# Patient Record
Sex: FEMALE | Race: WHITE | NOT HISPANIC OR LATINO | Employment: PART TIME | URBAN - METROPOLITAN AREA
[De-identification: names, ages, dates, MRNs, and addresses within clinical notes are randomized per-mention and may not be internally consistent; named-entity substitution may affect disease eponyms.]

---

## 2017-02-09 ENCOUNTER — OFFICE VISIT (OUTPATIENT)
Dept: FAMILY MEDICINE | Facility: CLINIC | Age: 52
End: 2017-02-09
Payer: COMMERCIAL

## 2017-02-09 ENCOUNTER — HOSPITAL ENCOUNTER (OUTPATIENT)
Dept: RADIOLOGY | Facility: HOSPITAL | Age: 52
Discharge: HOME | End: 2017-02-09
Attending: NURSE PRACTITIONER
Payer: COMMERCIAL

## 2017-02-09 VITALS
BODY MASS INDEX: 22.46 KG/M2 | RESPIRATION RATE: 18 BRPM | SYSTOLIC BLOOD PRESSURE: 104 MMHG | TEMPERATURE: 98 F | OXYGEN SATURATION: 98 % | HEIGHT: 66 IN | WEIGHT: 139.75 LBS | HEART RATE: 55 BPM | DIASTOLIC BLOOD PRESSURE: 72 MMHG

## 2017-02-09 DIAGNOSIS — S99.912A INJURY, ANKLE, LEFT, INITIAL ENCOUNTER: Primary | ICD-10-CM

## 2017-02-09 DIAGNOSIS — R20.2 TINGLING OF SKIN: ICD-10-CM

## 2017-02-09 DIAGNOSIS — S99.912A INJURY, ANKLE, LEFT, INITIAL ENCOUNTER: ICD-10-CM
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
## 2017-02-09 DIAGNOSIS — Z23 NEED FOR TDAP VACCINATION: ICD-10-CM

## 2017-02-09 PROCEDURE — 90715 TDAP VACCINE 7 YRS/> IM: CPT | Mod: S$GLB,,, | Performed by: NURSE PRACTITIONER

## 2017-02-09 PROCEDURE — 90471 IMMUNIZATION ADMIN: CPT | Mod: S$GLB,,, | Performed by: NURSE PRACTITIONER

## 2017-02-09 PROCEDURE — 73610 X-RAY EXAM OF ANKLE: CPT | Mod: TC,PO,LT

## 2017-02-09 PROCEDURE — 99213 OFFICE O/P EST LOW 20 MIN: CPT | Mod: 25,S$GLB,, | Performed by: NURSE PRACTITIONER

## 2017-02-09 PROCEDURE — 3078F DIAST BP <80 MM HG: CPT | Mod: S$GLB,,, | Performed by: NURSE PRACTITIONER

## 2017-02-09 PROCEDURE — 99999 PR PBB SHADOW E&M-EST. PATIENT-LVL IV: CPT | Mod: PBBFAC,,, | Performed by: NURSE PRACTITIONER

## 2017-02-09 PROCEDURE — 73610 X-RAY EXAM OF ANKLE: CPT | Mod: 26,LT,, | Performed by: RADIOLOGY

## 2017-02-09 PROCEDURE — 3074F SYST BP LT 130 MM HG: CPT | Mod: S$GLB,,, | Performed by: NURSE PRACTITIONER

## 2017-02-09 NOTE — MR AVS SNAPSHOT
[facility]  [address]  [place] [address]  Phone: [phone]  Fax: [phone]                  Viridiana Ross   2017 11:20 AM   Office Visit    Description:  Female : 1965   Provider:  Marion Batista NP   Department:  [facility]           Reason for Visit     Laceration           Diagnoses this Visit        Comments    Injury, ankle, left, initial encounter    -  Primary     Contact with sharp glass, initial encounter         Tingling of skin         Need for Tdap vaccination                To Do List           Future Appointments        Provider Department Dept Phone    2017 4:00 PM NAT Patino MD [facility] [phone]      Goals (5 Years of Data)     None      [facility]     [facility] On Call Nurse Care Line -  Assistance  Registered nurses in the [facility] provide clinical advisement, health education, appointment booking, and other advisory services.  Call for this free service at [phone].             Medications           Message regarding Medications     Verify the changes and/or additions to your medication regime listed below are the same as discussed with your clinician today.  If any of these changes or additions are incorrect, please notify your healthcare provider.             Verify that the below list of medications is an accurate representation of the medications you are currently taking.  If none reported, the list may be blank. If incorrect, please contact your healthcare provider. Carry this list with you in case of emergency.           Current Medications     ACANYA 1.2-2.5 % GlwP 1 g by Other route once daily. Apply 1 g to face daily    calcium carbonate (OS-BENITA) 600 mg (1,500 mg) Tab Take 600 mg by mouth once daily.    estradiol 0.05 mg/24 hr td ptsw (VIVELLE-DOT) 0.05 mg/24 hr Place 1 patch onto the skin twice a week.    hydrochlorothiazide (HYDRODIURIL) 25 MG tablet  "Take 25 mg by mouth once daily.    Saccharomyces boulardii (FLORASTOR) 250 mg capsule Take 250 mg by mouth once daily.    testosterone (ANDROGEL) 1 % (50 mg/5 gram) GlPk Apply 1 drop topically once daily.           Clinical Reference Information           Your Vitals Were     BP Pulse Temp Resp Height Weight    104/72 (BP Location: Right arm, Patient Position: Sitting, BP Method: Manual) 55 97.9 °F (36.6 °C) (Oral) 18 5' 6" (1.676 m) 63.4 kg (139 lb 12.4 oz)    SpO2 BMI             98% 22.56 kg/m2         Blood Pressure          Most Recent Value    BP  104/72      Allergies as of 2/9/2017     No Known Allergies      Immunizations Administered on Date of Encounter - 2/9/2017     Name Date Dose VIS Date Route    TDAP  Incomplete 0.5 mL 2/24/2015 Intramuscular      Orders Placed During Today's Visit      Normal Orders This Visit    Tdap Vaccine     Future Labs/Procedures Expected by Expires    X-Ray Ankle Complete Left  2/9/2017 2/10/2018      Language Assistance Services     ATTENTION: Language assistance services are available, free of charge. Please call [phone].      ATENCIÓN: Si habla español, tiene a marin disposición servicios gratuitos de asistencia lingüística. Llame al [phone].     NORAH Ý: N?u b?n nói Ti?ng Vi?t, có các d?ch v? h? tr? ngôn ng? mi?n phí dành cho b?n. G?i s? [phone].         [facility] complies with applicable Federal civil rights laws and does not discriminate on the basis of race, color, national origin, age, disability, or sex.        "

## 2017-02-09 NOTE — PROGRESS NOTES
Subjective:       Patient ID: Viridiana Ross is a 51 y.o. female.    Chief Complaint: Laceration (pt c/o cut on left ankle painful , states broke wine glass on friday and around the cut in numb and has shooting pain )  She was last seen in primary care by Dr. Patino on 12/05/2016. This is her first time seeing me in the clinic.  HPI   Cut on left lower leg from a bottle 6 days ago. Site is very painful to touch, even a sheet on it.  States glass broke onto leg when falling  Vitals:    02/09/17 1147   BP: 104/72   Pulse: (!) 55   Resp: 18   Temp: 97.9 °F (36.6 °C)     Review of Systems    Objective:      Physical Exam   Constitutional: She is oriented to person, place, and time. Vital signs are normal. She appears well-developed and well-nourished.   HENT:   Head: Normocephalic and atraumatic.   Cardiovascular: Normal rate, regular rhythm and normal heart sounds.    Pulmonary/Chest: Effort normal and breath sounds normal.   Musculoskeletal:        Feet:    Neurological: She is alert and oriented to person, place, and time.   Skin: Skin is warm, dry and intact.   Psychiatric: She has a normal mood and affect. Her speech is normal and behavior is normal. Judgment and thought content normal. Cognition and memory are normal.   Nursing note and vitals reviewed.          Assessment & Plan:       Injury, ankle, left, initial encounter  -     X-Ray Ankle Complete Left; Future; Expected date: 2/9/17  -     Tdap Vaccine    Contact with sharp glass, initial encounter  -     X-Ray Ankle Complete Left; Future; Expected date: 2/9/17  -     Tdap Vaccine    Tingling of skin  -     X-Ray Ankle Complete Left; Future; Expected date: 2/9/17    Need for Tdap vaccination  -     Tdap Vaccine          No Follow-up on file.

## 2017-05-22 ENCOUNTER — PATIENT OUTREACH (OUTPATIENT)
Dept: ADMINISTRATIVE | Facility: HOSPITAL | Age: 52
End: 2017-05-22

## 2017-06-06 ENCOUNTER — OFFICE VISIT (OUTPATIENT)
Dept: FAMILY MEDICINE | Facility: CLINIC | Age: 52
End: 2017-06-06
Payer: COMMERCIAL

## 2017-06-06 VITALS
HEART RATE: 56 BPM | BODY MASS INDEX: 22.29 KG/M2 | WEIGHT: 138.69 LBS | SYSTOLIC BLOOD PRESSURE: 108 MMHG | HEIGHT: 66 IN | DIASTOLIC BLOOD PRESSURE: 74 MMHG

## 2017-06-06 DIAGNOSIS — E78.5 HYPERLIPIDEMIA, UNSPECIFIED HYPERLIPIDEMIA TYPE: ICD-10-CM

## 2017-06-06 DIAGNOSIS — I10 ESSENTIAL HYPERTENSION: Primary | ICD-10-CM

## 2017-06-06 DIAGNOSIS — Z11.59 NEED FOR HEPATITIS C SCREENING TEST: ICD-10-CM

## 2017-06-06 PROCEDURE — 99214 OFFICE O/P EST MOD 30 MIN: CPT | Mod: S$GLB,,, | Performed by: FAMILY MEDICINE

## 2017-06-06 PROCEDURE — 99999 PR PBB SHADOW E&M-EST. PATIENT-LVL III: CPT | Mod: PBBFAC,,, | Performed by: FAMILY MEDICINE

## 2017-06-06 NOTE — PROGRESS NOTES
Subjective:       Patient ID: Viridiana Ross is a 52 y.o. female.    Chief Complaint: Follow-up Borderline High Cholesterol and Mammogram Done    Here for f/u htn and lipids.       Hypertension   This is a chronic problem. The current episode started more than 1 year ago. The problem is controlled. Pertinent negatives include no chest pain, palpitations or shortness of breath. Past treatments include diuretics.   Hyperlipidemia   This is a chronic problem. The current episode started more than 1 year ago. The problem is uncontrolled. Pertinent negatives include no chest pain or shortness of breath.     Review of Systems   Constitutional: Negative for chills, fatigue and fever.   Respiratory: Negative for cough, chest tightness and shortness of breath.    Cardiovascular: Negative for chest pain, palpitations and leg swelling.   Gastrointestinal: Negative for abdominal distention and abdominal pain.   Endocrine: Negative for cold intolerance and heat intolerance.   Skin: Negative for rash.   Psychiatric/Behavioral: The patient is not nervous/anxious.        Objective:      Physical Exam   Constitutional: She appears well-developed and well-nourished.   HENT:   Head: Normocephalic and atraumatic.   Cardiovascular: Normal rate, regular rhythm and normal heart sounds.    Pulmonary/Chest: Effort normal and breath sounds normal.   Psychiatric: She has a normal mood and affect.   Nursing note and vitals reviewed.      Assessment:       1. Essential hypertension    2. Hyperlipidemia, unspecified hyperlipidemia type    3. Need for hepatitis C screening test        Plan:       Essential hypertension  -     Comprehensive metabolic panel; Future; Expected date: 06/06/2017  -     Lipid panel; Future; Expected date: 06/06/2017    Hyperlipidemia, unspecified hyperlipidemia type  -     Comprehensive metabolic panel; Future; Expected date: 06/06/2017  -     Lipid panel; Future; Expected date: 06/06/2017    Need for hepatitis C  screening test  -     Hepatitis C antibody; Future; Expected date: 06/06/2017      Will monitor chronic medical issues and continue current plan of care.  Start statin if cholesterol up again after discussing with patient.  Bring cscope report or send via portal.  Return in about 3 months (around 9/6/2017), or if symptoms worsen or fail to improve.

## 2017-06-09 ENCOUNTER — LAB VISIT (OUTPATIENT)
Dept: LAB | Facility: HOSPITAL | Age: 52
End: 2017-06-09
Attending: FAMILY MEDICINE
Payer: COMMERCIAL

## 2017-06-09 DIAGNOSIS — E78.5 HYPERLIPIDEMIA, UNSPECIFIED HYPERLIPIDEMIA TYPE: ICD-10-CM

## 2017-06-09 DIAGNOSIS — I10 ESSENTIAL HYPERTENSION: ICD-10-CM

## 2017-06-09 DIAGNOSIS — Z11.59 NEED FOR HEPATITIS C SCREENING TEST: ICD-10-CM

## 2017-06-09 LAB
ALBUMIN SERPL BCP-MCNC: 3.8 G/DL
ALP SERPL-CCNC: 67 U/L
ALT SERPL W/O P-5'-P-CCNC: 19 U/L
ANION GAP SERPL CALC-SCNC: 7 MMOL/L
AST SERPL-CCNC: 20 U/L
BILIRUB SERPL-MCNC: 1.3 MG/DL
BUN SERPL-MCNC: 18 MG/DL
CALCIUM SERPL-MCNC: 9.6 MG/DL
CHLORIDE SERPL-SCNC: 106 MMOL/L
CHOLEST/HDLC SERPL: 3.1 {RATIO}
CO2 SERPL-SCNC: 27 MMOL/L
CREAT SERPL-MCNC: 1 MG/DL
EST. GFR  (AFRICAN AMERICAN): >60 ML/MIN/1.73 M^2
EST. GFR  (NON AFRICAN AMERICAN): >60 ML/MIN/1.73 M^2
GLUCOSE SERPL-MCNC: 80 MG/DL
HCV AB SERPL QL IA: NEGATIVE
HDL/CHOLESTEROL RATIO: 32.1 %
HDLC SERPL-MCNC: 234 MG/DL
HDLC SERPL-MCNC: 75 MG/DL
LDLC SERPL CALC-MCNC: 144.4 MG/DL
NONHDLC SERPL-MCNC: 159 MG/DL
POTASSIUM SERPL-SCNC: 4.1 MMOL/L
PROT SERPL-MCNC: 6.7 G/DL
SODIUM SERPL-SCNC: 140 MMOL/L
TRIGL SERPL-MCNC: 73 MG/DL

## 2017-06-09 PROCEDURE — 80061 LIPID PANEL: CPT

## 2017-06-09 PROCEDURE — 80053 COMPREHEN METABOLIC PANEL: CPT

## 2017-06-09 PROCEDURE — 36415 COLL VENOUS BLD VENIPUNCTURE: CPT | Mod: PO

## 2017-06-09 PROCEDURE — 86803 HEPATITIS C AB TEST: CPT

## 2017-06-28 DIAGNOSIS — E78.5 HYPERLIPIDEMIA, UNSPECIFIED HYPERLIPIDEMIA TYPE: Primary | ICD-10-CM

## 2017-06-28 RX ORDER — ATORVASTATIN CALCIUM 20 MG/1
20 TABLET, FILM COATED ORAL DAILY
Qty: 90 TABLET | Refills: 3 | Status: SHIPPED | OUTPATIENT
Start: 2017-06-28 | End: 2017-09-14

## 2017-06-30 ENCOUNTER — TELEPHONE (OUTPATIENT)
Dept: FAMILY MEDICINE | Facility: CLINIC | Age: 52
End: 2017-06-30

## 2017-06-30 NOTE — TELEPHONE ENCOUNTER
----- Message from Sue Jaime sent at 6/30/2017 10:19 AM CDT -----  Patient is returning nurse's call concerning lab results/please call patient back at [phone].

## 2017-07-06 NOTE — TELEPHONE ENCOUNTER
NAT Patino MD sent to [facility]             Sent lab result which is correct but also sending med in for high cholesterol      Left message asking patient call back regarding results and medication prescribed by PCP.

## 2017-07-06 NOTE — TELEPHONE ENCOUNTER
----- Message from NAT Patino MD sent at 6/28/2017  9:49 PM CDT -----  Sent lab result which is correct but also sending med in for high cholesterol  ----- Message -----  From: NAT Patino MD  Sent: 6/6/2017   4:56 PM  To: NAT Patino MD    Statin if cholesterol up

## 2017-07-12 ENCOUNTER — TELEPHONE (OUTPATIENT)
Dept: FAMILY MEDICINE | Facility: CLINIC | Age: 52
End: 2017-07-12

## 2017-07-12 NOTE — TELEPHONE ENCOUNTER
----- Message from Marybeth Falcon sent at 7/12/2017  3:52 PM CDT -----  Contact: Patient  Viridiana, patient [phone], Patient is returning the nurse's call. Call to POD. Please advise. thanks.

## 2017-09-08 ENCOUNTER — TELEPHONE (OUTPATIENT)
Dept: FAMILY MEDICINE | Facility: CLINIC | Age: 52
End: 2017-09-08

## 2017-09-08 NOTE — TELEPHONE ENCOUNTER
Spoke with patient and she stated that it was your intention to have labs rechecked to compare values. If so, which labs would you like to order. Also, patient would like labs scheduled at clinic in [place], [place].

## 2017-09-13 ENCOUNTER — TELEPHONE (OUTPATIENT)
Dept: FAMILY MEDICINE | Facility: CLINIC | Age: 52
End: 2017-09-13

## 2017-09-13 NOTE — TELEPHONE ENCOUNTER
----- Message from Becca Arellano sent at 9/12/2017  4:37 PM CDT -----  Contact: [phone]  Patient is requesting a call back from the nurse want to know if she should get labs done prior to her appt Thursday.  If labs are to be done she prefer to have them done in [place], and need to know today so she can start fasting.    Please call the patient upon request at phone number [phone].

## 2017-09-14 ENCOUNTER — OFFICE VISIT (OUTPATIENT)
Dept: FAMILY MEDICINE | Facility: CLINIC | Age: 52
End: 2017-09-14
Payer: COMMERCIAL

## 2017-09-14 VITALS
DIASTOLIC BLOOD PRESSURE: 84 MMHG | HEART RATE: 64 BPM | HEIGHT: 66 IN | WEIGHT: 139.31 LBS | SYSTOLIC BLOOD PRESSURE: 122 MMHG | RESPIRATION RATE: 16 BRPM | BODY MASS INDEX: 22.39 KG/M2

## 2017-09-14 DIAGNOSIS — I10 ESSENTIAL HYPERTENSION: Primary | ICD-10-CM

## 2017-09-14 DIAGNOSIS — M72.2 PLANTAR FASCIITIS: ICD-10-CM

## 2017-09-14 DIAGNOSIS — E78.5 HYPERLIPIDEMIA, UNSPECIFIED HYPERLIPIDEMIA TYPE: ICD-10-CM

## 2017-09-14 PROCEDURE — 99213 OFFICE O/P EST LOW 20 MIN: CPT | Mod: S$GLB,,, | Performed by: FAMILY MEDICINE

## 2017-09-14 PROCEDURE — 3074F SYST BP LT 130 MM HG: CPT | Mod: S$GLB,,, | Performed by: FAMILY MEDICINE

## 2017-09-14 PROCEDURE — 3008F BODY MASS INDEX DOCD: CPT | Mod: S$GLB,,, | Performed by: FAMILY MEDICINE

## 2017-09-14 PROCEDURE — 99999 PR PBB SHADOW E&M-EST. PATIENT-LVL III: CPT | Mod: PBBFAC,,, | Performed by: FAMILY MEDICINE

## 2017-09-14 PROCEDURE — 3079F DIAST BP 80-89 MM HG: CPT | Mod: S$GLB,,, | Performed by: FAMILY MEDICINE

## 2017-09-14 NOTE — PROGRESS NOTES
Subjective:       Patient ID: Viridiana Ross is a 52 y.o. female.    Chief Complaint: Hypertension (Patient here for 3 month follow up); Ankle Pain (Patient reports pain to right lower extrimity near ankle. ); and Wrist Pain (Patient reports pain to right wrist.)    Hypertension   This is a chronic problem. The current episode started more than 1 year ago. The problem is controlled. Pertinent negatives include no chest pain, headaches, neck pain or palpitations. Past treatments include diuretics.   Ankle Pain      Wrist Pain      Hyperlipidemia   This is a chronic problem. The current episode started more than 1 year ago. The problem is uncontrolled. Recent lipid tests were reviewed and are high. Pertinent negatives include no chest pain. Current antihyperlipidemic treatment includes diet change. The current treatment provides mild improvement of lipids.     Review of Systems   Constitutional: Negative for activity change and unexpected weight change.   HENT: Negative for hearing loss, rhinorrhea and trouble swallowing.    Eyes: Negative for discharge.   Respiratory: Negative for chest tightness and wheezing.    Cardiovascular: Negative for chest pain and palpitations.   Gastrointestinal: Negative for blood in stool, constipation, diarrhea and vomiting.   Endocrine: Negative for polydipsia and polyuria.   Genitourinary: Negative for difficulty urinating, dysuria, hematuria and menstrual problem.   Musculoskeletal: Positive for arthralgias. Negative for joint swelling and neck pain.   Neurological: Negative for weakness and headaches.   Psychiatric/Behavioral: Negative for confusion and dysphoric mood.       Objective:      Physical Exam   Constitutional: She appears well-developed and well-nourished.   HENT:   Head: Normocephalic and atraumatic.   Cardiovascular: Normal rate, regular rhythm and normal heart sounds.    Pulmonary/Chest: Effort normal and breath sounds normal.   Skin: She is not diaphoretic.    Psychiatric: She has a normal mood and affect.   Nursing note and vitals reviewed.      Assessment:       1. Essential hypertension    2. Hyperlipidemia, unspecified hyperlipidemia type    3. Plantar fasciitis        Plan:       Essential hypertension  -     Comprehensive metabolic panel; Future; Expected date: 09/14/2017  -     Lipid panel; Future; Expected date: 09/14/2017    Hyperlipidemia, unspecified hyperlipidemia type  -     Comprehensive metabolic panel; Future; Expected date: 09/14/2017  -     Lipid panel; Future; Expected date: 09/14/2017    Plantar fasciitis      Supportive shoes for feet and call if not improved.  Recheck lipids and if low 200s we discussed holding off statin.    Return in about 6 months (around 3/14/2018), or if symptoms worsen or fail to improve.

## 2017-09-15 ENCOUNTER — LAB VISIT (OUTPATIENT)
Dept: LAB | Facility: HOSPITAL | Age: 52
End: 2017-09-15
Attending: FAMILY MEDICINE
Payer: COMMERCIAL

## 2017-09-15 DIAGNOSIS — E78.5 HYPERLIPIDEMIA, UNSPECIFIED HYPERLIPIDEMIA TYPE: ICD-10-CM

## 2017-09-15 DIAGNOSIS — I10 ESSENTIAL HYPERTENSION: ICD-10-CM

## 2017-09-15 LAB
ALBUMIN SERPL BCP-MCNC: 3.3 G/DL
ALP SERPL-CCNC: 70 U/L
ALT SERPL W/O P-5'-P-CCNC: 17 U/L
ANION GAP SERPL CALC-SCNC: 7 MMOL/L
AST SERPL-CCNC: 20 U/L
BILIRUB SERPL-MCNC: 0.8 MG/DL
BUN SERPL-MCNC: 20 MG/DL
CALCIUM SERPL-MCNC: 9 MG/DL
CHLORIDE SERPL-SCNC: 107 MMOL/L
CHOLEST SERPL-MCNC: 212 MG/DL
CHOLEST/HDLC SERPL: 3.1 {RATIO}
CO2 SERPL-SCNC: 27 MMOL/L
CREAT SERPL-MCNC: 0.8 MG/DL
EST. GFR  (AFRICAN AMERICAN): >60 ML/MIN/1.73 M^2
EST. GFR  (NON AFRICAN AMERICAN): >60 ML/MIN/1.73 M^2
GLUCOSE SERPL-MCNC: 83 MG/DL
HDLC SERPL-MCNC: 68 MG/DL
HDLC SERPL: 32.1 %
LDLC SERPL CALC-MCNC: 135 MG/DL
NONHDLC SERPL-MCNC: 144 MG/DL
POTASSIUM SERPL-SCNC: 3.9 MMOL/L
PROT SERPL-MCNC: 6.4 G/DL
SODIUM SERPL-SCNC: 141 MMOL/L
TRIGL SERPL-MCNC: 45 MG/DL

## 2017-09-15 PROCEDURE — 80061 LIPID PANEL: CPT

## 2017-09-15 PROCEDURE — 80053 COMPREHEN METABOLIC PANEL: CPT

## 2017-09-15 PROCEDURE — 36415 COLL VENOUS BLD VENIPUNCTURE: CPT | Mod: PO

## 2018-04-06 NOTE — TELEPHONE ENCOUNTER
----- Message from Horace Munguia sent at 9/8/2017  8:38 AM CDT -----  Contact: patient  Patient called requesting order for labs before visit.please place order today. Thanks,   4

## 2018-05-15 ENCOUNTER — NURSE TRIAGE (OUTPATIENT)
Dept: ADMINISTRATIVE | Facility: CLINIC | Age: 53
End: 2018-05-15

## 2018-05-16 NOTE — TELEPHONE ENCOUNTER
"  Reason for Disposition   [1] MILD-MODERATE pain AND [2] constant AND [3] present > 2 hours    Answer Assessment - Initial Assessment Questions  1. LOCATION: "Where does it hurt?"       Abdominal pain  2. RADIATION: "Does the pain shoot anywhere else?" (e.g., chest, back)      Not reported  3. ONSET: "When did the pain begin?" (e.g., minutes, hours or days ago)       Last pm  4. SUDDEN: "Gradual or sudden onset?"      sudden  5. PATTERN "Does the pain come and go, or is it constant?"     - If constant: "Is it getting better, staying the same, or worsening?"       (Note: Constant means the pain never goes away completely; most serious pain is constant and it progresses)      - If intermittent: "How long does it last?" "Do you have pain now?"      (Note: Intermittent means the pain goes away completely between bouts)      Intermittent cramping  6. SEVERITY: "How bad is the pain?"  (e.g., Scale 1-10; mild, moderate, or severe)    - MILD (1-3): doesn't interfere with normal activities, abdomen soft and not tender to touch     - MODERATE (4-7): interferes with normal activities or awakens from sleep, tender to touch     - SEVERE (8-10): excruciating pain, doubled over, unable to do any normal activities       7/10 at it's worse- not feeling it now as it comes/goes  7. RECURRENT SYMPTOM: "Have you ever had this type of abdominal pain before?" If so, ask: "When was the last time?" and "What happened that time?"       Yes-hx of diverticulitis   8. CAUSE: "What do you think is causing the abdominal pain?"      As above  9. RELIEVING/AGGRAVATING FACTORS: "What makes it better or worse?" (e.g., movement, antacids, bowel movement)      n/a  10. OTHER SYMPTOMS: "Has there been any vomiting, diarrhea, constipation, or urine problems?"        Chills, temp 101 ,  Thin stools  11. PREGNANCY: "Is there any chance you are pregnant?" "When was your last menstrual period?"        n/a    Protocols used: ST ABDOMINAL PAIN - " FEMALE-A-AH

## 2018-06-23 ENCOUNTER — PATIENT MESSAGE (OUTPATIENT)
Dept: FAMILY MEDICINE | Facility: CLINIC | Age: 53
End: 2018-06-23

## 2018-11-01 ENCOUNTER — OFFICE VISIT (OUTPATIENT)
Dept: FAMILY MEDICINE | Facility: CLINIC | Age: 53
End: 2018-11-01
Payer: COMMERCIAL

## 2018-11-01 VITALS
HEART RATE: 60 BPM | OXYGEN SATURATION: 97 % | TEMPERATURE: 99 F | DIASTOLIC BLOOD PRESSURE: 60 MMHG | SYSTOLIC BLOOD PRESSURE: 110 MMHG | BODY MASS INDEX: 21.34 KG/M2 | WEIGHT: 136.25 LBS

## 2018-11-01 DIAGNOSIS — K57.92 DIVERTICULITIS: Primary | ICD-10-CM

## 2018-11-01 PROCEDURE — 99214 OFFICE O/P EST MOD 30 MIN: CPT | Mod: S$GLB,,, | Performed by: NURSE PRACTITIONER

## 2018-11-01 PROCEDURE — 99999 PR PBB SHADOW E&M-EST. PATIENT-LVL III: CPT | Mod: PBBFAC,,, | Performed by: NURSE PRACTITIONER

## 2018-11-01 PROCEDURE — 3074F SYST BP LT 130 MM HG: CPT | Mod: CPTII,S$GLB,, | Performed by: NURSE PRACTITIONER

## 2018-11-01 PROCEDURE — 3008F BODY MASS INDEX DOCD: CPT | Mod: CPTII,S$GLB,, | Performed by: NURSE PRACTITIONER

## 2018-11-01 PROCEDURE — 3078F DIAST BP <80 MM HG: CPT | Mod: CPTII,S$GLB,, | Performed by: NURSE PRACTITIONER

## 2018-11-01 RX ORDER — MOXIFLOXACIN HYDROCHLORIDE 400 MG/1
400 TABLET ORAL DAILY
Qty: 7 TABLET | Refills: 0 | Status: SHIPPED | OUTPATIENT
Start: 2018-11-01 | End: 2018-11-08

## 2018-11-01 NOTE — PROGRESS NOTES
Subjective:       Patient ID: Viridiana Ross is a 53 y.o. female.    Chief Complaint: Follow-up and Diverticulitis    Ms. Ross is a new patient to me. She presents today for abdominal pain    Abdominal Pain   This is a new problem. The current episode started in the past 7 days. The problem occurs daily. The problem has been unchanged. The pain is located in the LLQ. The quality of the pain is aching and cramping. The abdominal pain does not radiate. Associated symptoms include anorexia. Pertinent negatives include no fever, nausea or vomiting. Associated symptoms comments: Alternates constipation/diarrhea due to IBS. The pain is aggravated by eating and palpation. The pain is relieved by nothing. She has tried nothing for the symptoms.     moxifloxacin has worked for her in the past; does not tolerate flagyl well   Vitals:    11/01/18 0739   BP: 110/60   Pulse: 60   Temp: 98.7 °F (37.1 °C)     Review of Systems   Constitutional: Positive for chills. Negative for fever.   HENT: Negative for facial swelling and trouble swallowing.    Eyes: Negative for discharge and redness.   Respiratory: Negative for cough and shortness of breath.    Cardiovascular: Negative for chest pain and palpitations.   Gastrointestinal: Positive for abdominal pain and anorexia. Negative for nausea and vomiting.   Genitourinary: Negative for difficulty urinating.   Musculoskeletal: Negative for gait problem.   Skin: Negative for rash and wound.   Neurological: Negative for facial asymmetry and speech difficulty.   Psychiatric/Behavioral: Negative for confusion. The patient is not nervous/anxious.        No past medical history on file.  Objective:      Physical Exam   Constitutional: She is oriented to person, place, and time. She does not have a sickly appearance. No distress.   HENT:   Head: Normocephalic.   Right Ear: Hearing normal.   Left Ear: Hearing normal.   Nose: Nose normal.   Eyes: Conjunctivae and lids are normal.   Neck: No  JVD present. No tracheal deviation present.   Cardiovascular: Normal rate.   Pulmonary/Chest: Effort normal and breath sounds normal.   Abdominal: Normal appearance. She exhibits no distension. There is tenderness in the left lower quadrant.   Musculoskeletal: She exhibits no deformity.   Neurological: She is alert and oriented to person, place, and time.   Skin: She is not diaphoretic. No pallor.   Psychiatric: She has a normal mood and affect. Her speech is normal and behavior is normal. Judgment and thought content normal. Cognition and memory are normal.   Nursing note and vitals reviewed.      Assessment:       1. Diverticulitis        Plan:       Diverticulitis  -     moxifloxacin (AVELOX) 400 mg tablet; Take 1 tablet (400 mg total) by mouth once daily. for 7 days  Dispense: 7 tablet; Refill: 0    educated on diet during flare up, return precautions, safety         Follow-up for further evaluation if s/s worsen, fail to improve, or new symptoms arise.       Medication List           Accurate as of 11/1/18  7:55 AM. If you have any questions, ask your nurse or doctor.               CONTINUE taking these medications    ACANYA 1.2-2.5 % Glwp  Generic drug:  clindamycin-benzoyl peroxide     calcium carbonate 600 mg calcium (1,500 mg) Tab  Commonly known as:  OS-BENITA     estradiol 0.05 mg/24 hr td ptsw 0.05 mg/24 hr  Commonly known as:  VIVELLE-DOT     hydroCHLOROthiazide 25 MG tablet  Commonly known as:  HYDRODIURIL     moxifloxacin 400 mg tablet  Commonly known as:  AVELOX  Take 1 tablet (400 mg total) by mouth once daily. for 7 days     ondansetron 4 MG Tbdl  Commonly known as:  ZOFRAN-ODT  Take 1-2 tablets (4-8 mg total) by mouth every 8 (eight) hours as needed.     Saccharomyces boulardii 250 mg capsule  Commonly known as:  FLORASTOR     testosterone 1 % (50 mg/5 gram) Glpk  Commonly known as:  ANDROGEL           Where to Get Your Medications      These medications were sent to [id number] - [place]  [place] - [address] [address] [place] [address]    Phone:  [phone]   · moxifloxacin 400 mg tablet

## 2019-05-31 DIAGNOSIS — Z12.39 BREAST CANCER SCREENING: ICD-10-CM

## 2019-05-31 LAB
CHOLEST SERPL-MSCNC: 286 MG/DL (ref 0–200)
HDLC SERPL-MCNC: 89 MG/DL
LDLC SERPL CALC-MCNC: 181 MG/DL (ref 0–160)
TRIGL SERPL-MCNC: 82 MG/DL
VLDLC SERPL-MCNC: 16 MG/DL

## 2019-06-19 ENCOUNTER — TELEPHONE (OUTPATIENT)
Dept: ADMINISTRATIVE | Facility: HOSPITAL | Age: 54
End: 2019-06-19

## 2019-06-27 ENCOUNTER — OFFICE VISIT (OUTPATIENT)
Dept: FAMILY MEDICINE | Facility: CLINIC | Age: 54
End: 2019-06-27
Payer: COMMERCIAL

## 2019-06-27 VITALS
HEIGHT: 66 IN | SYSTOLIC BLOOD PRESSURE: 117 MMHG | TEMPERATURE: 98 F | BODY MASS INDEX: 21.89 KG/M2 | DIASTOLIC BLOOD PRESSURE: 82 MMHG | WEIGHT: 136.19 LBS | HEART RATE: 69 BPM

## 2019-06-27 DIAGNOSIS — Z00.01 ENCOUNTER FOR GENERAL ADULT MEDICAL EXAMINATION WITH ABNORMAL FINDINGS: Primary | ICD-10-CM

## 2019-06-27 DIAGNOSIS — E78.5 HYPERLIPIDEMIA, UNSPECIFIED HYPERLIPIDEMIA TYPE: ICD-10-CM

## 2019-06-27 DIAGNOSIS — Z79.899 ENCOUNTER FOR LONG-TERM (CURRENT) USE OF MEDICATIONS: ICD-10-CM

## 2019-06-27 DIAGNOSIS — I10 ESSENTIAL HYPERTENSION: ICD-10-CM

## 2019-06-27 DIAGNOSIS — K58.0 IRRITABLE BOWEL SYNDROME WITH DIARRHEA: ICD-10-CM

## 2019-06-27 DIAGNOSIS — E55.9 VITAMIN D DEFICIENCY: ICD-10-CM

## 2019-06-27 DIAGNOSIS — Z82.3 FAMILY HISTORY OF STROKE: ICD-10-CM

## 2019-06-27 PROCEDURE — 99999 PR PBB SHADOW E&M-EST. PATIENT-LVL III: CPT | Mod: PBBFAC,,, | Performed by: FAMILY MEDICINE

## 2019-06-27 PROCEDURE — 99396 PR PREVENTIVE VISIT,EST,40-64: ICD-10-PCS | Mod: S$GLB,,, | Performed by: FAMILY MEDICINE

## 2019-06-27 PROCEDURE — 99999 PR PBB SHADOW E&M-EST. PATIENT-LVL III: ICD-10-PCS | Mod: PBBFAC,,, | Performed by: FAMILY MEDICINE

## 2019-06-27 PROCEDURE — 3079F DIAST BP 80-89 MM HG: CPT | Mod: CPTII,S$GLB,, | Performed by: FAMILY MEDICINE

## 2019-06-27 PROCEDURE — 3074F PR MOST RECENT SYSTOLIC BLOOD PRESSURE < 130 MM HG: ICD-10-PCS | Mod: CPTII,S$GLB,, | Performed by: FAMILY MEDICINE

## 2019-06-27 PROCEDURE — 99396 PREV VISIT EST AGE 40-64: CPT | Mod: S$GLB,,, | Performed by: FAMILY MEDICINE

## 2019-06-27 PROCEDURE — 3079F PR MOST RECENT DIASTOLIC BLOOD PRESSURE 80-89 MM HG: ICD-10-PCS | Mod: CPTII,S$GLB,, | Performed by: FAMILY MEDICINE

## 2019-06-27 PROCEDURE — 3074F SYST BP LT 130 MM HG: CPT | Mod: CPTII,S$GLB,, | Performed by: FAMILY MEDICINE

## 2019-06-27 RX ORDER — DICYCLOMINE HYDROCHLORIDE 10 MG/1
CAPSULE ORAL DAILY PRN
COMMUNITY
Start: 2019-04-12

## 2019-06-27 NOTE — ASSESSMENT & PLAN NOTE
Patient is postmenopausal.  She takes estradiol.  Has been on testosterone replacement in the past.  Labs checked and were normal by gyn.  Patient reports she is feeling much better now as she is on keto diet.

## 2019-06-27 NOTE — PROGRESS NOTES
Subjective:      Patient ID: Viridiana Ross is a 54 y.o. female.    Chief Complaint: Establish Care (review labs from Gyn)    Problem List Items Addressed This Visit     Essential hypertension    Overview     Chronic.  Stable.  Well controlled.  Has been present for many years.  Taking hydrochlorothiazide.  Reports compliance.  No side effects reported.  Denies any chest pain shortness of breath blurred vision.  Patient does have a strong family history of heart attacks and strokes in the family.         Relevant Orders    Hypertension [employer] ([facility]) Enrollment Order (Completed)    Hypertension Digital Medicine ([facility]): Assign Onboarding Questionnaires (Completed)    Hyperlipidemia    Overview     Lab Results   Component Value Date    CHOL 212 (H) 09/15/2017    CHOL 234 (H) 06/09/2017    CHOL 275 (H) 12/09/2016     Lab Results   Component Value Date    HDL 68 09/15/2017    HDL 75 06/09/2017    HDL 76 (H) 12/09/2016     Lab Results   Component Value Date    LDLCALC 135.0 09/15/2017    LDLCALC 144.4 06/09/2017    LDLCALC 184.0 (H) 12/09/2016     Lab Results   Component Value Date    TRIG 45 09/15/2017    TRIG 73 06/09/2017    TRIG 75 12/09/2016     Lab Results   Component Value Date    CHOLHDL 32.1 09/15/2017    CHOLHDL 32.1 06/09/2017    CHOLHDL 27.6 12/09/2016     Chronic.  Untreated.  Only using diet and exercise.  The most recent lipid panel was done by her OBGYN on May 31, 2019.  Showing total cholesterol is still elevated 286.  Triglycerides are normal at 82 HDL is therapeutic at 89 LDL is still high at 181.  Patient has strong family history of heart attacks in her family.  Thyroid studies were normal.  Testosterone is within normal limits.  Blood count and metabolic panel within normal limits         Current Assessment & Plan     Patient is requesting NMR special lipid panel.  She has been doing a keto diet for the last 3 weeks which may have elevated her cholesterol.  Patient would like to know  further details of her cholesterol if it is causing inflammation specifically.  Will order special lipid panel with high sensitive CRP and inflammatory markers.  Will hold off on statin for now.  Recommend starting fish oil with high EPA.          Relevant Orders    Lipoprotein Analysis, by NMR    Vitamin D    Homocysteine, serum    High sensitivity CRP (Cardiac CRP)    Sedimentation rate, automated    Encounter for general adult medical examination with abnormal findings - Primary    Overview     Well Adult Physical: Patient here for a comprehensive physical exam.The patient reports no problems  Do you take any herbs or supplements that were not prescribed by a doctor?   Yes  Are you taking calcium supplements? no Are you taking aspirin daily? no   History:  Any STD's in the past? none           Current Assessment & Plan     Patient is postmenopausal.  She takes estradiol.  Has been on testosterone replacement in the past.  Labs checked and were normal by gyn.  Patient reports she is feeling much better now as she is on keto diet.         Relevant Orders    Hypertension [employer] [facility] ([facility]) Enrollment Order (Completed)    Hypertension Digital Medicine ([facility] Assign Onboarding Questionnaires (Completed)    Lipoprotein Analysis, by NMR    Hemoglobin A1c    Vitamin D    Homocysteine, serum    High sensitivity CRP (Cardiac CRP)    Sedimentation rate, automated    Encounter for long-term (current) use of medications    Overview     06/27/2019   Patient is on CHRONIC long-term drug therapy for managed conditions. See medication list. Reports compliance.  No side effects reported.  Routine lab work is being monitored.  Patient does not need refills today.     Lab Results   Component Value Date    WBC 14.74 (H) 05/15/2018    HGB 14.3 05/15/2018    HCT 42.0 05/15/2018    MCV 91 05/15/2018     05/15/2018      CMP  Sodium   Date Value Ref Range Status   05/15/2018 136 136 - 145 mmol/L Final     Potassium   Date  Value Ref Range Status   05/15/2018 3.5 3.5 - 5.1 mmol/L Final     Chloride   Date Value Ref Range Status   05/15/2018 99 95 - 110 mmol/L Final     CO2   Date Value Ref Range Status   05/15/2018 28 22 - 31 mmol/L Final     Glucose   Date Value Ref Range Status   05/15/2018 105 70 - 110 mg/dL Final     Comment:     The ADA recommends the following guidelines for fasting glucose:  Normal:       less than 100 mg/dL  Prediabetes:  100 mg/dL to 125 mg/dL  Diabetes:     126 mg/dL or higher       BUN, Bld   Date Value Ref Range Status   05/15/2018 17 7 - 18 mg/dL Final     Creatinine   Date Value Ref Range Status   05/15/2018 0.90 0.50 - 1.40 mg/dL Final     Calcium   Date Value Ref Range Status   05/15/2018 9.8 8.4 - 10.2 mg/dL Final     Total Protein   Date Value Ref Range Status   05/15/2018 7.4 6.0 - 8.4 g/dL Final     Albumin   Date Value Ref Range Status   05/15/2018 4.3 3.5 - 5.2 g/dL Final     Total Bilirubin   Date Value Ref Range Status   05/15/2018 1.3 0.2 - 1.3 mg/dL Final     Alkaline Phosphatase   Date Value Ref Range Status   05/15/2018 84 38 - 145 U/L Final     AST   Date Value Ref Range Status   05/15/2018 34 14 - 36 U/L Final     ALT   Date Value Ref Range Status   05/15/2018 37 10 - 44 U/L Final     Anion Gap   Date Value Ref Range Status   05/15/2018 9 8 - 16 mmol/L Final     eGFR if    Date Value Ref Range Status   05/15/2018 >60 >60 mL/min/1.73 m^2 Final     eGFR if non    Date Value Ref Range Status   05/15/2018 >60 >60 mL/min/1.73 m^2 Final     Comment:     Calculation used to obtain the estimated glomerular filtration  rate (eGFR) is the CKD-EPI equation.        Lab Results   Component Value Date    TSH 2.108 12/09/2016               Current Assessment & Plan     Complete history and physical was completed today.  Complete and thorough medication reconciliation was performed.  Discussed risks and benefits of medications.  Advised patient on orders and health  maintenance.  We discussed old records and old labs if available.  Will request any records not available through epic.  Continue current medications listed on your summary sheet.            Relevant Orders    Lipoprotein Analysis, by NMR    Hemoglobin A1c    Vitamin D    Homocysteine, serum    High sensitivity CRP (Cardiac CRP)    Sedimentation rate, automated    Irritable bowel syndrome with diarrhea    Overview     Chronic.  Fairly well controlled.  Patient recently started on KETO diet and her symptoms have improved.  Patient would like -inflammatories numbers measured.         Current Assessment & Plan     Checkinginflammatory numbers.           Relevant Orders    High sensitivity CRP (Cardiac CRP)    Sedimentation rate, automated    Vitamin D deficiency    Overview       2019  Vit d deficiency. Takes vitamin d supplement. No SE reported. Fatigue is slightly improved.          Current Assessment & Plan     Checking level continue supplementation          Relevant Orders    Vitamin D    Family history of stroke    Overview     A strong family history.  Patient would like to know more about her cholesterol prior to starting statin.         Current Assessment & Plan     Consider aspirin therapy.  Checking levels inflammatory markers.  Recheck special lipid panel         Relevant Orders    Lipoprotein Analysis, by NMR    Hemoglobin A1c    Homocysteine, serum    High sensitivity CRP (Cardiac CRP)    Sedimentation rate, automated          Past Medical History:  Past Medical History:   Diagnosis Date    Acne     Diverticulosis     Hypertension     IBS (irritable bowel syndrome)      Past Surgical History:   Procedure Laterality Date     SECTION      HYSTERECTOMY       Review of patient's allergies indicates:  No Known Allergies  Current Outpatient Medications on File Prior to Visit   Medication Sig Dispense Refill    ACANYA 1.2-2.5 % GlwP 1 g by Other route once daily. Apply 1 g to face daily       calcium carbonate (OS-BENITA) 600 mg (1,500 mg) Tab Take 600 mg by mouth once daily.      dicyclomine (BENTYL) 10 MG capsule       estradiol 0.05 mg/24 hr td ptsw (VIVELLE-DOT) 0.05 mg/24 hr Place 1 patch onto the skin twice a week.      hydrochlorothiazide (HYDRODIURIL) 25 MG tablet Take 25 mg by mouth once daily.      Saccharomyces boulardii (FLORASTOR) 250 mg capsule Take 250 mg by mouth once daily.      [DISCONTINUED] ondansetron (ZOFRAN-ODT) 4 MG TbDL Take 1-2 tablets (4-8 mg total) by mouth every 8 (eight) hours as needed. 15 tablet 0    [DISCONTINUED] testosterone (ANDROGEL) 1 % (50 mg/5 gram) GlPk Apply 1 drop topically once daily.       No current facility-administered medications on file prior to visit.      Social History     Socioeconomic History    Marital status:      Spouse name: Not on file    Number of children: 3    Years of education: Not on file    Highest education level: Not on file   Occupational History    Not on file   Social Needs    Financial resource strain: Not hard at all    Food insecurity:     Worry: Never true     Inability: Never true    Transportation needs:     Medical: No     Non-medical: No   Tobacco Use    Smoking status: Never Smoker    Smokeless tobacco: Never Used   Substance and Sexual Activity    Alcohol use: Yes     Frequency: Never     Comment: socially    Drug use: Never    Sexual activity: Yes     Partners: Male     Birth control/protection: See Surgical Hx   Lifestyle    Physical activity:     Days per week: 5 days     Minutes per session: 60 min    Stress: Only a little   Relationships    Social connections:     Talks on phone: More than three times a week     Gets together: Three times a week     Attends Sikhism service: 1 to 4 times per year     Active member of club or organization: No     Attends meetings of clubs or organizations: Never     Relationship status:    Other Topics Concern    Not on file   Social History  "Narrative    Not on file     Family History   Problem Relation Age of Onset    Hyperlipidemia Mother     Hypertension Mother     Diabetes Mother     Cancer Mother     Diverticulitis Mother     Alzheimer's disease Mother     Hypertension Father     Hyperlipidemia Father     Stroke Father     Diverticulitis Sister     Cancer Brother     Cancer Brother      I have reviewed the complete PMH, social history, surgical history, allergies and medications.  As well as family history.    Review of Systems   Constitutional: Negative for activity change and unexpected weight change.   HENT: Negative for hearing loss, rhinorrhea and trouble swallowing.    Eyes: Negative for discharge and visual disturbance.   Respiratory: Negative for chest tightness and wheezing.    Cardiovascular: Negative for chest pain and palpitations.   Gastrointestinal: Negative for blood in stool, constipation, diarrhea and vomiting.   Endocrine: Negative for polydipsia and polyuria.   Genitourinary: Negative for difficulty urinating, dysuria, hematuria and menstrual problem.   Musculoskeletal: Negative for arthralgias, joint swelling and neck pain.   Neurological: Negative for weakness and headaches.   Psychiatric/Behavioral: Negative for confusion and dysphoric mood.       Objective:     /82   Pulse 69   Temp 98.4 °F (36.9 °C) (Oral)   Ht 5' 6" (1.676 m)   Wt 61.8 kg (136 lb 3.2 oz)   BMI 21.98 kg/m²     Physical Exam   Constitutional: She is oriented to person, place, and time. She appears well-developed and well-nourished. No distress.   HENT:   Head: Normocephalic and atraumatic.   Eyes: Pupils are equal, round, and reactive to light. EOM are normal.   Neck: Normal range of motion. Neck supple.   Cardiovascular: Normal rate, regular rhythm, normal heart sounds and intact distal pulses.   No murmur heard.  Pulmonary/Chest: Effort normal and breath sounds normal. No respiratory distress. She has no wheezes.   Musculoskeletal: " Normal range of motion. She exhibits no edema.   Neurological: She is alert and oriented to person, place, and time. No cranial nerve deficit.   Skin: Skin is warm and dry. Capillary refill takes less than 2 seconds.   Psychiatric: She has a normal mood and affect. Her behavior is normal.   Nursing note and vitals reviewed.    Assessment:     1. Encounter for general adult medical examination with abnormal findings    2. Encounter for long-term (current) use of medications    3. Hyperlipidemia, unspecified hyperlipidemia type    4. Essential hypertension    5. Irritable bowel syndrome with diarrhea    6. Vitamin D deficiency    7. Family history of stroke        Plan:     I have Reviewed and summarized old records.  I have performed thorough medication reconciliation today and discussed risk and benefits of each medication.  I have reviewed labs and discussed with patient.  All questions were answered.  I am requesting old records and will review them once they are available.    Problem List Items Addressed This Visit     Essential hypertension    Relevant Orders    Hypertension [employer] [facility] ([facility]) Enrollment Order (Completed)    Hypertension Digital Medicine ([facility]): Assign Onboarding Questionnaires (Completed)    Hyperlipidemia     Patient is requesting [facility] special lipid panel.  She has been doing a keto diet for the last 3 weeks which may have elevated her cholesterol.  Patient would like to know further details of her cholesterol if it is causing inflammation specifically.  Will order special lipid panel with high sensitive CRP and inflammatory markers.  Will hold off on statin for now.  Recommend starting fish oil with high EPA.          Relevant Orders    Lipoprotein Analysis, by NMR    Vitamin D    Homocysteine, serum    High sensitivity CRP (Cardiac CRP)    Sedimentation rate, automated    Encounter for general adult medical examination with abnormal findings - Primary     Patient is postmenopausal.  She  takes estradiol.  Has been on testosterone replacement in the past.  Labs checked and were normal by gyn.  Patient reports she is feeling much better now as she is on keto diet.         Relevant Orders    Hypertension Digital Medicine ([facility]) Enrollment Order (Completed)    Hypertension Digital Medicine ([facility]): Assign Onboarding Questionnaires (Completed)    Lipoprotein Analysis, by NMR    Hemoglobin A1c    Vitamin D    Homocysteine, serum    High sensitivity CRP (Cardiac CRP)    Sedimentation rate, automated    Encounter for long-term (current) use of medications     Complete history and physical was completed today.  Complete and thorough medication reconciliation was performed.  Discussed risks and benefits of medications.  Advised patient on orders and health maintenance.  We discussed old records and old labs if available.  Will request any records not available through epic.  Continue current medications listed on your summary sheet.            Relevant Orders    Lipoprotein Analysis, by NMR    Hemoglobin A1c    Vitamin D    Homocysteine, serum    High sensitivity CRP (Cardiac CRP)    Sedimentation rate, automated    Irritable bowel syndrome with diarrhea     Checkinginflammatory numbers.           Relevant Orders    High sensitivity CRP (Cardiac CRP)    Sedimentation rate, automated    Vitamin D deficiency     Checking level continue supplementation          Relevant Orders    Vitamin D    Family history of stroke     Consider aspirin therapy.  Checking levels inflammatory markers.  Recheck special lipid panel         Relevant Orders    Lipoprotein Analysis, by NMR    Hemoglobin A1c    Homocysteine, serum    High sensitivity CRP (Cardiac CRP)    Sedimentation rate, automated        Follow up in about 1 month (around 7/25/2019) for lab results.  This note was compiled by using a dictation system and therefore please be aware that typographical errors can and do occur.  Please contact me if you see any errors  specifically.    Ranjeet Coleman MD  We Offer Telehealth & Same Day Appointments!   Book your Telehealth appointment with me through my nurse or   Clinic appointments on [employer] by [employer]!  [phone]          Check out my Facebook Page and Follow Me at: CLICK HERE    Check out my website at [employer] by clicking on: CLICK HERE    To Schedule appointments online, go to [employer]: CLICK HERE

## 2019-06-27 NOTE — PATIENT INSTRUCTIONS
Understanding Food and Cholesterol  Having a high cholesterol level puts you at risk for heart disease and other health problems. What you eat has a big effect on your bodys cholesterol level. Eating certain foods can raise your cholesterol. Other foods can help you lower it. Watching what you eat can help you get your cholesterol level under control.  Know which foods are high in saturated fat and trans fat  Foods high in saturated fat and trans fat can raise your LDL (bad) cholesterol. Its important to knowing which foods are high in these fats, and eat less of them. This can help you manage your cholesterol levels.  Foods high in these fats are:  · Animal products, including beef, lamb, pork, and poultry with skin on  · Cold cuts, castillo, sausage  · Creamy sauces and fatty gravies  · Cookies, donuts, muffins, and pastries  · Fried foods  · Shortening, butter, coconut oil, palm oil, cocoa butter, partially hydrogenated oils (read labels)  · High-fat dairy products such as whole milk, cheese, cream cheese, and ice cream  Better choices:  · Lean beef, skinless white-meat poultry, fish  · Tomato sauce, vegetable puree  · Dried fruit, bagels, bread with jam  · Baked, broiled, steamed, or roasted foods  · Soft (tub) margarine, canola oil, and olive oil in moderate amounts  · Low-fat or nonfat dairy products, such as 1% or fat-free milk, and reduced-fat cheese  Use fiber to help control cholesterol  Foods high in fiber can help you keep your cholesterol down. Good sources of fiber are:  · Oats  · Barley  · Whole grains  · Beans  · Vegetables  · Cornmeal  · Popcorn  · Berries, apples, other fruits  Date Last Reviewed: 8/10/2015  © 7447-4095 [employer]. [address] [place] [address], [place] [address]. All rights reserved. This information is not intended as a substitute for professional medical care. Always follow your healthcare professional's instructions.

## 2019-06-27 NOTE — ASSESSMENT & PLAN NOTE
Complete history and physical was completed today.  Complete and thorough medication reconciliation was performed.  Discussed risks and benefits of medications.  Advised patient on orders and health maintenance.  We discussed old records and old labs if available.  Will request any records not available through epic.  Continue current medications listed on your summary sheet.

## 2019-06-27 NOTE — ASSESSMENT & PLAN NOTE
Patient is requesting NMR special lipid panel.  She has been doing a keto diet for the last 3 weeks which may have elevated her cholesterol.  Patient would like to know further details of her cholesterol if it is causing inflammation specifically.  Will order special lipid panel with high sensitive CRP and inflammatory markers.  Will hold off on statin for now.  Recommend starting fish oil with high EPA.

## 2019-06-28 ENCOUNTER — TELEPHONE (OUTPATIENT)
Dept: ADMINISTRATIVE | Facility: HOSPITAL | Age: 54
End: 2019-06-28

## 2019-07-03 ENCOUNTER — PATIENT OUTREACH (OUTPATIENT)
Dept: ADMINISTRATIVE | Facility: HOSPITAL | Age: 54
End: 2019-07-03

## 2019-07-04 ENCOUNTER — PATIENT MESSAGE (OUTPATIENT)
Dept: ADMINISTRATIVE | Facility: OTHER | Age: 54
End: 2019-07-04

## 2019-07-12 ENCOUNTER — PATIENT MESSAGE (OUTPATIENT)
Dept: ADMINISTRATIVE | Facility: OTHER | Age: 54
End: 2019-07-12

## 2019-07-23 ENCOUNTER — PATIENT MESSAGE (OUTPATIENT)
Dept: FAMILY MEDICINE | Facility: CLINIC | Age: 54
End: 2019-07-23

## 2019-07-23 DIAGNOSIS — Z00.01 ENCOUNTER FOR GENERAL ADULT MEDICAL EXAMINATION WITH ABNORMAL FINDINGS: Primary | ICD-10-CM

## 2019-07-26 NOTE — TELEPHONE ENCOUNTER
Let The patient know that I have scheduled a urinalysis with her lab work at [facility].

## 2019-07-29 ENCOUNTER — PATIENT OUTREACH (OUTPATIENT)
Dept: OTHER | Facility: OTHER | Age: 54
End: 2019-07-29

## 2019-07-29 NOTE — PROGRESS NOTES
Patient's results were released through my chart and was notified.  Please follow up to make sure that they received the results.  Released results through my chart.    I have reviewed your recent blood work.  So for you of perfect lab work showing low inflammation in your body and adequate levels of vitamin-D.    Your high sensitivity CRP was normal.  Your homocystine serum level was normal.  Your sedimentation rate ESR was normal.  Your cholesterol is pending.    Your vitamin-D is normal .  Your hemoglobin A1c is normal.   We will discuss these results in detail at your next office visit.  Please let me know if you have any questions concerning these laboratory results.

## 2019-07-29 NOTE — LETTER
August 8, 2019     Viridiana Ross  [address] [address]       Dear Viridiana,    Welcome to [facility]! Our goal is to make care effective, proactive and convenient by using data you send us from home to better treat your chronic conditions.          My name is Tonia Barney, and I am your dedicated Digital Medicine clinician. As an expert in medication management, I will help ensure that the medications you are taking continue to provide the intended benefits and help you reach your goals. You can reach me directly at [phone] or by sending me a message directly through your [facility] account.      I am Lisa Minaya and I will be your health . My job is to help you identify lifestyle changes to improve your disease control. We will talk about nutrition, exercise, and other ways you may be able to adjust your current habits to better your health. Additionally, we will help ensure you are completing the tests and screenings that are necessary to help manage your conditions. You can reach me directly at [phone] or by sending me a message directly through your [facility] account.    Most importantly, YOU are at the center of this team. Together, we will work to improve your overall health and encourage you to meet your goals for a healthier lifestyle.     What we expect from YOU:  · Please take frequent home blood pressure measurements. We ask that you take at least 1 blood pressure reading per week, but more information will better help us get you know you. Be sure you rest for a few minutes before taking the reading in a quiet, comfortable place.     Be available to receive phone calls or [facility] messages, when appropriate, from your care team. Please let us know if there are any specific days or times that work best for us to reach you via phone.     Complete routine tests and screenings. Dont worry, we will help keep you on track!           What  you should expect from your Digital Medicine Care Team:   We will work with you to create a personalized plan of care and provide you with encouragement and education, including regarding lifestyle changes, that could help you manage your disease states.     We will adjust your current medications, if needed, and continue to monitor your long-term progress.     We will provide you and your physician with monthly progress reports after you have been in the program for more than 30 days.     We will send you reminders through [facility] and text messages to help ensure you do not miss any testing deadlines to help manage your disease states.    You will be able to reach us by phone or through your [facility] account by clicking our names under Care Team on the right side of the home screen.    I look forward to working with you to achieve your blood pressure goals!    We look forward to working with you to help manage your health,    Sincerely,    Your Digital Medicine Team    Please visit our websites to learn more:   · Hypertension: [email or web address]      Remember, we are not available for emergencies. If you have an emergency, please contact your doctors office directly or call [facility] on-call ([phone] or [phone]) or [phone].

## 2019-07-29 NOTE — PROGRESS NOTES
In attempt to complete enrollment call pt request to return the call in 30 minutes to complete enrollment.      Last 5 Patient Entered Readings                                      Current 30 Day Average: 102/78     Recent Readings 7/28/2019 7/26/2019 7/26/2019 7/26/2019    SBP (mmHg) 100 103 106 113    DBP (mmHg) 74 78 83 76    Pulse 60 79 70 74

## 2019-08-01 NOTE — PROGRESS NOTES
2nd attempt to complete enrollment call. No answer, left voicemail. Sent portal message.      Last 5 Patient Entered Readings                                      Current 30 Day Average: 103/76     Recent Readings 7/31/2019 7/29/2019 7/28/2019 7/26/2019 7/26/2019    SBP (mmHg) 104 103 100 103 106    DBP (mmHg) 72 74 74 78 83    Pulse 82 72 60 79 70

## 2019-08-08 ENCOUNTER — PATIENT MESSAGE (OUTPATIENT)
Dept: FAMILY MEDICINE | Facility: CLINIC | Age: 54
End: 2019-08-08

## 2019-08-08 ENCOUNTER — TELEPHONE (OUTPATIENT)
Dept: FAMILY MEDICINE | Facility: CLINIC | Age: 54
End: 2019-08-08

## 2019-08-08 DIAGNOSIS — Z00.01 ENCOUNTER FOR GENERAL ADULT MEDICAL EXAMINATION WITH ABNORMAL FINDINGS: Primary | ICD-10-CM

## 2019-08-08 DIAGNOSIS — Z79.899 ENCOUNTER FOR LONG-TERM (CURRENT) USE OF MEDICATIONS: ICD-10-CM

## 2019-08-08 NOTE — PROGRESS NOTES
Digital Medicine Enrollment Call    Introduced Mrs. Viridiana Ross to Digital Medicine.     Discussed program expectations and requirements.    Introduced digital medicine care team.     Reviewed the importance of self-monitoring for digital medicine participation.     Reviewed that the Digital Medicine team is not available for emergencies and instructed the patient to call 911 or [facility] ([phone] or [phone]) if one arises.          Last 5 Patient Entered Readings                                      Current 30 Day Average: 106/77     Recent Readings 8/3/2019 8/3/2019 8/1/2019 7/31/2019 7/29/2019    SBP (mmHg) 106 122 117 104 103    DBP (mmHg) 73 83 84 72 74    Pulse 88 94 83 82 72

## 2019-08-08 NOTE — TELEPHONE ENCOUNTER
We sent patient's orders to [facility], but the urine order was never received, patient will come into office tomorrow to give specimen, can you please sign the attached order so I can schedule her for this test?    Thanks

## 2019-08-09 ENCOUNTER — LAB VISIT (OUTPATIENT)
Dept: LAB | Facility: HOSPITAL | Age: 54
End: 2019-08-09
Attending: FAMILY MEDICINE
Payer: COMMERCIAL

## 2019-08-09 DIAGNOSIS — Z79.899 ENCOUNTER FOR LONG-TERM (CURRENT) USE OF MEDICATIONS: ICD-10-CM

## 2019-08-09 DIAGNOSIS — Z00.01 ENCOUNTER FOR GENERAL ADULT MEDICAL EXAMINATION WITH ABNORMAL FINDINGS: ICD-10-CM

## 2019-08-09 LAB
BACTERIA #/AREA URNS HPF: ABNORMAL /HPF
BILIRUB UR QL STRIP: NEGATIVE
CLARITY UR: CLEAR
COLOR UR: YELLOW
GLUCOSE UR QL STRIP: NEGATIVE
HGB UR QL STRIP: ABNORMAL
HYALINE CASTS #/AREA URNS LPF: 2 /LPF
KETONES UR QL STRIP: ABNORMAL
LEUKOCYTE ESTERASE UR QL STRIP: NEGATIVE
MICROSCOPIC COMMENT: ABNORMAL
NITRITE UR QL STRIP: NEGATIVE
PH UR STRIP: 6 [PH] (ref 5–8)
PROT UR QL STRIP: NEGATIVE
RBC #/AREA URNS HPF: 2 /HPF (ref 0–4)
SP GR UR STRIP: 1.02 (ref 1–1.03)
SQUAMOUS #/AREA URNS HPF: 50 /HPF
URN SPEC COLLECT METH UR: ABNORMAL
WBC #/AREA URNS HPF: 1 /HPF (ref 0–5)

## 2019-08-09 PROCEDURE — 81000 URINALYSIS NONAUTO W/SCOPE: CPT | Mod: PO

## 2019-08-09 NOTE — PROGRESS NOTES
Please call to make sure patient get the results.    Your urinalysis is abnormal.  Showing +2 ketones which is explained by your keto diet.  However you do have +2 blood with red blood cells and hyaline cast.  I would recommend repeating urinalysis however you did have similar findings 1 year ago.  This is likely a benign finding due to dehydration exercise etc.  However if not clearing will need to follow up with a kidney specialist.

## 2019-08-12 ENCOUNTER — PATIENT OUTREACH (OUTPATIENT)
Dept: OTHER | Facility: OTHER | Age: 54
End: 2019-08-12

## 2019-08-12 NOTE — PROGRESS NOTES
HPI:  Ms. Kemp is a 54 year old female with a PMH that includes:  Past Medical History:   Diagnosis Date    Acne     Diverticulosis     Hypertension     IBS (irritable bowel syndrome)        Called patient to welcome into HTN DMP. Patient endorses adherence to medication regimen. Patient denies hypotensive s/sx (lightheadedness, dizziness, nausea, fatigue); patient denies hypertensive s/sx (SOB, CP, severe headaches, changes in vision). Instructed patient to seek medical care if BP > 180/110 and is accompanied by hypertensive s/sx associated, patient confirms understanding.     Last 5 Patient Entered Readings                                      Current 30 Day Average: 105/77     Recent Readings 8/10/2019 8/10/2019 8/9/2019 8/3/2019 8/3/2019    SBP (mmHg) 98 104 111 106 122    DBP (mmHg) 69 76 81 73 83    Pulse 81 70 79 88 94        Assessment:  Reviewed recent readings. Per 2017 ACC/ AHA HTN guidelines (goal of BP < 130/80), current 30-day average is well controlled.     Plan:  Continue current medication regimen.   I will continue to monitor regularly and will follow-up in ~5 weeks, sooner if blood pressure begins to trend upward or downward.     Current medication regimen:  Hypertension Medications             hydrochlorothiazide (HYDRODIURIL) 25 MG tablet Take 25 mg by mouth once daily.          Patient denies having questions or concerns. Patient has my contact information and knows to call with any concerns or clinical changes.

## 2019-08-14 ENCOUNTER — PATIENT OUTREACH (OUTPATIENT)
Dept: OTHER | Facility: OTHER | Age: 54
End: 2019-08-14

## 2019-08-14 NOTE — PROGRESS NOTES
"Last 5 Patient Entered Readings                                      Current 30 Day Average: 105/76     Recent Readings 8/13/2019 8/12/2019 8/10/2019 8/10/2019 8/9/2019    SBP (mmHg) 109 99 98 104 111    DBP (mmHg) 71 67 69 76 81    Pulse 60 84 81 70 79        Digital Medicine: Health  Introduction    Introduced Mrs. Viridiana Ross to Digital Medicine. Discussed health  role and recommended lifestyle modifications.    Lifestyle Assessment:  Current Dietary Habits(i.e. low sodium, food labels, dining out): Ms. Kemp has been on keto diet but recently got high cholesterol labs back. Patient is now trying to stop keto but having a hard time because she has IBS. Patient states she has never felt better since cutting out the carbs but "needs help with healthy carbs." Encouraged patient to substitute fats for protein and increase fruits/vegetble intake. Patient also requested a meal plan and guidelines. Will e-mail patient resources and follow up in a few weeks for more specific changes.     Exercise: Patient is very active and rides her bike 25 miles per day to train for a bike marathon. Patient also "walks a lot." Encouraged exercise routine along with hydration.    Alcohol/Tobacco: Deferred    Medication Adherence: has been compliant with the medicaiton regimen. Ms. Kemp does not have any questions concerning her BP medications or readings and is feeling okay at this time.     Reviewed AHA/AACE recommendations:  Limit sodium intake to <2000mg/day  Recommended CHO intake, 45-65% of daily caloric intake  Perform 150 minutes of physical activity per week    Reviewed the importance of self-monitoring, medication adherence, and that the health  can be used as a resource for lifestyle modifications to help reduce or maintain a healthy lifestyle.  Reviewed that the Digital Medicine team is not available for emergencies and instructed the patient to call 911 or [facility] ([phone] or " [phone]) if one arises.

## 2019-09-02 LAB
CHOLEST SERPL-MCNC: 367 MG/DL (ref 100–199)
HDL SERPL-SCNC: 44 UMOL/L
HDLC SERPL-MCNC: 94 MG/DL
LDL SERPL QN: 22.1 NM
LDL SERPL-SCNC: 2543 NMOL/L
LDL SMALL SERPL-SCNC: 220 NMOL/L
LDLC SERPL CALC-MCNC: 260 MG/DL (ref 0–99)
LP-IR SCORE SERPL: <25
TRIGL SERPL-MCNC: 64 MG/DL (ref 0–149)

## 2019-09-03 LAB
25(OH)D3+25(OH)D2 SERPL-MCNC: 47.9 NG/ML (ref 30–100)
CRP SERPL HS-MCNC: 2.42 MG/L (ref 0–3)
ERYTHROCYTE [SEDIMENTATION RATE] IN BLOOD BY WESTERGREN METHOD: 7 MM/HR (ref 0–40)
HBA1C MFR BLD: 5.1 % (ref 4.8–5.6)
HCYS SERPL-SCNC: 10.8 UMOL/L (ref 0–15)

## 2019-09-10 ENCOUNTER — OFFICE VISIT (OUTPATIENT)
Dept: FAMILY MEDICINE | Facility: CLINIC | Age: 54
End: 2019-09-10
Payer: COMMERCIAL

## 2019-09-10 VITALS
WEIGHT: 131 LBS | SYSTOLIC BLOOD PRESSURE: 108 MMHG | HEIGHT: 66 IN | HEART RATE: 56 BPM | BODY MASS INDEX: 21.05 KG/M2 | DIASTOLIC BLOOD PRESSURE: 77 MMHG | TEMPERATURE: 98 F

## 2019-09-10 DIAGNOSIS — Z82.49 FH: HEART DISEASE: ICD-10-CM

## 2019-09-10 DIAGNOSIS — E78.01 FAMILIAL HYPERCHOLESTEROLEMIA: Primary | ICD-10-CM

## 2019-09-10 DIAGNOSIS — Z79.899 ENCOUNTER FOR LONG-TERM (CURRENT) USE OF MEDICATIONS: ICD-10-CM

## 2019-09-10 DIAGNOSIS — R31.21 ASYMPTOMATIC MICROSCOPIC HEMATURIA: ICD-10-CM

## 2019-09-10 DIAGNOSIS — E55.9 VITAMIN D DEFICIENCY: ICD-10-CM

## 2019-09-10 DIAGNOSIS — I10 ESSENTIAL HYPERTENSION: ICD-10-CM

## 2019-09-10 PROBLEM — R53.81 MALAISE AND FATIGUE: Status: ACTIVE | Noted: 2019-09-10

## 2019-09-10 PROBLEM — R53.83 MALAISE AND FATIGUE: Status: ACTIVE | Noted: 2019-09-10

## 2019-09-10 PROBLEM — N95.1 SYMPTOMATIC MENOPAUSAL OR FEMALE CLIMACTERIC STATES: Status: ACTIVE | Noted: 2019-09-10

## 2019-09-10 PROBLEM — G47.00 INSOMNIA: Status: ACTIVE | Noted: 2019-09-10

## 2019-09-10 LAB
BACTERIA #/AREA URNS HPF: ABNORMAL /HPF
BILIRUB UR QL STRIP: NEGATIVE
CLARITY UR: CLEAR
COLOR UR: ABNORMAL
GLUCOSE UR QL STRIP: NEGATIVE
HGB UR QL STRIP: ABNORMAL
KETONES UR QL STRIP: NEGATIVE
LEUKOCYTE ESTERASE UR QL STRIP: NEGATIVE
MICROSCOPIC COMMENT: ABNORMAL
NITRITE UR QL STRIP: NEGATIVE
PH UR STRIP: 6 [PH] (ref 5–8)
PROT UR QL STRIP: NEGATIVE
RBC #/AREA URNS HPF: 8 /HPF (ref 0–4)
SP GR UR STRIP: 1.01 (ref 1–1.03)
SQUAMOUS #/AREA URNS HPF: 7 /HPF
URN SPEC COLLECT METH UR: ABNORMAL
WBC #/AREA URNS HPF: 1 /HPF (ref 0–5)

## 2019-09-10 PROCEDURE — 3074F SYST BP LT 130 MM HG: CPT | Mod: CPTII,S$GLB,, | Performed by: FAMILY MEDICINE

## 2019-09-10 PROCEDURE — 81002 URINALYSIS NONAUTO W/O SCOPE: CPT | Mod: PO

## 2019-09-10 PROCEDURE — 99999 PR PBB SHADOW E&M-EST. PATIENT-LVL IV: CPT | Mod: PBBFAC,,, | Performed by: FAMILY MEDICINE

## 2019-09-10 PROCEDURE — 99214 OFFICE O/P EST MOD 30 MIN: CPT | Mod: S$GLB,,, | Performed by: FAMILY MEDICINE

## 2019-09-10 PROCEDURE — 3008F PR BODY MASS INDEX (BMI) DOCUMENTED: ICD-10-PCS | Mod: CPTII,S$GLB,, | Performed by: FAMILY MEDICINE

## 2019-09-10 PROCEDURE — 3078F PR MOST RECENT DIASTOLIC BLOOD PRESSURE < 80 MM HG: ICD-10-PCS | Mod: CPTII,S$GLB,, | Performed by: FAMILY MEDICINE

## 2019-09-10 PROCEDURE — 3074F PR MOST RECENT SYSTOLIC BLOOD PRESSURE < 130 MM HG: ICD-10-PCS | Mod: CPTII,S$GLB,, | Performed by: FAMILY MEDICINE

## 2019-09-10 PROCEDURE — 99214 PR OFFICE/OUTPT VISIT, EST, LEVL IV, 30-39 MIN: ICD-10-PCS | Mod: S$GLB,,, | Performed by: FAMILY MEDICINE

## 2019-09-10 PROCEDURE — 3078F DIAST BP <80 MM HG: CPT | Mod: CPTII,S$GLB,, | Performed by: FAMILY MEDICINE

## 2019-09-10 PROCEDURE — 81000 URINALYSIS NONAUTO W/SCOPE: CPT | Mod: PO

## 2019-09-10 PROCEDURE — 3008F BODY MASS INDEX DOCD: CPT | Mod: CPTII,S$GLB,, | Performed by: FAMILY MEDICINE

## 2019-09-10 PROCEDURE — 99999 PR PBB SHADOW E&M-EST. PATIENT-LVL IV: ICD-10-PCS | Mod: PBBFAC,,, | Performed by: FAMILY MEDICINE

## 2019-09-10 RX ORDER — EZETIMIBE 10 MG/1
10 TABLET ORAL DAILY
Qty: 90 TABLET | Refills: 3 | Status: SHIPPED | OUTPATIENT
Start: 2019-09-10 | End: 2020-05-29

## 2019-09-10 RX ORDER — GLUCOSAMINE/CHONDR SU A SOD 167-133 MG
500 CAPSULE ORAL NIGHTLY
Refills: 0 | COMMUNITY
Start: 2019-09-10 | End: 2019-09-18

## 2019-09-10 NOTE — ASSESSMENT & PLAN NOTE
Reviewed recent blood work.  Overall inflammatory markers are not elevated however her cholesterol numbers have worsened.    Urinalysis shows trace blood.  Working up with repeat urinalysis.    Complete history and physical was completed today.  Complete and thorough medication reconciliation was performed.  Discussed risks and benefits of medications.  Advised patient on orders and health maintenance.  We discussed old records and old labs if available.  Will request any records not available through epic.  Continue current medications listed on your summary sheet.

## 2019-09-10 NOTE — PATIENT INSTRUCTIONS
Ezetimibe Tablets  What is this medicine?  EZETIMIBE (ez ET i mibe) blocks the absorption of cholesterol from the stomach. It can help lower blood cholesterol for patients who are at risk of getting heart disease or a stroke. It is only for patients whose cholesterol level is not controlled by diet.  How should I use this medicine?  Take this medicine by mouth with a glass of water. Follow the directions on the prescription label. This medicine can be taken with or without food. Take your doses at regular intervals. Do not take your medicine more often than directed.  Talk to your pediatrician regarding the use of this medicine in children. Special care may be needed.  What side effects may I notice from receiving this medicine?  Side effects that you should report to your doctor or health care professional as soon as possible:  · allergic reactions like skin rash, itching or hives, swelling of the face, lips, or tongue  · dark yellow or brown urine  · unusually weak or tired  · yellowing of the skin or eyes  Side effects that usually do not require medical attention (report to your doctor or health care professional if they continue or are bothersome):  · diarrhea  · dizziness  · headache  · stomach upset or pain  What may interact with this medicine?  Do not take this medicine with any of the following medications:  · fenofibrate  · gemfibrozil  This medicine may also interact with the following medications:  · antacids  · cyclosporine  · herbal medicines like red yeast rice  · other medicines to lower cholesterol or triglycerides  What if I miss a dose?  If you miss a dose, take it as soon as you can. If it is almost time for your next dose, take only that dose. Do not take double or extra doses.  Where should I keep my medicine?  Keep out of the reach of children.  Store at room temperature between 15 and 30 degrees C (59 and 86 degrees F). Protect from moisture. Keep container tightly closed. Throw away any  unused medicine after the expiration date.  What should I tell my health care provider before I take this medicine?  They need to know if you have any of these conditions:  · liver disease  · an unusual or allergic reaction to ezetimibe, medicines, foods, dyes, or preservatives  · pregnant or trying to get pregnant  · breast-feeding  What should I watch for while using this medicine?  Visit your doctor or health care professional for regular checks on your progress. You will need to have your cholesterol levels checked. If you are also taking some other cholesterol medicines, you will also need to have tests to make sure your liver is working properly.  Tell your doctor or health care professional if you get any unexplained muscle pain, tenderness, or weakness, especially if you also have a fever and tiredness.  You need to follow a low-cholesterol, low-fat diet while you are taking this medicine. This will decrease your risk of getting heart and blood vessel disease. Exercising and avoiding alcohol and smoking can also help. Ask your doctor or dietician for advice.  NOTE:This sheet is a summary. It may not cover all possible information. If you have questions about this medicine, talk to your doctor, pharmacist, or health care provider. Copyright© 2017 [employer]

## 2019-09-10 NOTE — PROGRESS NOTES
Please call patient with lab results.  Let patient know she still has blood but it is a decreased amount for previous.  Tell the lab to check on microscopic for red blood cells

## 2019-09-10 NOTE — PROGRESS NOTES
Subjective:      Patient ID: Viridiana Ross is a 54 y.o. female.    Chief Complaint: Results; Hyperlipidemia; Hypertension; Vitamin D Deficiency; and Hematuria      Problem List Items Addressed This Visit     Essential hypertension    Overview     Initial HPI:  Chronic.  Stable.  Well controlled.  Has been present for many years.  Taking hydrochlorothiazide.  Reports compliance.  No side effects reported.  Denies any chest pain shortness of breath blurred vision.  Patient does have a strong family history of heart attacks and strokes in the family.    09/10/2019  Patient is compliant with hydrochlorothiazide 25 mg.  Patient has been participating in the digital medicine hypertension program.  Blood pressure is low normal on most occasions.  Patient does have lower extremity edema occasionally which is resolved with the hydrochlorothiazide.  Denies any side effects.  Denies any chest pain shortness of breath blurred vision.  Dizziness or lightheadedness.         Current Assessment & Plan     Decreased hydrochlorothiazide 12.5 mg.  See if she still getting control with blood pressure and edema.  Patient may not need that strong of a dose.    Discussed hypertension disease course.  Discussed-DASH-diet and exercise.  Discussed medication regimen importance of treating high blood pressure.  Patient understood and advised of risk of untreated blood pressure.  ER precautions were given for symptoms of hypertensive urgency and emergency.           Hyperlipidemia - Primary    Overview     Lab Results   Component Value Date    CHOL 212 (H) 09/15/2017    CHOL 234 (H) 06/09/2017    CHOL 275 (H) 12/09/2016     Lab Results   Component Value Date    HDL 68 09/15/2017    HDL 75 06/09/2017    HDL 76 (H) 12/09/2016     Lab Results   Component Value Date    LDLCALC 135.0 09/15/2017    LDLCALC 144.4 06/09/2017    LDLCALC 184.0 (H) 12/09/2016     Lab Results   Component Value Date    TRIG 45 09/15/2017    TRIG 73 06/09/2017    TRIG 75  12/09/2016     Lab Results   Component Value Date    CHOLHDL 32.1 09/15/2017    CHOLHDL 32.1 06/09/2017    CHOLHDL 27.6 12/09/2016     Chronic.  Untreated.  Only using diet and exercise.  The most recent lipid panel was done by her OBGYN on May 31, 2019.  Showing total cholesterol is still elevated 286.  Triglycerides are normal at 82 HDL is therapeutic at 89 LDL is still high at 181.  Patient has strong family history of heart attacks in her family.  Thyroid studies were normal.  Testosterone is within normal limits.  Blood count and metabolic panel within normal limits  ==========================  Orders Only on 08/30/2019   Component Date Value Ref Range Status    LDL-P 08/30/2019 2,543* <1,000 nmol/L Final    Comment:                           Low                   < 1000                            Moderate         1000 - 1299                            Borderline-High  1300 - 1599                            High             1600 - 2000                            Very High             > 2000      LDL-C 08/30/2019 260* 0 - 99 mg/dL Final    Comment:                           Optimal               <  100                            Above optimal     100 -  129                            Borderline        130 -  159                            High              160 -  189                            Very high             >  189  LDL-C is inaccurate if patient is non-fasting.      HDL-C 08/30/2019 94  >39 mg/dL Final    Triglycerides 08/30/2019 64  0 - 149 mg/dL Final    Cholesterol, Total 08/30/2019 367* 100 - 199 mg/dL Final    HDL-P (Total) 08/30/2019 44.0  >=30.5 umol/L Final    Small LDL-P 08/30/2019 220  <=527 nmol/L Final    LDL Size 08/30/2019 22.1  >20.5 nm Final    Comment:  ----------------------------------------------------------                   ** INTERPRETATIVE INFORMATION**                   PARTICLE CONCENTRATION AND SIZE                      <--Lower CVD Risk   Higher CVD Risk-->    LDL AND  HDL PARTICLES   Percentile in Reference Population    HDL-P (total)        High     75th    50th    25th   Low                         >34.9    34.9    30.5    26.7   <26.7    Small LDL-P          Low      25th    50th    75th   High                         <117     117     527     839    >839    LDL Size   <-Large (Pattern A)->    <-Small (Pattern B)->                      23.0    20.6           20.5      19.0   ----------------------------------------------------------  Small LDL-P and LDL Size are associated with CVD risk, but not after  LDL-P is taken into account.  These assays were developed and their performance characteristics  determined by [employer]. These assays have not been cleared by the  [place] Food and Drug Administration. The clinical utility o                           f these  laboratory values have not been fully established.      LP-IR Score 08/30/2019 <25  <=45 Final    Comment: INSULIN RESISTANCE MARKER      <--Insulin Sensitive    Insulin Resistant-->             Percentile in Reference Population  Insulin Resistance Score  LP-IR Score   Low   25th   50th   75th   High                <27   27     45     63     >63  LP-IR Score is inaccurate if patient is non-fasting.  The LP-IR score is a laboratory developed index that has been  associated with insulin resistance and diabetes risk and should be  used as one component of a physician's clinical assessment. The  LP-IR score listed above has not been cleared by the [place] Food and  Drug Administration.       Patient does not want to take a statin at this time.  But is willing to take Zetia and fish oil and niacin.  Patient is concerned about her strong family history of heart attacks and strokes.  Patient denies any symptoms of chest pain shortness of breath blurry vision dizziness lightheadedness abdominal pain etc.         Current Assessment & Plan     Patient's cholesterol numbers are elevated on keto diet.  However patient does have a favorable  pattern a LDL with increased particle size and low LP IR score.  Patient also has low homocystine ESR and CRP numbers.  However with patient's extremely elevated number recommend therapy to reduce this LDL burden.  Patient would also like CT calcium score.  Referring to Cardiology for further evaluation and treatment.  Starting Zetia today.  Starting fish oil and niacin.  Will repeat lipid panel in 3 months.    Patient was advised statin is first-line therapy for reducing LDL at this time.  Also educated about other options such as Repatha.  Patient defers statin therapy at this time.                                Discussed hyperlipidemia disease course.  Discussed the risk of cardiovascular disease, increase stroke and heart attack risk.  Patient voiced understanding and understood the treatment plan. All questions were answered.  Discussed healthy diet and increased need for exercise.           Encounter for long-term (current) use of medications    Overview     06/27/2019   Patient is on CHRONIC long-term drug therapy for managed conditions. See medication list. Reports compliance.  No side effects reported.  Routine lab work is being monitored.  Patient does not need refills today.     Lab Results   Component Value Date    WBC 14.74 (H) 05/15/2018    HGB 14.3 05/15/2018    HCT 42.0 05/15/2018    MCV 91 05/15/2018     05/15/2018      CMP  Sodium   Date Value Ref Range Status   05/15/2018 136 136 - 145 mmol/L Final     Potassium   Date Value Ref Range Status   05/15/2018 3.5 3.5 - 5.1 mmol/L Final     Chloride   Date Value Ref Range Status   05/15/2018 99 95 - 110 mmol/L Final     CO2   Date Value Ref Range Status   05/15/2018 28 22 - 31 mmol/L Final     Glucose   Date Value Ref Range Status   05/15/2018 105 70 - 110 mg/dL Final     Comment:     The ADA recommends the following guidelines for fasting glucose:  Normal:       less than 100 mg/dL  Prediabetes:  100 mg/dL to 125 mg/dL  Diabetes:     126 mg/dL  or higher       BUN, Bld   Date Value Ref Range Status   05/15/2018 17 7 - 18 mg/dL Final     Creatinine   Date Value Ref Range Status   05/15/2018 0.90 0.50 - 1.40 mg/dL Final     Calcium   Date Value Ref Range Status   05/15/2018 9.8 8.4 - 10.2 mg/dL Final     Total Protein   Date Value Ref Range Status   05/15/2018 7.4 6.0 - 8.4 g/dL Final     Albumin   Date Value Ref Range Status   05/15/2018 4.3 3.5 - 5.2 g/dL Final     Total Bilirubin   Date Value Ref Range Status   05/15/2018 1.3 0.2 - 1.3 mg/dL Final     Alkaline Phosphatase   Date Value Ref Range Status   05/15/2018 84 38 - 145 U/L Final     AST   Date Value Ref Range Status   05/15/2018 34 14 - 36 U/L Final     ALT   Date Value Ref Range Status   05/15/2018 37 10 - 44 U/L Final     Anion Gap   Date Value Ref Range Status   05/15/2018 9 8 - 16 mmol/L Final     eGFR if    Date Value Ref Range Status   05/15/2018 >60 >60 mL/min/1.73 m^2 Final     eGFR if non    Date Value Ref Range Status   05/15/2018 >60 >60 mL/min/1.73 m^2 Final     Comment:     Calculation used to obtain the estimated glomerular filtration  rate (eGFR) is the CKD-EPI equation.        Lab Results   Component Value Date    TSH 2.108 12/09/2016    =====================================    09/10/2019  Update.  Patient here with concerns about her recent lipid panel.  Patient reports compliance with hydrochlorothiazide for hypertension and lower extremity edema.  Blood pressure is low normal today.  Patient compliant with other medications as well.  Patient not currently taking anything for cholesterol.    Patient does do KETO diet which may have made her cholesterol worse.         Current Assessment & Plan     Reviewed recent blood work.  Overall inflammatory markers are not elevated however her cholesterol numbers have worsened.    Urinalysis shows trace blood.  Working up with repeat urinalysis.    Complete history and physical was completed today.  Complete  and thorough medication reconciliation was performed.  Discussed risks and benefits of medications.  Advised patient on orders and health maintenance.  We discussed old records and old labs if available.  Will request any records not available through epic.  Continue current medications listed on your summary sheet.           Vitamin D deficiency    Overview       2019  Vit d deficiency. Takes vitamin d supplement. No SE reported. Fatigue is slightly improved.   09/10/2019  Patient reports taking vitamin-D.  Fatigue is improved.  No side effects reported.  Vitamin-D level is adequate.         Current Assessment & Plan     Continue supplementation.  Check level in a few months.         FH: heart disease    Overview     Reports father had stents and heart attacks.         Asymptomatic microscopic hematuria    Overview     Previous urinalysis had microscopic hematuria.  Checking repeat urinalysis today.  Patient did have ketones in the urine as well however she is on the ketone diet which is expected.         Current Assessment & Plan     Recheck urinalysis.                Past Medical History:  Past Medical History:   Diagnosis Date    Acne     Diverticulosis     Hypertension     IBS (irritable bowel syndrome)      Past Surgical History:   Procedure Laterality Date     SECTION      EYE SURGERY      HYSTERECTOMY      TUBAL LIGATION  1994     Review of patient's allergies indicates:  No Known Allergies  Medication List with Changes/Refills   New Medications    EZETIMIBE (ZETIA) 10 MG TABLET    Take 1 tablet (10 mg total) by mouth once daily.    NIACIN 500 MG TAB    Take 1 tablet (500 mg total) by mouth every evening.   Current Medications    ACANYA 1.2-2.5 % GLWP    1 g by Other route once daily. Apply 1 g to face daily    CALCIUM CARBONATE (OS-BENITA) 600 MG (1,500 MG) TAB    Take 600 mg by mouth once daily.    DICYCLOMINE (BENTYL) 10 MG CAPSULE        ESTRADIOL 0.05 MG/24 HR TD PTSW  (VIVELLE-DOT) 0.05 MG/24 HR    Place 1 patch onto the skin twice a week.    HYDROCHLOROTHIAZIDE (HYDRODIURIL) 25 MG TABLET    Take 25 mg by mouth once daily.    SACCHAROMYCES BOULARDII (FLORASTOR) 250 MG CAPSULE    Take 250 mg by mouth once daily.      Social History     Tobacco Use    Smoking status: Never Smoker    Smokeless tobacco: Never Used   Substance Use Topics    Alcohol use: Yes     Alcohol/week: 2.4 oz     Types: 2 Glasses of wine, 2 Standard drinks or equivalent per week     Frequency: Never     Comment: socially      Family History   Problem Relation Age of Onset    Hyperlipidemia Mother     Hypertension Mother     Diabetes Mother     Cancer Mother     Diverticulitis Mother     Alzheimer's disease Mother     Depression Mother     Hearing loss Mother     Heart disease Mother     Hypertension Father     Hyperlipidemia Father     Stroke Father     Hearing loss Father     Heart disease Father     Diverticulitis Sister     Cancer Brother     Cancer Brother     Cancer Brother         Nasopharyngeal - bladder       I have reviewed the complete PMH, social history, surgical history, allergies and medications.  As well as family history.    Review of Systems   Constitutional: Negative for activity change and unexpected weight change.   HENT: Negative for hearing loss, rhinorrhea and trouble swallowing.    Eyes: Negative for discharge and visual disturbance.   Respiratory: Negative for chest tightness and wheezing.    Cardiovascular: Negative for chest pain and palpitations.   Gastrointestinal: Negative for blood in stool, constipation, diarrhea and vomiting.   Endocrine: Negative for polydipsia and polyuria.   Genitourinary: Negative for difficulty urinating, dysuria, hematuria and menstrual problem.   Musculoskeletal: Negative for arthralgias, joint swelling and neck pain.   Neurological: Negative for weakness and headaches.   Psychiatric/Behavioral: Negative for confusion and dysphoric  "mood.       Objective:     /77   Pulse (!) 56   Temp 97.9 °F (36.6 °C)   Ht 5' 6" (1.676 m)   Wt 59.4 kg (131 lb)   BMI 21.14 kg/m²     Physical Exam   Constitutional: She is oriented to person, place, and time. She appears well-developed and well-nourished. No distress.   HENT:   Head: Normocephalic and atraumatic.   Eyes: Pupils are equal, round, and reactive to light. EOM are normal.   Neck: Normal range of motion. Neck supple.   Cardiovascular: Normal rate, regular rhythm, normal heart sounds and intact distal pulses.   No murmur heard.  Pulses:       Carotid pulses are 2+ on the right side, and 2+ on the left side.  Pulmonary/Chest: Effort normal and breath sounds normal. No respiratory distress. She has no wheezes.   Abdominal: Soft. Bowel sounds are normal. She exhibits no distension.   Musculoskeletal: Normal range of motion. She exhibits no edema.   Neurological: She is alert and oriented to person, place, and time. No cranial nerve deficit.   Skin: Skin is warm and dry. Capillary refill takes less than 2 seconds.   Psychiatric: She has a normal mood and affect. Her behavior is normal.   Nursing note and vitals reviewed.      Assessment:     1. Familial hypercholesterolemia    2. Essential hypertension    3. Encounter for long-term (current) use of medications    4. Vitamin D deficiency    5. FH: heart disease    6. Asymptomatic microscopic hematuria        Plan:     I have Reviewed and summarized old records.  I have performed thorough medication reconciliation today and discussed risk and benefits of each medication.  I have reviewed labs and discussed with patient.  All questions were answered.  I am requesting old records and will review them once they are available.    Problem List Items Addressed This Visit     Essential hypertension     Decreased hydrochlorothiazide 12.5 mg.  See if she still getting control with blood pressure and edema.  Patient may not need that strong of a " dose.    Discussed hypertension disease course.  Discussed-DASH-diet and exercise.  Discussed medication regimen importance of treating high blood pressure.  Patient understood and advised of risk of untreated blood pressure.  ER precautions were given for symptoms of hypertensive urgency and emergency.           Relevant Orders    Ambulatory referral to Cardiology    Hyperlipidemia - Primary     Patient's cholesterol numbers are elevated on keto diet.  However patient does have a favorable pattern a LDL with increased particle size and low LP IR score.  Patient also has low homocystine ESR and CRP numbers.  However with patient's extremely elevated number recommend therapy to reduce this LDL burden.  Patient would also like CT calcium score.  Referring to Cardiology for further evaluation and treatment.  Starting Zetia today.  Starting fish oil and niacin.  Will repeat lipid panel in 3 months.    Patient was advised statin is first-line therapy for reducing LDL at this time.  Also educated about other options such as Repatha.  Patient defers statin therapy at this time.                                Discussed hyperlipidemia disease course.  Discussed the risk of cardiovascular disease, increase stroke and heart attack risk.  Patient voiced understanding and understood the treatment plan. All questions were answered.  Discussed healthy diet and increased need for exercise.           Relevant Medications    ezetimibe (ZETIA) 10 mg tablet    niacin 500 MG Tab    Other Relevant Orders    Ambulatory referral to Cardiology    Encounter for long-term (current) use of medications     Reviewed recent blood work.  Overall inflammatory markers are not elevated however her cholesterol numbers have worsened.    Urinalysis shows trace blood.  Working up with repeat urinalysis.    Complete history and physical was completed today.  Complete and thorough medication reconciliation was performed.  Discussed risks and benefits of  medications.  Advised patient on orders and health maintenance.  We discussed old records and old labs if available.  Will request any records not available through epic.  Continue current medications listed on your summary sheet.           Vitamin D deficiency     Continue supplementation.  Check level in a few months.         FH: heart disease    Relevant Medications    niacin 500 MG Tab    Other Relevant Orders    Ambulatory referral to Cardiology    Asymptomatic microscopic hematuria     Recheck urinalysis.         Relevant Orders    URINALYSIS          Follow up in about 3 months (around 12/10/2019), or if symptoms worsen or fail to improve, for HLD .    If no improvement in symptoms or symptoms worsen, advised to call/follow-up at clinic or go to ER. Patient voiced understanding and all questions/concerns were addressed.     DISCLAIMER: This note was compiled by using a speech recognition dictation system and therefore please be aware that typographical / speech recognition errors can and do occur.  Please contact me if you see any errors specifically.    Ranjeet Coleman MD  We Offer Telehealth & Same Day Appointments!   Book your Telehealth appointment with me through my nurse or   Clinic appointments on [employer]!    Office: [phone]          Check out my Facebook Page and Follow Me at: CLICK HERE    Check out my website at [employer] by clicking on: CLICK HERE    To Schedule appointments online, go to [employer]: CLICK HERE     Location: [email or web address]    [address] [place] [address]    FAX: [phone]

## 2019-09-10 NOTE — ASSESSMENT & PLAN NOTE
Patient's cholesterol numbers are elevated on keto diet.  However patient does have a favorable pattern a LDL with increased particle size and low LP IR score.  Patient also has low homocystine ESR and CRP numbers.  However with patient's extremely elevated number recommend therapy to reduce this LDL burden.  Patient would also like CT calcium score.  Referring to Cardiology for further evaluation and treatment.  Starting Zetia today.  Starting fish oil and niacin.  Will repeat lipid panel in 3 months.    Patient was advised statin is first-line therapy for reducing LDL at this time.  Also educated about other options such as Repatha.  Patient defers statin therapy at this time.                                Discussed hyperlipidemia disease course.  Discussed the risk of cardiovascular disease, increase stroke and heart attack risk.  Patient voiced understanding and understood the treatment plan. All questions were answered.  Discussed healthy diet and increased need for exercise.

## 2019-09-10 NOTE — ASSESSMENT & PLAN NOTE
Decreased hydrochlorothiazide 12.5 mg.  See if she still getting control with blood pressure and edema.  Patient may not need that strong of a dose.    Discussed hypertension disease course.  Discussed-DASH-diet and exercise.  Discussed medication regimen importance of treating high blood pressure.  Patient understood and advised of risk of untreated blood pressure.  ER precautions were given for symptoms of hypertensive urgency and emergency.

## 2019-09-11 ENCOUNTER — PATIENT MESSAGE (OUTPATIENT)
Dept: FAMILY MEDICINE | Facility: CLINIC | Age: 54
End: 2019-09-11

## 2019-09-11 ENCOUNTER — TELEPHONE (OUTPATIENT)
Dept: FAMILY MEDICINE | Facility: CLINIC | Age: 54
End: 2019-09-11

## 2019-09-11 DIAGNOSIS — R31.21 ASYMPTOMATIC MICROSCOPIC HEMATURIA: Primary | ICD-10-CM

## 2019-09-11 NOTE — TELEPHONE ENCOUNTER
----- Message from Rajneet Coleman MD sent at 9/11/2019 11:34 AM CDT -----  Please call to make sure patient get the results.  Persistent hematuria.  Refer to Urology.

## 2019-09-11 NOTE — PROGRESS NOTES
Digital Medicine: Health  Follow-Up      Hypertension           Diet:   Patient reports eating or drinking the following: fresh vegetables and 50% fat, 25% CHO, 25% protein + vegetables    Ms. Kemp has been coming off of the keto diet due to very high cholesterol readings and she is slowly starting to add carbohydrates back      Physical Activity:   When asked if exercising, patient responded: yes3 day(s) a week.      Patient participates in the following activities: biking    Has a biking event Oct. 11    Medication Adherence:       Patient's doctor reduced her medication      SDOH Deferred    INTERVENTION(S)  encouragement/support    PLAN  patient verbalizes understanding      There are no preventive care reminders to display for this patient.    Last 5 Patient Entered Readings                                      Current 30 Day Average: 107/75     Recent Readings 9/9/2019 9/8/2019 9/5/2019 9/3/2019 9/2/2019    SBP (mmHg) 109 104 115 101 101    DBP (mmHg) 81 71 85 77 74    Pulse 86 72 84 74 75

## 2019-09-16 ENCOUNTER — PATIENT OUTREACH (OUTPATIENT)
Dept: OTHER | Facility: OTHER | Age: 54
End: 2019-09-16

## 2019-09-16 NOTE — PROGRESS NOTES
09/16/19 9:05 AM - Called patient and left voicemail with call back number. Will follow up with patient at next encounter.

## 2019-09-18 ENCOUNTER — OFFICE VISIT (OUTPATIENT)
Dept: UROLOGY | Facility: CLINIC | Age: 54
End: 2019-09-18
Payer: COMMERCIAL

## 2019-09-18 VITALS
HEIGHT: 66 IN | WEIGHT: 131.81 LBS | HEART RATE: 71 BPM | DIASTOLIC BLOOD PRESSURE: 80 MMHG | BODY MASS INDEX: 21.18 KG/M2 | SYSTOLIC BLOOD PRESSURE: 120 MMHG

## 2019-09-18 DIAGNOSIS — R31.29 MICROSCOPIC HEMATURIA: Primary | ICD-10-CM

## 2019-09-18 LAB
BILIRUB SERPL-MCNC: NORMAL MG/DL
BLOOD URINE, POC: NORMAL
COLOR, POC UA: NORMAL
GLUCOSE UR QL STRIP: NORMAL
KETONES UR QL STRIP: NORMAL
LEUKOCYTE ESTERASE URINE, POC: NORMAL
NITRITE, POC UA: NORMAL
PH, POC UA: 6
PROTEIN, POC: NORMAL
SPECIFIC GRAVITY, POC UA: 1.02
UROBILINOGEN, POC UA: 0.2

## 2019-09-18 PROCEDURE — 99999 PR PBB SHADOW E&M-EST. PATIENT-LVL IV: ICD-10-PCS | Mod: PBBFAC,,, | Performed by: UROLOGY

## 2019-09-18 PROCEDURE — 3079F DIAST BP 80-89 MM HG: CPT | Mod: CPTII,S$GLB,, | Performed by: UROLOGY

## 2019-09-18 PROCEDURE — 88112 CYTOPATH CELL ENHANCE TECH: CPT | Performed by: PATHOLOGY

## 2019-09-18 PROCEDURE — 3079F PR MOST RECENT DIASTOLIC BLOOD PRESSURE 80-89 MM HG: ICD-10-PCS | Mod: CPTII,S$GLB,, | Performed by: UROLOGY

## 2019-09-18 PROCEDURE — 3008F BODY MASS INDEX DOCD: CPT | Mod: CPTII,S$GLB,, | Performed by: UROLOGY

## 2019-09-18 PROCEDURE — 3008F PR BODY MASS INDEX (BMI) DOCUMENTED: ICD-10-PCS | Mod: CPTII,S$GLB,, | Performed by: UROLOGY

## 2019-09-18 PROCEDURE — 99204 PR OFFICE/OUTPT VISIT, NEW, LEVL IV, 45-59 MIN: ICD-10-PCS | Mod: 25,S$GLB,, | Performed by: UROLOGY

## 2019-09-18 PROCEDURE — 99999 PR PBB SHADOW E&M-EST. PATIENT-LVL IV: CPT | Mod: PBBFAC,,, | Performed by: UROLOGY

## 2019-09-18 PROCEDURE — 81002 URINALYSIS NONAUTO W/O SCOPE: CPT | Mod: S$GLB,,, | Performed by: UROLOGY

## 2019-09-18 PROCEDURE — 88112 CYTOLOGY SPECIMEN-URINE: ICD-10-PCS | Mod: 26,,, | Performed by: PATHOLOGY

## 2019-09-18 PROCEDURE — 3074F SYST BP LT 130 MM HG: CPT | Mod: CPTII,S$GLB,, | Performed by: UROLOGY

## 2019-09-18 PROCEDURE — 81002 POCT URINE DIPSTICK WITHOUT MICROSCOPE: ICD-10-PCS | Mod: S$GLB,,, | Performed by: UROLOGY

## 2019-09-18 PROCEDURE — 99204 OFFICE O/P NEW MOD 45 MIN: CPT | Mod: 25,S$GLB,, | Performed by: UROLOGY

## 2019-09-18 PROCEDURE — 3074F PR MOST RECENT SYSTOLIC BLOOD PRESSURE < 130 MM HG: ICD-10-PCS | Mod: CPTII,S$GLB,, | Performed by: UROLOGY

## 2019-09-18 NOTE — LETTER
September 18, 2019      Ranjeet Coleman MD  [address] [place] [address]           [facility]  [address] [facility] [address] [place] [address]  Phone: [phone]  Fax: [phone]          Patient: Viridiana Ross   MR Number: [id number]   YOB: 1965   Date of Visit: 9/18/2019       Dear Dr. Ranjeet Coleman:    Thank you for referring Viridiana Ross to me for evaluation. Attached you will find relevant portions of my assessment and plan of care.    If you have questions, please do not hesitate to call me. I look forward to following Viridiana Ross along with you.    Sincerely,    Fred Aranda MD    Enclosure  CC:  No Recipients    If you would like to receive this communication electronically, please contact [email or web address] or [phone] to request more information on [employer] Link access.    For providers and/or their staff who would like to refer a patient to [facility], please contact us through our one-stop-shop provider referral line, [facility], at [phone].    If you feel you have received this communication in error or would no longer like to receive these types of communications, please e-mail [email or web address]

## 2019-09-18 NOTE — PROGRESS NOTES
Subjective:       Patient ID: Viridiana Ross is a 54 y.o. female.    Chief Complaint: Hematuria    Patient states she has a history of microhematuria. Denies gross hematuria  No flank or suprapubic pain  No voiding issues    Past Medical History:   Diagnosis Date    Acne     Diverticulosis     Hypertension     IBS (irritable bowel syndrome)       Past Surgical History:   Procedure Laterality Date     SECTION      EYE SURGERY      HYSTERECTOMY      TUBAL LIGATION  1994     Social History     Socioeconomic History    Marital status:      Spouse name: Not on file    Number of children: 3    Years of education: Not on file    Highest education level: Not on file   Occupational History    Not on file   Social Needs    Financial resource strain: Not hard at all    Food insecurity:     Worry: Never true     Inability: Never true    Transportation needs:     Medical: No     Non-medical: No   Tobacco Use    Smoking status: Never Smoker    Smokeless tobacco: Never Used   Substance and Sexual Activity    Alcohol use: Yes     Alcohol/week: 2.4 oz     Types: 2 Glasses of wine, 2 Standard drinks or equivalent per week     Frequency: Never     Comment: socially    Drug use: Never    Sexual activity: Yes     Partners: Male     Birth control/protection: Post-menopausal   Lifestyle    Physical activity:     Days per week: 5 days     Minutes per session: 60 min    Stress: Only a little   Relationships    Social connections:     Talks on phone: More than three times a week     Gets together: Three times a week     Attends [employer] service: 1 to 4 times per year     Active member of club or organization: No     Attends meetings of clubs or organizations: Never     Relationship status:    Other Topics Concern    Not on file   Social History Narrative    Not on file       Family History   Problem Relation Age of Onset    Hyperlipidemia Mother     Hypertension Mother     Diabetes  Mother     Cancer Mother     Diverticulitis Mother     Alzheimer's disease Mother     Depression Mother     Hearing loss Mother     Heart disease Mother     Hypertension Father     Hyperlipidemia Father     Stroke Father     Hearing loss Father     Heart disease Father     Diverticulitis Sister     Cancer Brother     Cancer Brother     Cancer Brother         Nasopharyngeal - bladder      Review of patient's allergies indicates:  No Known Allergies  Medication List with Changes/Refills   Current Medications    ACANYA 1.2-2.5 % GLWP    1 g by Other route once daily. Apply 1 g to face daily    CALCIUM CARBONATE (OS-BENITA) 600 MG (1,500 MG) TAB    Take 600 mg by mouth once daily.    DICYCLOMINE (BENTYL) 10 MG CAPSULE    daily as needed.     ESTRADIOL 0.05 MG/24 HR TD PTSW (VIVELLE-DOT) 0.05 MG/24 HR    Place 1 patch onto the skin twice a week.    EZETIMIBE (ZETIA) 10 MG TABLET    Take 1 tablet (10 mg total) by mouth once daily.    HYDROCHLOROTHIAZIDE (HYDRODIURIL) 25 MG TABLET    Take 12.5 mg by mouth once daily.     SACCHAROMYCES BOULARDII (FLORASTOR) 250 MG CAPSULE    Take 250 mg by mouth once daily.   Discontinued Medications    NIACIN 500 MG TAB    Take 1 tablet (500 mg total) by mouth every evening.      Review of Systems   Constitutional: Negative.    HENT: Negative.    Eyes: Negative.    Respiratory: Negative.    Cardiovascular: Negative.    Endocrine: Negative.    Genitourinary: Negative.    Musculoskeletal: Negative.    Skin: Negative.    Allergic/Immunologic: Negative.    Neurological: Negative.    Hematological: Negative.    Psychiatric/Behavioral: Negative.        Objective:      Physical Exam   Nursing note and vitals reviewed.  Constitutional: She is oriented to person, place, and time. She appears well-developed and well-nourished.   HENT:   Head: Normocephalic and atraumatic.   Eyes: EOM are normal. Pupils are equal, round, and reactive to light.   Neck: Normal range of motion.    Cardiovascular: Normal rate.    Pulmonary/Chest: Effort normal.   Abdominal: Soft.   Musculoskeletal: Normal range of motion.   Neurological: She is alert and oriented to person, place, and time.   Skin: Skin is warm and dry.     Psychiatric: She has a normal mood and affect. Her behavior is normal. Judgment and thought content normal.         Sodium   Date Value Ref Range Status   05/15/2018 136 136 - 145 mmol/L Final     Potassium   Date Value Ref Range Status   05/15/2018 3.5 3.5 - 5.1 mmol/L Final     Chloride   Date Value Ref Range Status   05/15/2018 99 95 - 110 mmol/L Final     CO2   Date Value Ref Range Status   05/15/2018 28 22 - 31 mmol/L Final     Creatinine   Date Value Ref Range Status   05/15/2018 0.90 0.50 - 1.40 mg/dL Final     Glucose   Date Value Ref Range Status   05/15/2018 105 70 - 110 mg/dL Final     Comment:     The ADA recommends the following guidelines for fasting glucose:  Normal:       less than 100 mg/dL  Prediabetes:  100 mg/dL to 125 mg/dL  Diabetes:     126 mg/dL or higher       AST   Date Value Ref Range Status   05/15/2018 34 14 - 36 U/L Final     ALT   Date Value Ref Range Status   05/15/2018 37 10 - 44 U/L Final     WBC   Date Value Ref Range Status   05/15/2018 14.74 (H) 3.90 - 12.70 K/uL Final     Hemoglobin   Date Value Ref Range Status   05/15/2018 14.3 12.0 - 16.0 g/dL Final     Hematocrit   Date Value Ref Range Status   05/15/2018 42.0 37.0 - 48.5 % Final     Platelets   Date Value Ref Range Status   05/15/2018 211 150 - 350 K/uL Final          Assessment/Plan:       Microhematuria - Will plan cystoscopy with bilateral RPG                            - send urine for cytology                            - retroperitoneal ultrasound    Problem List Items Addressed This Visit     None      Visit Diagnoses     Microscopic hematuria    -  Primary    Relevant Orders    POCT urine dipstick without microscope (Completed)

## 2019-09-25 ENCOUNTER — HOSPITAL ENCOUNTER (OUTPATIENT)
Dept: RADIOLOGY | Facility: HOSPITAL | Age: 54
Discharge: HOME | End: 2019-09-25
Attending: UROLOGY
Payer: COMMERCIAL

## 2019-09-25 DIAGNOSIS — R31.29 MICROSCOPIC HEMATURIA: ICD-10-CM

## 2019-09-25 PROCEDURE — 76770 US RETROPERITONEAL COMPLETE: ICD-10-PCS | Mod: 26,,, | Performed by: RADIOLOGY

## 2019-09-25 PROCEDURE — 76770 US EXAM ABDO BACK WALL COMP: CPT | Mod: 26,,, | Performed by: RADIOLOGY

## 2019-09-25 PROCEDURE — 76770 US EXAM ABDO BACK WALL COMP: CPT | Mod: TC,PO

## 2019-09-30 ENCOUNTER — OFFICE VISIT (OUTPATIENT)
Dept: CARDIOLOGY | Facility: CLINIC | Age: 54
End: 2019-09-30
Payer: COMMERCIAL

## 2019-09-30 VITALS
WEIGHT: 134.06 LBS | DIASTOLIC BLOOD PRESSURE: 82 MMHG | SYSTOLIC BLOOD PRESSURE: 129 MMHG | HEIGHT: 66 IN | HEART RATE: 63 BPM | BODY MASS INDEX: 21.55 KG/M2

## 2019-09-30 DIAGNOSIS — R01.1 UNDIAGNOSED CARDIAC MURMURS: ICD-10-CM

## 2019-09-30 DIAGNOSIS — Z91.89 AT RISK FOR CORONARY ARTERY DISEASE: ICD-10-CM

## 2019-09-30 DIAGNOSIS — R00.1 BRADYCARDIA: ICD-10-CM

## 2019-09-30 DIAGNOSIS — E78.00 HYPERCHOLESTEROLEMIA: ICD-10-CM

## 2019-09-30 DIAGNOSIS — I10 ESSENTIAL HYPERTENSION: Primary | ICD-10-CM

## 2019-09-30 PROBLEM — Z00.01 ENCOUNTER FOR GENERAL ADULT MEDICAL EXAMINATION WITH ABNORMAL FINDINGS: Status: RESOLVED | Noted: 2019-06-27 | Resolved: 2019-09-30

## 2019-09-30 PROCEDURE — 93000 EKG 12-LEAD: ICD-10-PCS | Mod: S$GLB,,, | Performed by: INTERNAL MEDICINE

## 2019-09-30 PROCEDURE — 99999 PR PBB SHADOW E&M-EST. PATIENT-LVL III: CPT | Mod: PBBFAC,,, | Performed by: INTERNAL MEDICINE

## 2019-09-30 PROCEDURE — 3008F BODY MASS INDEX DOCD: CPT | Mod: CPTII,S$GLB,, | Performed by: INTERNAL MEDICINE

## 2019-09-30 PROCEDURE — 99204 PR OFFICE/OUTPT VISIT, NEW, LEVL IV, 45-59 MIN: ICD-10-PCS | Mod: S$GLB,,, | Performed by: INTERNAL MEDICINE

## 2019-09-30 PROCEDURE — 3074F PR MOST RECENT SYSTOLIC BLOOD PRESSURE < 130 MM HG: ICD-10-PCS | Mod: CPTII,S$GLB,, | Performed by: INTERNAL MEDICINE

## 2019-09-30 PROCEDURE — 99204 OFFICE O/P NEW MOD 45 MIN: CPT | Mod: S$GLB,,, | Performed by: INTERNAL MEDICINE

## 2019-09-30 PROCEDURE — 3079F PR MOST RECENT DIASTOLIC BLOOD PRESSURE 80-89 MM HG: ICD-10-PCS | Mod: CPTII,S$GLB,, | Performed by: INTERNAL MEDICINE

## 2019-09-30 PROCEDURE — 3008F PR BODY MASS INDEX (BMI) DOCUMENTED: ICD-10-PCS | Mod: CPTII,S$GLB,, | Performed by: INTERNAL MEDICINE

## 2019-09-30 PROCEDURE — 99999 PR PBB SHADOW E&M-EST. PATIENT-LVL III: ICD-10-PCS | Mod: PBBFAC,,, | Performed by: INTERNAL MEDICINE

## 2019-09-30 PROCEDURE — 3074F SYST BP LT 130 MM HG: CPT | Mod: CPTII,S$GLB,, | Performed by: INTERNAL MEDICINE

## 2019-09-30 PROCEDURE — 3079F DIAST BP 80-89 MM HG: CPT | Mod: CPTII,S$GLB,, | Performed by: INTERNAL MEDICINE

## 2019-09-30 PROCEDURE — 93000 ELECTROCARDIOGRAM COMPLETE: CPT | Mod: S$GLB,,, | Performed by: INTERNAL MEDICINE

## 2019-09-30 RX ORDER — TESTOSTERONE 50 MG/5G
GEL TRANSDERMAL DAILY
COMMUNITY
End: 2020-12-18

## 2019-09-30 RX ORDER — HYDROCHLOROTHIAZIDE 25 MG/1
12.5 TABLET ORAL EVERY OTHER DAY
Qty: 15 TABLET | Refills: 1 | Status: SHIPPED | OUTPATIENT
Start: 2019-09-30 | End: 2019-11-25

## 2019-09-30 NOTE — LETTER
October 4, 2019      Ranjeet Coleman MD  [address] [facility] [address] [place] [address]           [facility]  [address] [place] [address]  Phone: [phone]          Patient: Viridiana Ross   MR Number: [id number]   YOB: 1965   Date of Visit: 9/30/2019       Dear Dr. Ranjeet Coleman:    Thank you for referring Viridiana Ross to me for evaluation. Attached you will find relevant portions of my assessment and plan of care.    If you have questions, please do not hesitate to call me. I look forward to following Viridiana Ross along with you.    Sincerely,    Phyllis Jose MD    Enclosure  CC:  No Recipients    If you would like to receive this communication electronically, please contact [email or web address] or [phone] to request more information on [facility] access.    For providers and/or their staff who would like to refer a patient to [facility], please contact us through our one-stop-shop provider referral line, [facility], at [phone].    If you feel you have received this communication in error or would no longer like to receive these types of communications, please e-mail [email or web address]

## 2019-09-30 NOTE — PROGRESS NOTES
Subjective:    Patient ID:  Viridiana Ross is a 54 y.o. female who presents for Hyperlipidemia and Hypertension        Hypertension   This is a chronic problem. The problem is controlled. Pertinent negatives include no blurred vision, chest pain, malaise/fatigue, orthopnea, palpitations, PND or shortness of breath. Risk factors for coronary artery disease include dyslipidemia and family history. Past treatments include diuretics.   Hyperlipidemia   This is a chronic problem. The problem is controlled. Pertinent negatives include no chest pain, focal weakness or shortness of breath. Current antihyperlipidemic treatment includes ezetimibe.       NEW PT EVALUATION, FH OF CAD,ALSO STROKES,  LABILE BP, WAS ON LISINOPRIL, , TAKES HCTZ PRN,LAST , NO CHEST PAIN PER SE, NO SIGNIFICANT SHORTNESS OF BREATH, NO TIA TYPE SYMPTOMS, NO NEAR-SYNCOPE,  SEE ROS  Past Medical History:   Diagnosis Date    Acne     Diverticulosis     Encounter for blood transfusion     Hyperlipidemia     Hypertension     IBS (irritable bowel syndrome)      Past Surgical History:   Procedure Laterality Date     SECTION      X3    CYSTOSCOPY W/ RETROGRADES N/A 10/2/2019    Procedure: CYSTOSCOPY, WITH RETROGRADE PYELOGRAM;  Surgeon: Fred Aranda MD;  Location: [facility];  Service: Urology;  Laterality: N/A;    EYE SURGERY      HYSTERECTOMY      TUBAL LIGATION  1994     Family History   Problem Relation Age of Onset    Hyperlipidemia Mother     Hypertension Mother     Diabetes Mother     Cancer Mother     Diverticulitis Mother     Alzheimer's disease Mother     Depression Mother     Hearing loss Mother     Heart disease Mother     Hypertension Father     Hyperlipidemia Father     Stroke Father     Hearing loss Father     Heart disease Father     Diverticulitis Sister     Cancer Brother     Cancer Brother     Cancer Brother         Nasopharyngeal - bladder     Social History     Socioeconomic History     Marital status:      Spouse name: Not on file    Number of children: 3    Years of education: Not on file    Highest education level: Not on file   Occupational History    Not on file   Social Needs    Financial resource strain: Not hard at all    Food insecurity:     Worry: Never true     Inability: Never true    Transportation needs:     Medical: No     Non-medical: No   Tobacco Use    Smoking status: Never Smoker    Smokeless tobacco: Never Used   Substance and Sexual Activity    Alcohol use: Yes     Alcohol/week: 3.0 standard drinks     Types: 1 Glasses of wine, 2 Standard drinks or equivalent per week     Frequency: Never     Comment: daily    Drug use: Never    Sexual activity: Yes     Partners: Male     Birth control/protection: Post-menopausal   Lifestyle    Physical activity:     Days per week: 5 days     Minutes per session: 60 min    Stress: Only a little   Relationships    Social connections:     Talks on phone: More than three times a week     Gets together: Three times a week     Attends Shinto service: 1 to 4 times per year     Active member of club or organization: No     Attends meetings of clubs or organizations: Never     Relationship status:    Other Topics Concern    Not on file   Social History Narrative    Not on file       Review of patient's allergies indicates:  No Known Allergies    Current Outpatient Medications:     ACANYA 1.2-2.5 % GlwP, 1 g by Other route once daily. Apply 1 g to face daily, Disp: , Rfl:     calcium carbonate (OS-BENITA) 600 mg (1,500 mg) Tab, Take 600 mg by mouth once daily., Disp: , Rfl:     estradiol 0.05 mg/24 hr td ptsw (VIVELLE-DOT) 0.05 mg/24 hr, Place 1 patch onto the skin twice a week., Disp: , Rfl:     ezetimibe (ZETIA) 10 mg tablet, Take 1 tablet (10 mg total) by mouth once daily., Disp: 90 tablet, Rfl: 3    Saccharomyces boulardii (FLORASTOR) 250 mg capsule, Take 250 mg by mouth once daily., Disp: , Rfl:     testosterone  "(TESTIM) 50 mg/5 gram (1 %) Gel, Apply topically once daily., Disp: , Rfl:     dicyclomine (BENTYL) 10 MG capsule, daily as needed. , Disp: , Rfl:     hydroCHLOROthiazide (HYDRODIURIL) 25 MG tablet, Take 0.5 tablets (12.5 mg total) by mouth every other day., Disp: 15 tablet, Rfl: 1    Review of Systems   Constitution: Negative for chills, diaphoresis, malaise/fatigue and night sweats.   HENT: Negative for congestion, hearing loss and nosebleeds.    Eyes: Negative for blurred vision and visual disturbance.   Cardiovascular: Negative for chest pain, claudication, cyanosis, dyspnea on exertion, irregular heartbeat, leg swelling (OCC), near-syncope, orthopnea, palpitations, paroxysmal nocturnal dyspnea and syncope.   Respiratory: Negative for cough, hemoptysis, shortness of breath and wheezing.    Endocrine: Negative for cold intolerance, heat intolerance and polyuria.   Hematologic/Lymphatic: Negative for adenopathy. Does not bruise/bleed easily.   Skin: Negative for color change, itching and nail changes.   Musculoskeletal: Negative for back pain, falls and joint pain.   Gastrointestinal: Negative for abdominal pain, change in bowel habit, dysphagia, heartburn, hematemesis, jaundice, melena and vomiting.        IBS   Genitourinary: Negative for dysuria, flank pain and frequency. Hematuria: MICROSCOPIC,TO HAVE CYSTOSCOPE.   Neurological: Negative for brief paralysis, dizziness, focal weakness, light-headedness, loss of balance, numbness, paresthesias, seizures, sensory change, tremors and weakness.   Psychiatric/Behavioral: Negative for altered mental status, depression, memory loss and substance abuse. The patient is not nervous/anxious.    Allergic/Immunologic: Negative for hives and persistent infections.        Objective:      Vitals:    09/30/19 0917   BP: 129/82   Pulse: 63   Weight: 60.8 kg (134 lb 0.6 oz)   Height: 5' 6" (1.676 m)   PainSc: 0-No pain     Body mass index is 21.63 kg/m².    Physical Exam "   Constitutional: She is oriented to person, place, and time. She appears well-developed and well-nourished. She is active.   HENT:   Head: Normocephalic and atraumatic.   Mouth/Throat: Oropharynx is clear and moist and mucous membranes are normal.   Eyes: Pupils are equal, round, and reactive to light. Conjunctivae and EOM are normal.   Neck: Trachea normal and normal range of motion. Neck supple. Normal carotid pulses, no hepatojugular reflux and no JVD present. Carotid bruit is not present. No tracheal deviation, no edema and no erythema present. No thyromegaly present.   Cardiovascular: Normal rate and regular rhythm.  No extrasystoles are present. PMI is not displaced. Exam reveals no gallop and no friction rub.   Murmur heard.   Systolic murmur is present with a grade of 1/6 at the lower left sternal border.  Pulses:       Carotid pulses are 2+ on the right side, and 2+ on the left side.       Radial pulses are 2+ on the right side, and 2+ on the left side.        Femoral pulses are 2+ on the right side, and 2+ on the left side.       Dorsalis pedis pulses are 2+ on the right side, and 2+ on the left side.        Posterior tibial pulses are 2+ on the right side, and 2+ on the left side.   Pulmonary/Chest: Effort normal and breath sounds normal. No tachypnea and no bradypnea. She has no wheezes. She has no rales. She exhibits no tenderness.   Abdominal: Soft. Bowel sounds are normal. She exhibits no distension and no mass. There is no hepatosplenomegaly. There is no tenderness. There is no guarding and no CVA tenderness.   Musculoskeletal: Normal range of motion. She exhibits no edema or tenderness.   Lymphadenopathy:     She has no cervical adenopathy.   Neurological: She is alert and oriented to person, place, and time. She has normal strength. She displays no tremor. No cranial nerve deficit. She exhibits normal muscle tone. Coordination normal.   Skin: Skin is warm and dry. No rash noted. No cyanosis or  erythema. No pallor.   Psychiatric: She has a normal mood and affect. Her speech is normal and behavior is normal. Judgment and thought content normal.               ..    Chemistry        Component Value Date/Time     05/15/2018 2045    K 3.5 05/15/2018 2045    CL 99 05/15/2018 2045    CO2 28 05/15/2018 2045    BUN 17 05/15/2018 2045    CREATININE 0.90 05/15/2018 2045     05/15/2018 2045        Component Value Date/Time    CALCIUM 9.8 05/15/2018 2045    ALKPHOS 84 05/15/2018 2045    AST 34 05/15/2018 2045    ALT 37 05/15/2018 2045    BILITOT 1.3 05/15/2018 2045    ESTGFRAFRICA >60 05/15/2018 2045    EGFRNONAA >60 05/15/2018 2045            ..  Lab Results   Component Value Date    CHOL 286 (A) 05/31/2019    CHOL 212 (H) 09/15/2017    CHOL 234 (H) 06/09/2017     Lab Results   Component Value Date    HDL 89 05/31/2019    HDL 68 09/15/2017    HDL 75 06/09/2017     Lab Results   Component Value Date    LDLCALC 181 (A) 05/31/2019    LDLCALC 135.0 09/15/2017    LDLCALC 144.4 06/09/2017     Lab Results   Component Value Date    TRIG 64 08/30/2019    TRIG 82 05/31/2019    TRIG 45 09/15/2017     Lab Results   Component Value Date    CHOLHDL 32.1 09/15/2017    CHOLHDL 32.1 06/09/2017    CHOLHDL 27.6 12/09/2016     ..  Lab Results   Component Value Date    WBC 14.74 (H) 05/15/2018    HGB 14.3 05/15/2018    HCT 42.0 05/15/2018    MCV 91 05/15/2018     05/15/2018       Test(s) Reviewed  I have reviewed the following in detail:  [] Stress test   [] Angiography   [] Echocardiogram   [] Labs   [] Other:       Assessment:         ICD-10-CM ICD-9-CM   1. Essential hypertension I10 401.9   2. Hypercholesterolemia E78.00 272.0   3. Undiagnosed cardiac murmurs R01.1 785.2   4. Bradycardia R00.1 427.89   5. At risk for coronary artery disease Z91.89 V49.89     Problem List Items Addressed This Visit        Cardiac/Vascular    Essential hypertension - Primary    Overview     Initial HPI:  Chronic.  Stable.  Well  controlled.  Has been present for many years.  Taking hydrochlorothiazide.  Reports compliance.  No side effects reported.  Denies any chest pain shortness of breath blurred vision.  Patient does have a strong family history of heart attacks and strokes in the family.    09/10/2019  Patient is compliant with hydrochlorothiazide 25 mg.  Patient has been participating in the digital medicine hypertension program.  Blood pressure is low normal on most occasions.  Patient does have lower extremity edema occasionally which is resolved with the hydrochlorothiazide.  Denies any side effects.  Denies any chest pain shortness of breath blurred vision.  Dizziness or lightheadedness.         Relevant Orders    CV Ultrasound Bilateral Doppler Carotid    Stress Echo Which stress agent will be used? Treadmill Exercise; Color Flow Doppler? No    Stress Echo Which stress agent will be used? Treadmill Exercise; Color Flow Doppler? Yes    Hypercholesterolemia    Overview     Lab Results   Component Value Date    CHOL 212 (H) 09/15/2017    CHOL 234 (H) 06/09/2017    CHOL 275 (H) 12/09/2016     Lab Results   Component Value Date    HDL 68 09/15/2017    HDL 75 06/09/2017    HDL 76 (H) 12/09/2016     Lab Results   Component Value Date    LDLCALC 135.0 09/15/2017    LDLCALC 144.4 06/09/2017    LDLCALC 184.0 (H) 12/09/2016     Lab Results   Component Value Date    TRIG 45 09/15/2017    TRIG 73 06/09/2017    TRIG 75 12/09/2016     Lab Results   Component Value Date    CHOLHDL 32.1 09/15/2017    CHOLHDL 32.1 06/09/2017    CHOLHDL 27.6 12/09/2016     Chronic.  Untreated.  Only using diet and exercise.  The most recent lipid panel was done by her OBGYN on May 31, 2019.  Showing total cholesterol is still elevated 286.  Triglycerides are normal at 82 HDL is therapeutic at 89 LDL is still high at 181.  Patient has strong family history of heart attacks in her family.  Thyroid studies were normal.  Testosterone is within normal limits.  Blood  count and metabolic panel within normal limits  ==========================  Orders Only on 08/30/2019   Component Date Value Ref Range Status    LDL-P 08/30/2019 2,543* <1,000 nmol/L Final    Comment:                           Low                   < 1000                            Moderate         1000 - 1299                            Borderline-High  1300 - 1599                            High             1600 - 2000                            Very High             > 2000      LDL-C 08/30/2019 260* 0 - 99 mg/dL Final    Comment:                           Optimal               <  100                            Above optimal     100 -  129                            Borderline        130 -  159                            High              160 -  189                            Very high             >  189  LDL-C is inaccurate if patient is non-fasting.      HDL-C 08/30/2019 94  >39 mg/dL Final    Triglycerides 08/30/2019 64  0 - 149 mg/dL Final    Cholesterol, Total 08/30/2019 367* 100 - 199 mg/dL Final    HDL-P (Total) 08/30/2019 44.0  >=30.5 umol/L Final    Small LDL-P 08/30/2019 220  <=527 nmol/L Final    LDL Size 08/30/2019 22.1  >20.5 nm Final    Comment:  ----------------------------------------------------------                   ** INTERPRETATIVE INFORMATION**                   PARTICLE CONCENTRATION AND SIZE                      <--Lower CVD Risk   Higher CVD Risk-->    LDL AND HDL PARTICLES   Percentile in Reference Population    HDL-P (total)        High     75th    50th    25th   Low                         >34.9    34.9    30.5    26.7   <26.7    Small LDL-P          Low      25th    50th    75th   High                         <117     117     527     839    >839    LDL Size   <-Large (Pattern A)->    <-Small (Pattern B)->                      23.0    20.6           20.5      19.0   ----------------------------------------------------------  Small LDL-P and LDL Size are associated with CVD risk,  but not after  LDL-P is taken into account.  These assays were developed and their performance characteristics  determined by [employer]. These assays have not been cleared by the  [place] Food and Drug Administration. The clinical utility o                           f these  laboratory values have not been fully established.      LP-IR Score 08/30/2019 <25  <=45 Final    Comment: INSULIN RESISTANCE MARKER      <--Insulin Sensitive    Insulin Resistant-->             Percentile in Reference Population  Insulin Resistance Score  LP-IR Score   Low   25th   50th   75th   High                <27   27     45     63     >63  LP-IR Score is inaccurate if patient is non-fasting.  The LP-IR score is a laboratory developed index that has been  associated with insulin resistance and diabetes risk and should be  used as one component of a physician's clinical assessment. The  LP-IR score listed above has not been cleared by the [place] Food and  Drug Administration.       Patient does not want to take a statin at this time.  But is willing to take Zetia and fish oil and niacin.  Patient is concerned about her strong family history of heart attacks and strokes.  Patient denies any symptoms of chest pain shortness of breath blurry vision dizziness lightheadedness abdominal pain etc.         Relevant Orders    CV Ultrasound Bilateral Doppler Carotid    Stress Echo Which stress agent will be used? Treadmill Exercise; Color Flow Doppler? No    Stress Echo Which stress agent will be used? Treadmill Exercise; Color Flow Doppler? Yes    Undiagnosed cardiac murmurs    Relevant Orders    CV Ultrasound Bilateral Doppler Carotid    Stress Echo Which stress agent will be used? Treadmill Exercise; Color Flow Doppler? No    Stress Echo Which stress agent will be used? Treadmill Exercise; Color Flow Doppler? Yes    Bradycardia    Relevant Orders    CV Ultrasound Bilateral Doppler Carotid    Stress Echo Which stress agent will be used? Treadmill  Exercise; Color Flow Doppler? No    Stress Echo Which stress agent will be used? Treadmill Exercise; Color Flow Doppler? Yes      Other Visit Diagnoses     At risk for coronary artery disease        Relevant Orders    CV Ultrasound Bilateral Doppler Carotid    CT Cardiac Scoring    Stress Echo Which stress agent will be used? Treadmill Exercise; Color Flow Doppler? No    Stress Echo Which stress agent will be used? Treadmill Exercise; Color Flow Doppler? Yes           Plan:         EKG SB AT 51, WILL NEED EVALUATION, CHECK TSH, ECHO, CAROTIDS, CA SCORE, CHANGE HCTZ TO QOD, WATCH BLOOD PRESSURE,ALL OTHER CV CLINICALLY STABLE, NO ANGINA, NO HF, NO TIA, NO CLINICAL ARRHYTHMIA,CONTINUE CURRENT MEDS, EDUCATION, DIET, EXERCISE, RETURN TO CLINIC IN 1-2 MONTHS  Essential hypertension  -     CV Ultrasound Bilateral Doppler Carotid; Future  -     Stress Echo Which stress agent will be used? Treadmill Exercise; Color Flow Doppler? No; Future  -     Stress Echo Which stress agent will be used? Treadmill Exercise; Color Flow Doppler? Yes; Future  -     SCHEDULED EKG 12-LEAD (to Muse)    Hypercholesterolemia  -     CV Ultrasound Bilateral Doppler Carotid; Future  -     Stress Echo Which stress agent will be used? Treadmill Exercise; Color Flow Doppler? No; Future  -     Stress Echo Which stress agent will be used? Treadmill Exercise; Color Flow Doppler? Yes; Future    Undiagnosed cardiac murmurs  -     CV Ultrasound Bilateral Doppler Carotid; Future  -     Stress Echo Which stress agent will be used? Treadmill Exercise; Color Flow Doppler? No; Future  -     Stress Echo Which stress agent will be used? Treadmill Exercise; Color Flow Doppler? Yes; Future    Bradycardia  -     CV Ultrasound Bilateral Doppler Carotid; Future  -     Stress Echo Which stress agent will be used? Treadmill Exercise; Color Flow Doppler? No; Future  -     Stress Echo Which stress agent will be used? Treadmill Exercise; Color Flow Doppler? Yes; Future    At  risk for coronary artery disease  -     CV Ultrasound Bilateral Doppler Carotid; Future  -     CT Cardiac Scoring; Future; Expected date: 09/30/2019  -     Stress Echo Which stress agent will be used? Treadmill Exercise; Color Flow Doppler? No; Future  -     Stress Echo Which stress agent will be used? Treadmill Exercise; Color Flow Doppler? Yes; Future    Other orders  -     hydroCHLOROthiazide (HYDRODIURIL) 25 MG tablet; Take 0.5 tablets (12.5 mg total) by mouth every other day.  Dispense: 15 tablet; Refill: 1    RTC Low level/low impact aerobic exercise 5x's/wk. Heart healthy diet and risk factor modification.    See labs and med orders.    Aerobic exercise 5x's/wk. Heart healthy diet and risk factor modification.    See labs and med orders.

## 2019-10-01 ENCOUNTER — ANESTHESIA EVENT (OUTPATIENT)
Dept: SURGERY | Facility: HOSPITAL | Age: 54
End: 2019-10-01
Payer: COMMERCIAL

## 2019-10-01 DIAGNOSIS — I10 ESSENTIAL HYPERTENSION: Primary | ICD-10-CM

## 2019-10-01 DIAGNOSIS — R31.29 MICROSCOPIC HEMATURIA: Primary | ICD-10-CM

## 2019-10-02 ENCOUNTER — ANESTHESIA (OUTPATIENT)
Dept: SURGERY | Facility: HOSPITAL | Age: 54
End: 2019-10-02
Payer: COMMERCIAL

## 2019-10-02 ENCOUNTER — HOSPITAL ENCOUNTER (OUTPATIENT)
Facility: HOSPITAL | Age: 54
Discharge: HOME | End: 2019-10-02
Attending: UROLOGY | Admitting: UROLOGY
Payer: COMMERCIAL

## 2019-10-02 ENCOUNTER — HOSPITAL ENCOUNTER (OUTPATIENT)
Dept: RADIOLOGY | Facility: HOSPITAL | Age: 54
Discharge: HOME | End: 2019-10-02
Attending: UROLOGY | Admitting: UROLOGY
Payer: COMMERCIAL

## 2019-10-02 VITALS
DIASTOLIC BLOOD PRESSURE: 80 MMHG | HEART RATE: 60 BPM | RESPIRATION RATE: 18 BRPM | OXYGEN SATURATION: 100 % | TEMPERATURE: 97 F | HEIGHT: 66 IN | WEIGHT: 134.06 LBS | BODY MASS INDEX: 21.55 KG/M2 | SYSTOLIC BLOOD PRESSURE: 124 MMHG

## 2019-10-02 DIAGNOSIS — R31.21 ASYMPTOMATIC MICROSCOPIC HEMATURIA: Primary | ICD-10-CM

## 2019-10-02 DIAGNOSIS — R31.29 MICROSCOPIC HEMATURIA: ICD-10-CM

## 2019-10-02 PROCEDURE — 63600175 PHARM REV CODE 636 W HCPCS: Mod: PO | Performed by: ANESTHESIOLOGY

## 2019-10-02 PROCEDURE — 63600175 PHARM REV CODE 636 W HCPCS: Mod: PO | Performed by: NURSE ANESTHETIST, CERTIFIED REGISTERED

## 2019-10-02 PROCEDURE — D9220A PRA ANESTHESIA: Mod: CRNA,,, | Performed by: NURSE ANESTHETIST, CERTIFIED REGISTERED

## 2019-10-02 PROCEDURE — 71000015 HC POSTOP RECOV 1ST HR: Mod: PO | Performed by: UROLOGY

## 2019-10-02 PROCEDURE — 36000706: Mod: PO | Performed by: UROLOGY

## 2019-10-02 PROCEDURE — 76000 FLUOROSCOPY <1 HR PHYS/QHP: CPT | Mod: TC,PO

## 2019-10-02 PROCEDURE — 36000707: Mod: PO | Performed by: UROLOGY

## 2019-10-02 PROCEDURE — D9220A PRA ANESTHESIA: ICD-10-PCS | Mod: CRNA,,, | Performed by: NURSE ANESTHETIST, CERTIFIED REGISTERED

## 2019-10-02 PROCEDURE — 71000033 HC RECOVERY, INTIAL HOUR: Mod: PO | Performed by: UROLOGY

## 2019-10-02 PROCEDURE — D9220A PRA ANESTHESIA: Mod: ANES,,, | Performed by: ANESTHESIOLOGY

## 2019-10-02 PROCEDURE — 37000008 HC ANESTHESIA 1ST 15 MINUTES: Mod: PO | Performed by: UROLOGY

## 2019-10-02 PROCEDURE — 25500020 PHARM REV CODE 255: Mod: PO | Performed by: UROLOGY

## 2019-10-02 PROCEDURE — C1769 GUIDE WIRE: HCPCS | Mod: PO | Performed by: UROLOGY

## 2019-10-02 PROCEDURE — D9220A PRA ANESTHESIA: ICD-10-PCS | Mod: ANES,,, | Performed by: ANESTHESIOLOGY

## 2019-10-02 PROCEDURE — 37000009 HC ANESTHESIA EA ADD 15 MINS: Mod: PO | Performed by: UROLOGY

## 2019-10-02 RX ORDER — FENTANYL CITRATE 50 UG/ML
25 INJECTION, SOLUTION INTRAMUSCULAR; INTRAVENOUS EVERY 5 MIN PRN
Status: DISCONTINUED | OUTPATIENT
Start: 2019-10-02 | End: 2019-10-02 | Stop reason: HOSPADM

## 2019-10-02 RX ORDER — CEFAZOLIN SODIUM 1 G/3ML
INJECTION, POWDER, FOR SOLUTION INTRAMUSCULAR; INTRAVENOUS
Status: DISCONTINUED | OUTPATIENT
Start: 2019-10-02 | End: 2019-10-02

## 2019-10-02 RX ORDER — PROPOFOL 10 MG/ML
VIAL (ML) INTRAVENOUS
Status: DISCONTINUED | OUTPATIENT
Start: 2019-10-02 | End: 2019-10-02

## 2019-10-02 RX ORDER — HYDROMORPHONE HYDROCHLORIDE 2 MG/ML
0.2 INJECTION, SOLUTION INTRAMUSCULAR; INTRAVENOUS; SUBCUTANEOUS EVERY 5 MIN PRN
Status: DISCONTINUED | OUTPATIENT
Start: 2019-10-02 | End: 2019-10-02 | Stop reason: HOSPADM

## 2019-10-02 RX ORDER — FENTANYL CITRATE 50 UG/ML
INJECTION, SOLUTION INTRAMUSCULAR; INTRAVENOUS
Status: DISCONTINUED | OUTPATIENT
Start: 2019-10-02 | End: 2019-10-02

## 2019-10-02 RX ORDER — PROPOFOL 10 MG/ML
VIAL (ML) INTRAVENOUS CONTINUOUS PRN
Status: DISCONTINUED | OUTPATIENT
Start: 2019-10-02 | End: 2019-10-02

## 2019-10-02 RX ORDER — MIDAZOLAM HYDROCHLORIDE 1 MG/ML
INJECTION, SOLUTION INTRAMUSCULAR; INTRAVENOUS
Status: DISCONTINUED | OUTPATIENT
Start: 2019-10-02 | End: 2019-10-02

## 2019-10-02 RX ORDER — OXYCODONE HYDROCHLORIDE 5 MG/1
5 TABLET ORAL
Status: DISCONTINUED | OUTPATIENT
Start: 2019-10-02 | End: 2019-10-02 | Stop reason: HOSPADM

## 2019-10-02 RX ORDER — LIDOCAINE HCL/PF 100 MG/5ML
SYRINGE (ML) INTRAVENOUS
Status: DISCONTINUED | OUTPATIENT
Start: 2019-10-02 | End: 2019-10-02

## 2019-10-02 RX ORDER — ONDANSETRON 2 MG/ML
INJECTION INTRAMUSCULAR; INTRAVENOUS
Status: DISCONTINUED | OUTPATIENT
Start: 2019-10-02 | End: 2019-10-02

## 2019-10-02 RX ORDER — KETOROLAC TROMETHAMINE 30 MG/ML
INJECTION, SOLUTION INTRAMUSCULAR; INTRAVENOUS
Status: DISCONTINUED | OUTPATIENT
Start: 2019-10-02 | End: 2019-10-02

## 2019-10-02 RX ORDER — HYDROCODONE BITARTRATE AND ACETAMINOPHEN 5; 325 MG/1; MG/1
1 TABLET ORAL EVERY 4 HOURS PRN
Status: DISCONTINUED | OUTPATIENT
Start: 2019-10-02 | End: 2019-10-02 | Stop reason: HOSPADM

## 2019-10-02 RX ORDER — ACETAMINOPHEN 325 MG/1
650 TABLET ORAL EVERY 4 HOURS PRN
Status: DISCONTINUED | OUTPATIENT
Start: 2019-10-02 | End: 2019-10-02 | Stop reason: HOSPADM

## 2019-10-02 RX ORDER — HYDROCODONE BITARTRATE AND ACETAMINOPHEN 10; 325 MG/1; MG/1
1 TABLET ORAL EVERY 4 HOURS PRN
Status: DISCONTINUED | OUTPATIENT
Start: 2019-10-02 | End: 2019-10-02 | Stop reason: HOSPADM

## 2019-10-02 RX ORDER — LIDOCAINE HYDROCHLORIDE 10 MG/ML
1 INJECTION, SOLUTION EPIDURAL; INFILTRATION; INTRACAUDAL; PERINEURAL ONCE
Status: DISCONTINUED | OUTPATIENT
Start: 2019-10-02 | End: 2019-10-02 | Stop reason: HOSPADM

## 2019-10-02 RX ORDER — SODIUM CHLORIDE, SODIUM LACTATE, POTASSIUM CHLORIDE, CALCIUM CHLORIDE 600; 310; 30; 20 MG/100ML; MG/100ML; MG/100ML; MG/100ML
INJECTION, SOLUTION INTRAVENOUS CONTINUOUS
Status: DISCONTINUED | OUTPATIENT
Start: 2019-10-02 | End: 2019-10-02 | Stop reason: HOSPADM

## 2019-10-02 RX ADMIN — FENTANYL CITRATE 50 MCG: 50 INJECTION, SOLUTION INTRAMUSCULAR; INTRAVENOUS at 12:10

## 2019-10-02 RX ADMIN — KETOROLAC TROMETHAMINE 30 MG: 30 INJECTION, SOLUTION INTRAMUSCULAR; INTRAVENOUS at 12:10

## 2019-10-02 RX ADMIN — CEFAZOLIN 2 G: 1 INJECTION, POWDER, FOR SOLUTION INTRAVENOUS at 12:10

## 2019-10-02 RX ADMIN — LIDOCAINE HYDROCHLORIDE 75 MG: 20 INJECTION PARENTERAL at 12:10

## 2019-10-02 RX ADMIN — SODIUM CHLORIDE, SODIUM LACTATE, POTASSIUM CHLORIDE, AND CALCIUM CHLORIDE: .6; .31; .03; .02 INJECTION, SOLUTION INTRAVENOUS at 11:10

## 2019-10-02 RX ADMIN — ONDANSETRON 4 MG: 2 INJECTION, SOLUTION INTRAMUSCULAR; INTRAVENOUS at 12:10

## 2019-10-02 RX ADMIN — PROPOFOL 75 MCG/KG/MIN: 10 INJECTION, EMULSION INTRAVENOUS at 12:10

## 2019-10-02 RX ADMIN — MIDAZOLAM HYDROCHLORIDE 2 MG: 1 INJECTION, SOLUTION INTRAMUSCULAR; INTRAVENOUS at 12:10

## 2019-10-02 RX ADMIN — PROPOFOL 100 MG: 10 INJECTION, EMULSION INTRAVENOUS at 12:10

## 2019-10-02 NOTE — H&P
[facility]  Urology  History & Physical    Patient Name: Viridiana Ross  MRN: [id number]  Admission Date: 10/2/2019  Code Status: No Order   Attending Provider: Fred Aranda MD   Primary Care Physician: Ranjeet Coleman MD  Principal Problem:<principal problem not specified>    Subjective:     HPI: Microhematuria    Past Medical History:   Diagnosis Date    Acne     Diverticulosis     Encounter for blood transfusion     Hyperlipidemia     Hypertension     IBS (irritable bowel syndrome)        Past Surgical History:   Procedure Laterality Date     SECTION      X3    EYE SURGERY      HYSTERECTOMY      TUBAL LIGATION  1994       Review of patient's allergies indicates:  No Known Allergies    Family History     Problem Relation (Age of Onset)    Alzheimer's disease Mother    Cancer Mother, Brother, Brother, Brother    Depression Mother    Diabetes Mother    Diverticulitis Mother, Sister    Hearing loss Mother, Father    Heart disease Mother, Father    Hyperlipidemia Mother, Father    Hypertension Mother, Father    Stroke Father          Tobacco Use    Smoking status: Never Smoker    Smokeless tobacco: Never Used   Substance and Sexual Activity    Alcohol use: Yes     Alcohol/week: 3.0 standard drinks     Types: 1 Glasses of wine, 2 Standard drinks or equivalent per week     Frequency: Never     Comment: daily    Drug use: Never    Sexual activity: Yes     Partners: Male     Birth control/protection: Post-menopausal       Review of Systems   Constitutional: Negative.    HENT: Negative.    Eyes: Negative.    Respiratory: Negative.    Cardiovascular: Negative.    Gastrointestinal: Negative.    Genitourinary: Negative.    Musculoskeletal: Negative.    Skin: Negative.    Neurological: Negative.    Psychiatric/Behavioral: Negative.        Objective:     Temp:  [97.7 °F (36.5 °C)] 97.7 °F (36.5 °C)  Pulse:  [60] 60  Resp:  [16] 16  SpO2:  [100 %] 100 %  BP: (133)/(80)  133/80     Body mass index is 21.63 kg/m².    No intake/output data recorded.       Lines/Drains/Airways     Peripheral Intravenous Line                 Peripheral IV - Single Lumen 10/02/19 1120 20 G Right Hand less than 1 day                Physical Exam   Nursing note and vitals reviewed.  Constitutional: She is oriented to person, place, and time. She appears well-developed and well-nourished.   HENT:   Head: Normocephalic and atraumatic.   Eyes: EOM are normal. Pupils are equal, round, and reactive to light.   Neck: Normal range of motion. Neck supple.   Cardiovascular: Normal rate.    Pulmonary/Chest: Effort normal.   Abdominal: Soft.   Genitourinary: Vagina normal.   Neurological: She is alert and oriented to person, place, and time.   Skin: Skin is warm and dry.     Psychiatric: She has a normal mood and affect. Her behavior is normal. Judgment and thought content normal.       Significant Labs:  BMP:  No results for input(s): NA, K, CL, CO2, BUN, CREATININE, LABGLOM, GLUCOSE, CALCIUM in the last 168 hours.    CBC:  No results for input(s): WBC, HGB, HCT, PLT in the last 168 hours.    Recent Lab Results     None          Significant Imaging:  All pertinent imaging results/findings from the past 24 hours have been reviewed.    Assessment and Plan:     There are no hospital problems to display for this patient.      VTE Risk Mitigation (From admission, onward)    None        Cystoscopy RPG    Fred Aranda MD  Urology  [facility]

## 2019-10-02 NOTE — DISCHARGE INSTRUCTIONS
Discharge Instructions: After Your Surgery  Youve just had surgery. During surgery, you were given medicine called anesthesia to keep you relaxed and free of pain. After surgery, you may have some pain or nausea. This is common. Here are some tips for feeling better and getting well after surgery.     Stay on schedule with your medicine.   Going home  Your healthcare provider will show you how to take care of yourself when you go home. He or she will also answer your questions. Have an adult family member or friend drive you home. For the first 24 hours after your surgery:  · Do not drive or use heavy equipment.  · Do not make important decisions or sign legal papers.  · Do not drink alcohol.  · Have someone stay with you, if needed. He or she can watch for problems and help keep you safe.  Be sure to go to all follow-up visits with your healthcare provider. And rest after your surgery for as long as your healthcare provider tells you to.  Coping with pain  If you have pain after surgery, pain medicine will help you feel better. Take it as told, before pain becomes severe. Also, ask your healthcare provider or pharmacist about other ways to control pain. This might be with heat, ice, or relaxation. And follow any other instructions your surgeon or nurse gives you.  Tips for taking pain medicine  To get the best relief possible, remember these points:  · Pain medicines can upset your stomach. Taking them with a little food may help.  · Most pain relievers taken by mouth need at least 20 to 30 minutes to start to work.  · Taking medicine on a schedule can help you remember to take it. Try to time your medicine so that you can take it before starting an activity. This might be before you get dressed, go for a walk, or sit down for dinner.  · Constipation is a common side effect of pain medicines. Call your healthcare provider before taking any medicines such as laxatives or stool softeners to help ease constipation.  Also ask if you should skip any foods. Drinking lots of fluids and eating foods such as fruits and vegetables that are high in fiber can also help. Remember, do not take laxatives unless your surgeon has prescribed them.  · Drinking alcohol and taking pain medicine can cause dizziness and slow your breathing. It can even be deadly. Do not drink alcohol while taking pain medicine.  · Pain medicine can make you react more slowly to things. Do not drive or run machinery while taking pain medicine.  Your healthcare provider may tell you to take acetaminophen to help ease your pain. Ask him or her how much you are supposed to take each day. Acetaminophen or other pain relievers may interact with your prescription medicines or other over-the-counter (OTC) medicines. Some prescription medicines have acetaminophen and other ingredients. Using both prescription and OTC acetaminophen for pain can cause you to overdose. Read the labels on your OTC medicines with care. This will help you to clearly know the list of ingredients, how much to take, and any warnings. It may also help you not take too much acetaminophen. If you have questions or do not understand the information, ask your pharmacist or healthcare provider to explain it to you before you take the OTC medicine.  Managing nausea  Some people have an upset stomach after surgery. This is often because of anesthesia, pain, or pain medicine, or the stress of surgery. These tips will help you handle nausea and eat healthy foods as you get better. If you were on a special food plan before surgery, ask your healthcare provider if you should follow it while you get better. These tips may help:  · Do not push yourself to eat. Your body will tell you when to eat and how much.  · Start off with clear liquids and soup. They are easier to digest.  · Next try semi-solid foods, such as mashed potatoes, applesauce, and gelatin, as you feel ready.  · Slowly move to solid foods. Dont  eat fatty, rich, or spicy foods at first.  · Do not force yourself to have 3 large meals a day. Instead eat smaller amounts more often.  · Take pain medicines with a small amount of solid food, such as crackers or toast, to avoid nausea.     Call your surgeon if  · You still have pain an hour after taking medicine. The medicine may not be strong enough.  · You feel too sleepy, dizzy, or groggy. The medicine may be too strong.  · You have side effects like nausea, vomiting, or skin changes, such as rash, itching, or hives.       If you have obstructive sleep apnea  You were given anesthesia medicine during surgery to keep you comfortable and free of pain. After surgery, you may have more apnea spells because of this medicine and other medicines you were given. The spells may last longer than usual.   At home:  · Keep using the continuous positive airway pressure (CPAP) device when you sleep. Unless your healthcare provider tells you not to, use it when you sleep, day or night. CPAP is a common device used to treat obstructive sleep apnea.  · Talk with your provider before taking any pain medicine, muscle relaxants, or sedatives. Your provider will tell you about the possible dangers of taking these medicines.  Date Last Reviewed: 12/1/2016 © 2000-2017 [employer]. [address] [place] [address], [place] [address]. All rights reserved. This information is not intended as a substitute for professional medical care. Always follow your healthcare professional's instructions.    .CYSTOSCOPE    DO'S   Minimal activity for first 24 hours.   Eat light meals for the first 24 hours.   Drinks lots of fluids for 7 days.   Burning and blood tinged urine is normal day of procedure.     DON'T   No driving for next 24 hours or while taking narcotic medication.   No tub baths, shower only for 7 days.  DO NOT TAKE ANY ADDITIONAL TYLENOL/ACETAMINOPHEN WHILE TAKING NARCOTIC PAIN MEDICATION.      CALL PHYSICIAN FOR:   Unable to  urinate within 6 hours after procedure.   Fever greater than 101.   Persistent pain not relieved by pain meds.   Blood in urine with clots lasting greater than 24 hours.    RETURN APPOINTMENT AS INSTRUCTED  CALL [phone] for doctor.

## 2019-10-02 NOTE — ANESTHESIA POSTPROCEDURE EVALUATION
Anesthesia Post Evaluation    Patient: Viridiana Ross    Procedure(s) Performed: Procedure(s) (LRB):  CYSTOSCOPY, WITH RETROGRADE PYELOGRAM (N/A)    Final Anesthesia Type: MAC  Patient location during evaluation: PACU  Patient participation: Yes- Able to Participate  Level of consciousness: awake and alert, oriented and awake  Post-procedure vital signs: reviewed and stable  Pain management: adequate  Airway patency: patent  PONV status at discharge: No PONV  Anesthetic complications: no      Cardiovascular status: blood pressure returned to baseline and hemodynamically stable  Respiratory status: unassisted, spontaneous ventilation and room air  Hydration status: euvolemic  Follow-up not needed.          Vitals Value Taken Time   /84 10/2/2019  1:05 PM   Temp 36.4 °C (97.5 °F) 10/2/2019 12:55 PM   Pulse 59 10/2/2019  1:05 PM   Resp 18 10/2/2019  1:05 PM   SpO2 100 % 10/2/2019  1:05 PM         No case tracking events are documented in the log.      Pain/Raven Score: Arven Score: 10 (10/2/2019  1:05 PM)

## 2019-10-02 NOTE — TRANSFER OF CARE
"Anesthesia Transfer of Care Note    Patient: Viridiana Ross    Procedure(s) Performed: Procedure(s) (LRB):  CYSTOSCOPY, WITH RETROGRADE PYELOGRAM (N/A)    Patient location: PACU    Anesthesia Type: MAC    Transport from OR: Transported from OR on room air with adequate spontaneous ventilation    Post pain: adequate analgesia    Post assessment: no apparent anesthetic complications    Post vital signs: stable    Level of consciousness: awake    Nausea/Vomiting: no nausea/vomiting    Complications: none    Transfer of care protocol was followed      Last vitals:   Visit Vitals  /80 (BP Location: Right arm, Patient Position: Lying)   Pulse 60   Temp 36.5 °C (97.7 °F) (Skin)   Resp 16   Ht 5' 6" (1.676 m)   Wt 60.8 kg (134 lb 0.6 oz)   SpO2 100%   Breastfeeding? No   BMI 21.63 kg/m²     "

## 2019-10-02 NOTE — BRIEF OP NOTE
[facility]  Brief Operative Note     SUMMARY     Surgery Date: 10/2/2019     Surgeon(s) and Role:     * Fred Aranda MD - Primary    Assisting Surgeon: None    Pre-op Diagnosis:  Microscopic hematuria [R31.29]    Post-op Diagnosis:  Post-Op Diagnosis Codes:     * Microscopic hematuria [R31.29]    Procedure(s) (LRB):  CYSTOSCOPY, WITH RETROGRADE PYELOGRAM (N/A)    Anesthesia: Choice    Description of the findings of the procedure: Dictated    Findings/Key Components: Dictated    Estimated Blood Loss: * No values recorded between 10/2/2019 12:41 PM and 10/2/2019 12:52 PM *         Specimens:   Specimen (12h ago, onward)    None          Discharge Note    SUMMARY     Admit Date: 10/2/2019    Discharge Date and Time:  10/02/2019 12:52 PM    Hospital Course (synopsis of major diagnoses, care, treatment, and services provided during the course of the hospital stay): Doing well without immediate complications.     Final Diagnosis: Post-Op Diagnosis Codes:     * Microscopic hematuria [R31.29]    Disposition: Home or Self Care    Follow Up/Patient Instructions:     Medications:  Reconciled Home Medications:      Medication List      CONTINUE taking these medications    ACANYA 1.2-2.5 % Glwp  Generic drug:  clindamycin-benzoyl peroxide  1 g by Other route once daily. Apply 1 g to face daily     calcium carbonate 600 mg calcium (1,500 mg) Tab  Commonly known as:  OS-BENITA  Take 600 mg by mouth once daily.     dicyclomine 10 MG capsule  Commonly known as:  BENTYL  daily as needed.     estradiol 0.05 mg/24 hr td ptsw 0.05 mg/24 hr  Commonly known as:  VIVELLE-DOT  Place 1 patch onto the skin twice a week.     ezetimibe 10 mg tablet  Commonly known as:  ZETIA  Take 1 tablet (10 mg total) by mouth once daily.     hydroCHLOROthiazide 25 MG tablet  Commonly known as:  HYDRODIURIL  Take 0.5 tablets (12.5 mg total) by mouth every other day.     Saccharomyces boulardii 250 mg capsule  Commonly known as:   FLORASTOR  Take 250 mg by mouth once daily.     TESTIM 50 mg/5 gram (1 %) Gel  Generic drug:  testosterone  Apply topically once daily.          Discharge Procedure Orders   Diet general     Call MD for:  temperature >100.4     Call MD for:  persistent nausea and vomiting     Call MD for:  severe uncontrolled pain     Call MD for:  difficulty breathing, headache or visual disturbances     No dressing needed     Activity as tolerated     Follow-up Information     Fred Aranda MD In 6 months.    Specialty:  Urology  Contact information:  [address] [place] [address] [phone]

## 2019-10-02 NOTE — ANESTHESIA PREPROCEDURE EVALUATION
10/02/2019  Viridiana Ross is a 54 y.o., female.    Anesthesia Evaluation    I have reviewed the Patient Summary Reports.    I have reviewed the Nursing Notes.      Review of Systems  Anesthesia Hx:  No problems with previous Anesthesia    Cardiovascular:   Hypertension        Physical Exam  General:  Well nourished    Airway/Jaw/Neck:  Airway Findings: Mouth Opening: Normal Tongue: Normal  TM Distance: Normal, at least 6 cm  Jaw/Neck Findings:  Neck ROM: Normal ROM     Eyes/Ears/Nose:  Eyes/Ears/Nose Findings:    Dental:  Dental Findings: In tact   Chest/Lungs:  Chest/Lungs Findings: Normal Respiratory Rate     Heart/Vascular:  Heart Findings: Rate: Normal  Rhythm: Regular Rhythm        Mental Status:  Mental Status Findings:  Cooperative, Alert and Oriented         Anesthesia Plan  Type of Anesthesia, risks & benefits discussed:  Anesthesia Type:  MAC  Patient's Preference: MAC  Intra-op Monitoring Plan: standard ASA monitors  Intra-op Monitoring Plan Comments:   Post Op Pain Control Plan: per primary service following discharge from PACU  Post Op Pain Control Plan Comments:   Induction:    Beta Blocker:         Informed Consent: Patient understands risks and agrees with Anesthesia plan.  Questions answered. Anesthesia consent signed with patient.  ASA Score: 2     Day of Surgery Review of History & Physical:    H&P update referred to the surgeon.         Ready For Surgery From Anesthesia Perspective.

## 2019-10-03 NOTE — OP NOTE
DATE OF PROCEDURE:  10/02/2019    LOCATION:  This is an [facility] OP note.    PREOPERATIVE DIAGNOSIS:  Microscopic hematuria.    POSTOPERATIVE DIAGNOSIS:  Microscopic hematuria.    PROCEDURES PERFORMED:  Cystourethroscopy and bilateral retrograde pyelogram.    ANESTHESIA:  MAC.    IMMEDIATE COMPLICATIONS:  None.    ESTIMATED BLOOD LOSS:  None.    BRIEF HISTORY:  This is a 54-year-old female with recurrent microscopic   hematuria.  The risks and benefits of the procedure were explained to the   patient in detail and appropriate consents were signed.    PROCEDURE IN DETAIL:  The patient was taken to the OR and laid in supine   position.  Appropriate anesthesia was administered.  She was then placed in the   dorsal lithotomy position.  Genitalia were prepped and draped in a standard   surgical fashion.  A 22-Portuguese rigid cystoscope was placed in the bladder.  No   obvious urethral or bladder lesions were evident.  Bladder capacity was   adequate.  Some mild inflammatory changes were noted around the bladder neck.    Both ureteral orifices appeared to have clear efflux.  A 5-Portuguese open-ended   catheter was placed into the left and right ureteral orifices respectively.    Both ureters were normal along their course without any filling defects.  The   upper collecting systems were normal and the calices were sharp.  Adequate   drainage was noted bilaterally.  The bladder was drained and reinspected and   again no obvious abnormalities were noted.  The patient was transferred to the   Recovery Room in stable condition.      CRISTIAN  dd: 10/02/2019 12:57:53 (CDT)  td: 10/02/2019 23:10:53 (CDT)  Doc ID   #[id number]  Job ID #[id number]    CC:

## 2019-10-07 ENCOUNTER — HOSPITAL ENCOUNTER (OUTPATIENT)
Dept: RADIOLOGY | Facility: HOSPITAL | Age: 54
Discharge: HOME | End: 2019-10-07
Attending: INTERNAL MEDICINE

## 2019-10-07 ENCOUNTER — CLINICAL SUPPORT (OUTPATIENT)
Dept: CARDIOLOGY | Facility: CLINIC | Age: 54
End: 2019-10-07
Attending: INTERNAL MEDICINE
Payer: COMMERCIAL

## 2019-10-07 VITALS
SYSTOLIC BLOOD PRESSURE: 133 MMHG | WEIGHT: 134.06 LBS | BODY MASS INDEX: 21.55 KG/M2 | HEIGHT: 66 IN | HEART RATE: 63 BPM | DIASTOLIC BLOOD PRESSURE: 88 MMHG

## 2019-10-07 DIAGNOSIS — R01.1 UNDIAGNOSED CARDIAC MURMURS: ICD-10-CM

## 2019-10-07 DIAGNOSIS — R00.1 BRADYCARDIA: ICD-10-CM

## 2019-10-07 DIAGNOSIS — I10 ESSENTIAL HYPERTENSION: ICD-10-CM

## 2019-10-07 DIAGNOSIS — Z91.89 AT RISK FOR CORONARY ARTERY DISEASE: ICD-10-CM

## 2019-10-07 DIAGNOSIS — E78.00 HYPERCHOLESTEROLEMIA: ICD-10-CM

## 2019-10-07 LAB
ASCENDING AORTA: 2.54 CM
BSA FOR ECHO PROCEDURE: 1.68 M2
CV ECHO LV RWT: 0.49 CM
CV STRESS BASE HR: 71 BPM
DIASTOLIC BLOOD PRESSURE: 88 MMHG
DOP CALC LVOT AREA: 3 CM2
DOP CALC LVOT DIAMETER: 1.97 CM
DOP CALC LVOT PEAK VEL: 1.09 M/S
DOP CALC LVOT STROKE VOLUME: 67.88 CM3
DOP CALCLVOT PEAK VEL VTI: 22.28 CM
E WAVE DECELERATION TIME: 166.41 MSEC
E/A RATIO: 1.81
E/E' RATIO: 5.86 M/S
ECHO LV POSTERIOR WALL: 0.94 CM (ref 0.6–1.1)
FRACTIONAL SHORTENING: 33 % (ref 28–44)
INTERVENTRICULAR SEPTUM: 0.92 CM (ref 0.6–1.1)
IVRT: 0.09 MSEC
LA MAJOR: 4.4 CM
LA MINOR: 4.5 CM
LA WIDTH: 3.6 CM
LEFT ARM DIASTOLIC BLOOD PRESSURE: 60 MMHG
LEFT ARM SYSTOLIC BLOOD PRESSURE: 116 MMHG
LEFT ATRIUM SIZE: 3.54 CM
LEFT ATRIUM VOLUME INDEX: 28.6 ML/M2
LEFT ATRIUM VOLUME: 48.2 CM3
LEFT CBA DIAS: 15 CM/S
LEFT CBA SYS: 56 CM/S
LEFT CCA DIST DIAS: 30 CM/S
LEFT CCA DIST SYS: 96 CM/S
LEFT CCA MID DIAS: 30 CM/S
LEFT CCA MID SYS: 101 CM/S
LEFT CCA PROX DIAS: 30 CM/S
LEFT CCA PROX SYS: 116 CM/S
LEFT ECA DIAS: 7 CM/S
LEFT ECA SYS: 77 CM/S
LEFT ICA DIST DIAS: 47 CM/S
LEFT ICA DIST SYS: 111 CM/S
LEFT ICA MID DIAS: 37 CM/S
LEFT ICA MID SYS: 105 CM/S
LEFT ICA PROX DIAS: 17 CM/S
LEFT ICA PROX SYS: 71 CM/S
LEFT INTERNAL DIMENSION IN SYSTOLE: 2.59 CM (ref 2.1–4)
LEFT VENTRICLE DIASTOLIC VOLUME INDEX: 37.79 ML/M2
LEFT VENTRICLE DIASTOLIC VOLUME: 63.75 ML
LEFT VENTRICLE MASS INDEX: 64 G/M2
LEFT VENTRICLE SYSTOLIC VOLUME INDEX: 14.4 ML/M2
LEFT VENTRICLE SYSTOLIC VOLUME: 24.32 ML
LEFT VENTRICULAR INTERNAL DIMENSION IN DIASTOLE: 3.85 CM (ref 3.5–6)
LEFT VENTRICULAR MASS: 108.01 G
LEFT VERTEBRAL DIAS: 17 CM/S
LEFT VERTEBRAL SYS: 60 CM/S
LV LATERAL E/E' RATIO: 5.67 M/S
LV SEPTAL E/E' RATIO: 6.07 M/S
MV PEAK A VEL: 0.47 M/S
MV PEAK E VEL: 0.85 M/S
OHS CV CAROTID RIGHT ICA EDV HIGHEST: 45
OHS CV CAROTID ULTRASOUND LEFT ICA/CCA RATIO: 0.96
OHS CV CAROTID ULTRASOUND RIGHT ICA/CCA RATIO: 1.04
OHS CV CPX 1 MINUTE RECOVERY HEART RATE: 121 BPM
OHS CV CPX 85 PERCENT MAX PREDICTED HEART RATE MALE: 134
OHS CV CPX ESTIMATED METS: 13
OHS CV CPX MAX PREDICTED HEART RATE: 158
OHS CV CPX PATIENT IS FEMALE: 1
OHS CV CPX PATIENT IS MALE: 0
OHS CV CPX PEAK DIASTOLIC BLOOD PRESSURE: 75 MMHG
OHS CV CPX PEAK HEAR RATE: 164 BPM
OHS CV CPX PEAK RATE PRESSURE PRODUCT: NORMAL
OHS CV CPX PEAK SYSTOLIC BLOOD PRESSURE: 163 MMHG
OHS CV CPX PERCENT MAX PREDICTED HEART RATE ACHIEVED: 104
OHS CV CPX RATE PRESSURE PRODUCT PRESENTING: 9443
OHS CV PV CAROTID LEFT HIGHEST CCA: 116
OHS CV PV CAROTID LEFT HIGHEST ICA: 111
OHS CV PV CAROTID RIGHT HIGHEST CCA: 114
OHS CV PV CAROTID RIGHT HIGHEST ICA: 118
OHS CV US CAROTID LEFT HIGHEST EDV: 47
PISA TR MAX VEL: 1.75 M/S
PULM VEIN S/D RATIO: 1.69
PV PEAK D VEL: 0.45 M/S
PV PEAK S VEL: 0.76 M/S
RA MAJOR: 4.35 CM
RA WIDTH: 3.52 CM
RIGHT ARM DIASTOLIC BLOOD PRESSURE: 60 MMHG
RIGHT ARM SYSTOLIC BLOOD PRESSURE: 116 MMHG
RIGHT CBA DIAS: 15 CM/S
RIGHT CBA SYS: 60 CM/S
RIGHT CCA DIST DIAS: 21 CM/S
RIGHT CCA DIST SYS: 81 CM/S
RIGHT CCA MID DIAS: 21 CM/S
RIGHT CCA MID SYS: 84 CM/S
RIGHT CCA PROX DIAS: 21 CM/S
RIGHT CCA PROX SYS: 114 CM/S
RIGHT ECA DIAS: 6 CM/S
RIGHT ECA SYS: 94 CM/S
RIGHT ICA DIST DIAS: 34 CM/S
RIGHT ICA DIST SYS: 97 CM/S
RIGHT ICA MID DIAS: 45 CM/S
RIGHT ICA MID SYS: 118 CM/S
RIGHT ICA PROX DIAS: 26 CM/S
RIGHT ICA PROX SYS: 77 CM/S
RIGHT VERTEBRAL DIAS: 13 CM/S
RIGHT VERTEBRAL SYS: 45 CM/S
SINUS: 2.87 CM
STJ: 2.51 CM
STRESS ECHO POST EXERCISE DUR MIN: 7 MINUTES
STRESS ECHO POST EXERCISE DUR SEC: 14 SECONDS
STRESS ST DEPRESSION: 1.3 MM
SYSTOLIC BLOOD PRESSURE: 133 MMHG
TDI LATERAL: 0.15 M/S
TDI SEPTAL: 0.14 M/S
TDI: 0.15 M/S
TR MAX PG: 12 MMHG

## 2019-10-07 PROCEDURE — 75571 CT CALCIUM SCORING CARDIAC: ICD-10-PCS | Mod: 26,,, | Performed by: RADIOLOGY

## 2019-10-07 PROCEDURE — 99999 PR PBB SHADOW E&M-EST. PATIENT-LVL II: CPT | Mod: PBBFAC,,,

## 2019-10-07 PROCEDURE — 93880 CV US DOPPLER CAROTID (CUPID ONLY): ICD-10-PCS | Mod: S$GLB,,, | Performed by: INTERNAL MEDICINE

## 2019-10-07 PROCEDURE — 99999 PR PBB SHADOW E&M-EST. PATIENT-LVL II: ICD-10-PCS | Mod: PBBFAC,,,

## 2019-10-07 PROCEDURE — 75571 CT HRT W/O DYE W/CA TEST: CPT | Mod: TC,PO

## 2019-10-07 PROCEDURE — 75571 CT HRT W/O DYE W/CA TEST: CPT | Mod: 26,,, | Performed by: RADIOLOGY

## 2019-10-07 PROCEDURE — 93351 STRESS ECHO (CUPID ONLY): ICD-10-PCS | Mod: S$GLB,,, | Performed by: INTERNAL MEDICINE

## 2019-10-07 PROCEDURE — 93880 EXTRACRANIAL BILAT STUDY: CPT | Mod: S$GLB,,, | Performed by: INTERNAL MEDICINE

## 2019-10-07 PROCEDURE — 93351 STRESS TTE COMPLETE: CPT | Mod: S$GLB,,, | Performed by: INTERNAL MEDICINE

## 2019-10-08 ENCOUNTER — PATIENT MESSAGE (OUTPATIENT)
Dept: CARDIOLOGY | Facility: CLINIC | Age: 54
End: 2019-10-08

## 2019-10-08 NOTE — PROGRESS NOTES
Digital Medicine: Clinician Follow-Up    Called patient to follow up. Patient endorses adherence to medication regimen. Patient denies hypotensive s/sx (lightheadedness, dizziness, nausea, fatigue); patient denies hypertensive s/sx (SOB, CP, severe headaches, changes in vision). Instructed patient to seek medical care if BP > 180/110 and is accompanied by hypertensive s/sx associated, patient confirms understanding. She reports that she is taking half tablet of HCTZ every other day under the direction of her physician.           The history is provided by the patient. No  was used.     Follow Up  Follow-up reason(s): routine education        Last 5 Patient Entered Readings                                      Current 30 Day Average: 113/78     Recent Readings 10/7/2019 10/6/2019 10/4/2019 10/2/2019 9/29/2019    SBP (mmHg) 133 113 116 106 117    DBP (mmHg) 89 78 84 74 87    Pulse 83 84 88 61 77              Sleep Apnea  Patient not previously diagnosed with ADRIANO and     Medication Affordability  Patient is currently not having problems affording medications    Medication Adherence:   She misses doses: never    Patient is selectively taking diuretics.    She does not wonder if medications are working.  She knows purpose of medications.      Patient identified the following reasons for non-compliance: none    Adherence tools used: none    Assessment:  Reviewed recent readings. Per 2017 ACC/ AHA HTN guidelines (goal of BP < 130/80), current 30-day average is well controlled.       INTERVENTION(S)  reviewed appropriate dose schedule, reviewed monitoring technique, recommended med change, encouragement/support and goal setting    PLAN  patient verbalizes understanding    Continue current medication regimen. Will consult with patient's PCP regarding diuretic schedule. Encouraged patient to continue to check BP using proper measurement techniques. I will continue to monitor regularly and will follow-up in  2 to 3 weeks, sooner if blood pressure begins to trend upward or downward.       There are no preventive care reminders to display for this patient.      Hypertension Medications             hydroCHLOROthiazide (HYDRODIURIL) 25 MG tablet Take 0.5 tablets (12.5 mg total) by mouth every other day.

## 2019-10-08 NOTE — TELEPHONE ENCOUNTER
Please advise: patient requesting results of calcium score. Anything specific you would like her to know?

## 2019-10-14 ENCOUNTER — PATIENT MESSAGE (OUTPATIENT)
Dept: CARDIOLOGY | Facility: CLINIC | Age: 54
End: 2019-10-14

## 2019-10-15 NOTE — TELEPHONE ENCOUNTER
Please advise: testing is being done through digital medicine and called [facility] genetic panel. Do you know anything about this?

## 2019-11-05 ENCOUNTER — PATIENT OUTREACH (OUTPATIENT)
Dept: OTHER | Facility: OTHER | Age: 54
End: 2019-11-05

## 2019-11-06 ENCOUNTER — PATIENT OUTREACH (OUTPATIENT)
Dept: OTHER | Facility: OTHER | Age: 54
End: 2019-11-06

## 2019-11-06 NOTE — PROGRESS NOTES
"Digital Medicine: Health  Follow-Up    Patient acknowledges that her readings "have been up and down" but isn't sure why and is feeling okay with no symptoms. Patient possibly attributes the higher BP readings to her "cuff being messed up" but followed hypertensive protocol and states they came back down.   Patient has not made any changes to her lifestyle routine.     The history is provided by the patient.     Follow Up  Follow-up reason(s): reading review      Routine Education Topics: eating patterns and physical activity      INTERVENTION(S)  denied further coaching, denied resources, denied support and denied questions    PLAN  patient verbalizes understanding, patient amenable to changes and Clinician follow-up    Patient was not interested in further discussing her habits or goals. Patient plans on discussing her medication adjustment with her doctor at the end of November but knows to expect a call from [employer].   Patient will continue her dietary and activity habits and start training for her races.       There are no preventive care reminders to display for this patient.    Last 5 Patient Entered Readings                                      Current 30 Day Average: 117/79     Recent Readings 11/3/2019 11/2/2019 11/1/2019 10/30/2019 10/30/2019    SBP (mmHg) 103 121 118 117 191    DBP (mmHg) 84 78 76 73 51    Pulse 77 65 66 65 70            Diet Screening   She has the following dietary restrictions: low carb, gluten free and "no grain"    Patient has not made any changes to her dietary habits.    Assigning the following patient goals: maintain low sodium diet    Physical Activity Screening   When asked if exercising, patient responded: yesHer level of intensity when exercising is moderate.    Patient participates in the following activities: walking and yard/housework    Patient does not go to the gym.  Patient recently signed up for "two races in the next few months."    Medication " "Adherence Screening       Patient acknowledges her BP readings are high and states: "I feel like I should be taking the BP medication every day instead of every other day." Explained [employer]'s role on her care team and offered to transfer the call due to her medication question. Patient was not interested in speaking with [employer] because she is "going to see her doctor at the end of November." Informed patient that [employer] may reach out to her sooner and she confirms understanding. Will route message to [employer].    "

## 2019-11-12 ENCOUNTER — PATIENT MESSAGE (OUTPATIENT)
Dept: OTHER | Facility: OTHER | Age: 54
End: 2019-11-12

## 2019-11-21 NOTE — PROGRESS NOTES
Digital Medicine: Clinician Follow-Up    Called patient to follow up. Patient endorses adherence to medication regimen. Patient denies hypotensive s/sx (lightheadedness, dizziness, nausea, fatigue); patient denies hypertensive s/sx (SOB, CP, severe headaches, changes in vision). Instructed patient to seek medical care if BP > 180/110 and is accompanied by hypertensive s/sx associated, patient confirms understanding. She reports that she had a cold which resulted in her having a headache with an aura. She reports that her headache has gone away but will discuss with PCP during her appointment on Monday. She reports that she is now taking HCTZ daily rather than every other day.         The history is provided by the patient. No  was used.     Follow Up  Follow-up reason(s): routine education        Last 5 Patient Entered Readings                                      Current 30 Day Average: 121/81     Recent Readings 11/18/2019 11/17/2019 11/16/2019 11/15/2019 11/13/2019    SBP (mmHg) 111 116 114 137 117    DBP (mmHg) 76 80 86 89 79    Pulse 75 77 83 81 74        INTERVENTION(S)  reviewed appropriate dose schedule, encouragement/support and goal setting    PLAN  patient verbalizes understanding and continue monitoring    Assessment:  Reviewed recent readings. Per 2017 ACC/ AHA HTN guidelines (goal of BP < 130/80), current 30-day average need to be addressed more throroughly today.     Plan:  Continue current medication regimen. Encouraged patient to continue to check BP using proper measurement techniques. Reviewed BP goals with patient. I will continue to monitor regularly and will follow-up in 2-3 months, sooner if blood pressure begins to trend upward or downward.       There are no preventive care reminders to display for this patient.      Hypertension Medications             hydroCHLOROthiazide (HYDRODIURIL) 25 MG tablet Take 0.5 tablets (12.5 mg total) by mouth every other day.

## 2019-11-22 NOTE — PROGRESS NOTES
PLAN:      Problem List Items Addressed This Visit     Hypercholesterolemia - Primary     Rechecking lipids.  Patient has been on Zetia and fish oil.  Patient is considering niacin.  We discussed Repatha as an option if statins do not work out.    Previous plan:  Patient's cholesterol numbers are elevated on keto diet.  However patient does have a favorable pattern a LDL with increased particle size and low LP IR score.  Patient also has low homocystine ESR and CRP numbers.  However with patient's extremely elevated number recommend therapy to reduce this LDL burden.  Patient would also like CT calcium score.  Referring to Cardiology for further evaluation and treatment.  Starting Zetia today.  Starting fish oil and niacin.  Will repeat lipid panel in 3 months.    Patient was advised statin is first-line therapy for reducing LDL at this time.  Also educated about other options such as Repatha.  Patient defers statin therapy at this time.                                Discussed hyperlipidemia disease course.  Discussed the risk of cardiovascular disease, increase stroke and heart attack risk.  Patient voiced understanding and understood the treatment plan. All questions were answered.  Discussed healthy diet and increased need for exercise.           Encounter for long-term (current) use of medications     Reviewed old labs.  Updating labs.Complete history and physical was completed today.  Complete and thorough medication reconciliation was performed.  Discussed risks and benefits of medications.  Advised patient on orders and health maintenance.  We discussed old records and old labs if available.  Will request any records not available through epic.  Continue current medications listed on your summary sheet.           Relevant Medications    hydroCHLOROthiazide (HYDRODIURIL) 12.5 MG Tab    Other Relevant Orders    TSH    T3, free    T4, free    CBC auto differential    Comprehensive metabolic panel    Hair loss      Checking labs.  Patient advised to follow up with gyn concerning other hormones including estrogen and testosterone.         Relevant Orders    TSH    T3, free    T4, free    Lung nodule     Small 3 mm pulmonary nodule noticed on CT scan.  Patient is nonsmoker.  Patient reports that she may have been exposed to asbestos in the past.  Advised that patient needs to follow up with Pulmonary for further evaluation treatment.             Future Appointments     Date Provider Specialty Appt Notes    11/25/2019  Lab     2/25/2020 Ranjeet Coleman MD Family Medicine 3 month fu per Dr. Coleman    8/24/2020 Phyllis Jose MD Cardiology 9 month f/u           Medication List with Changes/Refills   Current Medications    CALCIUM CARBONATE (OS-BENITA) 600 MG (1,500 MG) TAB    Take 600 mg by mouth once daily.    DICYCLOMINE (BENTYL) 10 MG CAPSULE    daily as needed.     ESTRADIOL 0.05 MG/24 HR TD PTSW (VIVELLE-DOT) 0.05 MG/24 HR    Place 1 patch onto the skin twice a week.    EZETIMIBE (ZETIA) 10 MG TABLET    Take 1 tablet (10 mg total) by mouth once daily.    OMEGA-3 ACID ETHYL ESTERS (LOVAZA) 1 GRAM CAPSULE    Take 2 g by mouth 2 (two) times daily.    SACCHAROMYCES BOULARDII (FLORASTOR) 250 MG CAPSULE    Take 250 mg by mouth once daily.    TESTOSTERONE (TESTIM) 50 MG/5 GRAM (1 %) GEL    Apply topically once daily.   Changed and/or Refilled Medications    Modified Medication Previous Medication    HYDROCHLOROTHIAZIDE (HYDRODIURIL) 12.5 MG TAB hydroCHLOROthiazide (HYDRODIURIL) 25 MG tablet       Take 1 tablet (12.5 mg total) by mouth once daily.    Take 0.5 tablets (12.5 mg total) by mouth every other day.   Discontinued Medications    ACANYA 1.2-2.5 % GLWP    1 g by Other route once daily. Apply 1 g to face daily       Ranjeet Coleman M.D.     ==========================================================================  Subjective:      Patient ID: Viridiana Ross is a 54 y.o. female.  has a past medical history of Acne,  Diverticulosis, Encounter for blood transfusion, Hyperlipidemia, Hypertension, and IBS (irritable bowel syndrome).     Chief Complaint: Follow-up; Hair Loss; Hyperlipidemia; and Hypertension    Patient coming in to discuss cholesterol, hair loss and nodule found on lung during stress test with Dr. Jose.    Problem List Items Addressed This Visit     Hypercholesterolemia - Primary    Overview     Lab Results   Component Value Date    CHOL 212 (H) 09/15/2017    CHOL 234 (H) 06/09/2017    CHOL 275 (H) 12/09/2016     Lab Results   Component Value Date    HDL 68 09/15/2017    HDL 75 06/09/2017    HDL 76 (H) 12/09/2016     Lab Results   Component Value Date    LDLCALC 135.0 09/15/2017    LDLCALC 144.4 06/09/2017    LDLCALC 184.0 (H) 12/09/2016     Lab Results   Component Value Date    TRIG 45 09/15/2017    TRIG 73 06/09/2017    TRIG 75 12/09/2016     Lab Results   Component Value Date    CHOLHDL 32.1 09/15/2017    CHOLHDL 32.1 06/09/2017    CHOLHDL 27.6 12/09/2016     Chronic.  Untreated.  Only using diet and exercise.  The most recent lipid panel was done by her OBGYN on May 31, 2019.  Showing total cholesterol is still elevated 286.  Triglycerides are normal at 82 HDL is therapeutic at 89 LDL is still high at 181.  Patient has strong family history of heart attacks in her family.  Thyroid studies were normal.  Testosterone is within normal limits.  Blood count and metabolic panel within normal limits  ==========================  Orders Only on 08/30/2019   Component Date Value Ref Range Status    LDL-P 08/30/2019 2,543* <1,000 nmol/L Final    Comment:                           Low                   < 1000                            Moderate         1000 - 1299                            Borderline-High  1300 - 1599                            High             1600 - 2000                            Very High             > 2000      LDL-C 08/30/2019 260* 0 - 99 mg/dL Final    Comment:                           Optimal                <  100                            Above optimal     100 -  129                            Borderline        130 -  159                            High              160 -  189                            Very high             >  189  LDL-C is inaccurate if patient is non-fasting.      HDL-C 08/30/2019 94  >39 mg/dL Final    Triglycerides 08/30/2019 64  0 - 149 mg/dL Final    Cholesterol, Total 08/30/2019 367* 100 - 199 mg/dL Final    HDL-P (Total) 08/30/2019 44.0  >=30.5 umol/L Final    Small LDL-P 08/30/2019 220  <=527 nmol/L Final    LDL Size 08/30/2019 22.1  >20.5 nm Final    Comment:  ----------------------------------------------------------                   ** INTERPRETATIVE INFORMATION**                   PARTICLE CONCENTRATION AND SIZE                      <--Lower CVD Risk   Higher CVD Risk-->    LDL AND HDL PARTICLES   Percentile in Reference Population    HDL-P (total)        High     75th    50th    25th   Low                         >34.9    34.9    30.5    26.7   <26.7    Small LDL-P          Low      25th    50th    75th   High                         <117     117     527     839    >839    LDL Size   <-Large (Pattern A)->    <-Small (Pattern B)->                      23.0    20.6           20.5      19.0   ----------------------------------------------------------  Small LDL-P and LDL Size are associated with CVD risk, but not after  LDL-P is taken into account.  These assays were developed and their performance characteristics  determined by [employer]. These assays have not been cleared by the  [place] Food and Drug Administration. The clinical utility o                           f these  laboratory values have not been fully established.      LP-IR Score 08/30/2019 <25  <=45 Final    Comment: INSULIN RESISTANCE MARKER      <--Insulin Sensitive    Insulin Resistant-->             Percentile in Reference Population  Insulin Resistance Score  LP-IR Score   Low   25th   50th   75th    High                <27   27     45     63     >63  LP-IR Score is inaccurate if patient is non-fasting.  The LP-IR score is a laboratory developed index that has been  associated with insulin resistance and diabetes risk and should be  used as one component of a physician's clinical assessment. The  LP-IR score listed above has not been cleared by the [place] Food and  Drug Administration.       Patient does not want to take a statin at this time.  But is willing to take Zetia and fish oil and niacin.  Patient is concerned about her strong family history of heart attacks and strokes.  Patient denies any symptoms of chest pain shortness of breath blurry vision dizziness lightheadedness abdominal pain etc.         Current Assessment & Plan     Rechecking lipids.  Patient has been on Zetia and fish oil.  Patient is considering niacin.  We discussed Repatha as an option if statins do not work out.    Previous plan:  Patient's cholesterol numbers are elevated on keto diet.  However patient does have a favorable pattern a LDL with increased particle size and low LP IR score.  Patient also has low homocystine ESR and CRP numbers.  However with patient's extremely elevated number recommend therapy to reduce this LDL burden.  Patient would also like CT calcium score.  Referring to Cardiology for further evaluation and treatment.  Starting Zetia today.  Starting fish oil and niacin.  Will repeat lipid panel in 3 months.    Patient was advised statin is first-line therapy for reducing LDL at this time.  Also educated about other options such as Repatha.  Patient defers statin therapy at this time.                                Discussed hyperlipidemia disease course.  Discussed the risk of cardiovascular disease, increase stroke and heart attack risk.  Patient voiced understanding and understood the treatment plan. All questions were answered.  Discussed healthy diet and increased need for exercise.           Encounter for  long-term (current) use of medications    Overview     06/27/2019 Patient is on CHRONIC long-term drug therapy for managed conditions. See medication list. Reports compliance.  No side effects reported.  Routine lab work is being monitored.  Patient does not need refills today.    =====================================  09/10/2019Update.  Patient here with concerns about her recent lipid panel.  Patient reports compliance with hydrochlorothiazide for hypertension and lower extremity edema.  Blood pressure is low normal today.  Patient compliant with other medications as well.  Patient not currently taking anything for cholesterol.    Patient does do KETO diet which may have made her cholesterol worse.  =======================================================  November 2019:    CHRONIC. Stable. Compliant with medications for managed conditions. See medication list. No SE reported.   Routine lab analysis is being monitored. Refills were addressed.  Lab Results   Component Value Date    WBC 14.74 (H) 05/15/2018    HGB 14.3 05/15/2018    HCT 42.0 05/15/2018    MCV 91 05/15/2018     05/15/2018       Chemistry        Component Value Date/Time     05/15/2018 2045    K 3.5 05/15/2018 2045    CL 99 05/15/2018 2045    CO2 28 05/15/2018 2045    BUN 17 05/15/2018 2045    CREATININE 0.90 05/15/2018 2045     05/15/2018 2045        Component Value Date/Time    CALCIUM 9.8 05/15/2018 2045    ALKPHOS 84 05/15/2018 2045    AST 34 05/15/2018 2045    ALT 37 05/15/2018 2045    BILITOT 1.3 05/15/2018 2045    ESTGFRAFRICA >60 05/15/2018 2045    EGFRNONAA >60 05/15/2018 2045        Lab Results   Component Value Date    TSH 2.108 12/09/2016   ======================================================         Current Assessment & Plan     Reviewed old labs.  Updating labs.Complete history and physical was completed today.  Complete and thorough medication reconciliation was performed.  Discussed risks and benefits of  medications.  Advised patient on orders and health maintenance.  We discussed old records and old labs if available.  Will request any records not available through epic.  Continue current medications listed on your summary sheet.           Hair loss    Overview     Acute.  Worsening.  Patient reporting that she is losing large amounts of hair due to the point where her hairdresser noticed it and told the patient she needs to be evaluated.    Lab Results   Component Value Date    TSH 2.108 2016              Current Assessment & Plan     Checking labs.  Patient advised to follow up with gyn concerning other hormones including estrogen and testosterone.         Lung nodule    Overview     Impression       1. The coronary artery calcium screening score is 47.  This implies definite, at least mild, atherosclerotic plaque deposition within the coronary arteries.  Mild or minimal coronary artery narrowings are likely.  2. 3 mm left lower lobe pulmonary nodule.  For a solid nodule <6 mm, Fleischner Society 2017 guidelines recommend no routine follow up for a low risk patient, or follow-up with non-contrast chest CT at 12 months in a high risk patient.  This report was flagged in Epic as abnormal.      Electronically signed by: Nestor Gale MD  Date: 10/07/2019  Time: 16:45            Current Assessment & Plan     Small 3 mm pulmonary nodule noticed on CT scan.  Patient is nonsmoker.  Patient reports that she may have been exposed to asbestos in the past.  Advised that patient needs to follow up with Pulmonary for further evaluation treatment.                Past Medical History:  Past Medical History:   Diagnosis Date    Acne     Diverticulosis     Encounter for blood transfusion     Hyperlipidemia     Hypertension     IBS (irritable bowel syndrome)      Past Surgical History:   Procedure Laterality Date     SECTION      X3    CYSTOSCOPY W/ RETROGRADES N/A 10/2/2019    Procedure: CYSTOSCOPY, WITH  RETROGRADE PYELOGRAM;  Surgeon: Fred Aranda MD;  Location: [facility] OR;  Service: Urology;  Laterality: N/A;    EYE SURGERY  1995    HYSTERECTOMY      TUBAL LIGATION  5/1994     Review of patient's allergies indicates:  No Known Allergies  Medication List with Changes/Refills   Current Medications    CALCIUM CARBONATE (OS-BENITA) 600 MG (1,500 MG) TAB    Take 600 mg by mouth once daily.    DICYCLOMINE (BENTYL) 10 MG CAPSULE    daily as needed.     ESTRADIOL 0.05 MG/24 HR TD PTSW (VIVELLE-DOT) 0.05 MG/24 HR    Place 1 patch onto the skin twice a week.    EZETIMIBE (ZETIA) 10 MG TABLET    Take 1 tablet (10 mg total) by mouth once daily.    OMEGA-3 ACID ETHYL ESTERS (LOVAZA) 1 GRAM CAPSULE    Take 2 g by mouth 2 (two) times daily.    SACCHAROMYCES BOULARDII (FLORASTOR) 250 MG CAPSULE    Take 250 mg by mouth once daily.    TESTOSTERONE (TESTIM) 50 MG/5 GRAM (1 %) GEL    Apply topically once daily.   Changed and/or Refilled Medications    Modified Medication Previous Medication    HYDROCHLOROTHIAZIDE (HYDRODIURIL) 12.5 MG TAB hydroCHLOROthiazide (HYDRODIURIL) 25 MG tablet       Take 1 tablet (12.5 mg total) by mouth once daily.    Take 0.5 tablets (12.5 mg total) by mouth every other day.   Discontinued Medications    ACANYA 1.2-2.5 % GLWP    1 g by Other route once daily. Apply 1 g to face daily      Social History     Tobacco Use    Smoking status: Never Smoker    Smokeless tobacco: Never Used   Substance Use Topics    Alcohol use: Yes     Alcohol/week: 3.0 standard drinks     Types: 1 Glasses of wine, 2 Standard drinks or equivalent per week     Frequency: Never     Comment: daily      Family History   Problem Relation Age of Onset    Hyperlipidemia Mother     Hypertension Mother     Diabetes Mother     Cancer Mother     Diverticulitis Mother     Alzheimer's disease Mother     Depression Mother     Hearing loss Mother     Heart disease Mother     Hypertension Father     Hyperlipidemia Father      "Stroke Father     Hearing loss Father     Heart disease Father     Diverticulitis Sister     Cancer Brother     Cancer Brother     Cancer Brother         Nasopharyngeal - bladder       I have reviewed the complete PMH, social history, surgical history, allergies and medications.  As well as family history.    Review of Systems   Constitutional: Negative for activity change, fatigue and unexpected weight change.   HENT: Negative for congestion, hearing loss, rhinorrhea, sinus pain and trouble swallowing.    Eyes: Negative for discharge and visual disturbance.   Respiratory: Negative for chest tightness, shortness of breath and wheezing.    Cardiovascular: Negative for chest pain, palpitations and leg swelling.   Gastrointestinal: Negative for abdominal pain, blood in stool, constipation, diarrhea, nausea and vomiting.   Endocrine: Negative for polydipsia and polyuria.   Genitourinary: Negative for difficulty urinating, dysuria, frequency, hematuria and menstrual problem.   Musculoskeletal: Negative for arthralgias, joint swelling and neck pain.   Skin: Negative for rash.   Neurological: Negative for dizziness, weakness and headaches.   Psychiatric/Behavioral: Negative for agitation, confusion and dysphoric mood. The patient is not nervous/anxious.      Objective:   /68   Pulse 66   Temp 98.8 °F (37.1 °C)   Ht 5' 5" (1.651 m)   Wt 59 kg (130 lb)   BMI 21.63 kg/m²   Physical Exam   Constitutional: She is oriented to person, place, and time. She appears well-nourished. No distress.   HENT:   Head: Normocephalic and atraumatic.   Eyes: Pupils are equal, round, and reactive to light. EOM are normal.   Neck: Normal range of motion. Neck supple.   Cardiovascular: Normal rate, regular rhythm, normal heart sounds and intact distal pulses.   No murmur heard.  Pulses:       Carotid pulses are 2+ on the right side, and 2+ on the left side.  Pulmonary/Chest: Effort normal and breath sounds normal. No respiratory " distress. She has no wheezes.   Abdominal: Soft. Bowel sounds are normal. She exhibits no distension.   Musculoskeletal: Normal range of motion. She exhibits no edema.   Neurological: She is alert and oriented to person, place, and time. No cranial nerve deficit.   Skin: Skin is warm and dry. Capillary refill takes less than 2 seconds.   Psychiatric: She has a normal mood and affect. Her behavior is normal.   Nursing note and vitals reviewed.      Assessment:     1. Hypercholesterolemia    2. Hair loss    3. Lung nodule    4. Encounter for long-term (current) use of medications      MDM:     I have Reviewed and summarized old records.  I have performed thorough medication reconciliation today and discussed risk and benefits of each medication.  I have reviewed imaging and labs and discussed with patient.  All questions were answered.  I am requesting old records and will review them once they are available.    I have signed for the following orders AND/OR meds.  Orders Placed This Encounter   Procedures    TSH     Standing Status:   Future     Number of Occurrences:   1     Standing Expiration Date:   1/23/2021    T3, free     Standing Status:   Future     Number of Occurrences:   1     Standing Expiration Date:   1/23/2021    T4, free     Standing Status:   Future     Number of Occurrences:   1     Standing Expiration Date:   1/23/2021    CBC auto differential     Standing Status:   Future     Number of Occurrences:   1     Standing Expiration Date:   11/25/2020    Comprehensive metabolic panel     Standing Status:   Future     Number of Occurrences:   1     Standing Expiration Date:   11/25/2020     Medications Ordered This Encounter   Medications    hydroCHLOROthiazide (HYDRODIURIL) 12.5 MG Tab     Sig: Take 1 tablet (12.5 mg total) by mouth once daily.     Dispense:  30 tablet     Refill:  0        Follow up in about 3 months (around 2/25/2020), or if symptoms worsen or fail to improve, for HTN, Med  refills, LAB RESULTS, HLD.    If no improvement in symptoms or symptoms worsen, advised to call/follow-up at clinic or go to ER. Patient voiced understanding and all questions/concerns were addressed.     DISCLAIMER: This note was compiled by using a speech recognition dictation system and therefore please be aware that typographical / speech recognition errors can and do occur.  Please contact me if you see any errors specifically.    Ranjeet Coleman M.D.       Office: [phone] [address] [place] [address]  FAX: [phone]

## 2019-11-25 ENCOUNTER — OFFICE VISIT (OUTPATIENT)
Dept: CARDIOLOGY | Facility: CLINIC | Age: 54
End: 2019-11-25
Payer: COMMERCIAL

## 2019-11-25 ENCOUNTER — LAB VISIT (OUTPATIENT)
Dept: LAB | Facility: HOSPITAL | Age: 54
End: 2019-11-25
Attending: FAMILY MEDICINE
Payer: COMMERCIAL

## 2019-11-25 ENCOUNTER — OFFICE VISIT (OUTPATIENT)
Dept: FAMILY MEDICINE | Facility: CLINIC | Age: 54
End: 2019-11-25
Payer: COMMERCIAL

## 2019-11-25 VITALS
WEIGHT: 130 LBS | HEART RATE: 66 BPM | SYSTOLIC BLOOD PRESSURE: 104 MMHG | BODY MASS INDEX: 21.66 KG/M2 | HEIGHT: 65 IN | DIASTOLIC BLOOD PRESSURE: 68 MMHG | TEMPERATURE: 99 F

## 2019-11-25 VITALS
WEIGHT: 131.81 LBS | SYSTOLIC BLOOD PRESSURE: 130 MMHG | HEIGHT: 65 IN | HEART RATE: 65 BPM | BODY MASS INDEX: 21.96 KG/M2 | DIASTOLIC BLOOD PRESSURE: 83 MMHG

## 2019-11-25 DIAGNOSIS — R93.1 AGATSTON CAC SCORE, <100: ICD-10-CM

## 2019-11-25 DIAGNOSIS — R91.1 LUNG NODULE: ICD-10-CM

## 2019-11-25 DIAGNOSIS — Z79.899 ENCOUNTER FOR LONG-TERM (CURRENT) USE OF MEDICATIONS: ICD-10-CM

## 2019-11-25 DIAGNOSIS — L65.9 HAIR LOSS: ICD-10-CM

## 2019-11-25 DIAGNOSIS — I10 ESSENTIAL HYPERTENSION: Primary | ICD-10-CM

## 2019-11-25 DIAGNOSIS — E78.00 HYPERCHOLESTEROLEMIA: Primary | ICD-10-CM

## 2019-11-25 DIAGNOSIS — I36.1 NONRHEUMATIC TRICUSPID VALVE REGURGITATION: ICD-10-CM

## 2019-11-25 DIAGNOSIS — E78.00 HYPERCHOLESTEROLEMIA: ICD-10-CM

## 2019-11-25 PROBLEM — R01.1 UNDIAGNOSED CARDIAC MURMURS: Status: RESOLVED | Noted: 2019-09-30 | Resolved: 2019-11-25

## 2019-11-25 LAB
BASOPHILS # BLD AUTO: 0.06 K/UL (ref 0–0.2)
BASOPHILS NFR BLD: 1 % (ref 0–1.9)
DIFFERENTIAL METHOD: ABNORMAL
EOSINOPHIL # BLD AUTO: 0.1 K/UL (ref 0–0.5)
EOSINOPHIL NFR BLD: 2.3 % (ref 0–8)
ERYTHROCYTE [DISTWIDTH] IN BLOOD BY AUTOMATED COUNT: 12.6 % (ref 11.5–14.5)
HCT VFR BLD AUTO: 45 % (ref 37–48.5)
HGB BLD-MCNC: 14.1 G/DL (ref 12–16)
IMM GRANULOCYTES # BLD AUTO: 0.06 K/UL (ref 0–0.04)
IMM GRANULOCYTES NFR BLD AUTO: 1 % (ref 0–0.5)
LYMPHOCYTES # BLD AUTO: 2 K/UL (ref 1–4.8)
LYMPHOCYTES NFR BLD: 32 % (ref 18–48)
MCH RBC QN AUTO: 29.9 PG (ref 27–31)
MCHC RBC AUTO-ENTMCNC: 31.3 G/DL (ref 32–36)
MCV RBC AUTO: 96 FL (ref 82–98)
MONOCYTES # BLD AUTO: 0.5 K/UL (ref 0.3–1)
MONOCYTES NFR BLD: 8.8 % (ref 4–15)
NEUTROPHILS # BLD AUTO: 3.4 K/UL (ref 1.8–7.7)
NEUTROPHILS NFR BLD: 54.9 % (ref 38–73)
NRBC BLD-RTO: 0 /100 WBC
PLATELET # BLD AUTO: 227 K/UL (ref 150–350)
PMV BLD AUTO: 10.9 FL (ref 9.2–12.9)
RBC # BLD AUTO: 4.71 M/UL (ref 4–5.4)
WBC # BLD AUTO: 6.15 K/UL (ref 3.9–12.7)

## 2019-11-25 PROCEDURE — 3074F PR MOST RECENT SYSTOLIC BLOOD PRESSURE < 130 MM HG: ICD-10-PCS | Mod: CPTII,S$GLB,, | Performed by: FAMILY MEDICINE

## 2019-11-25 PROCEDURE — 84443 ASSAY THYROID STIM HORMONE: CPT

## 2019-11-25 PROCEDURE — 84439 ASSAY OF FREE THYROXINE: CPT

## 2019-11-25 PROCEDURE — 99214 OFFICE O/P EST MOD 30 MIN: CPT | Mod: S$GLB,,, | Performed by: FAMILY MEDICINE

## 2019-11-25 PROCEDURE — 85025 COMPLETE CBC W/AUTO DIFF WBC: CPT

## 2019-11-25 PROCEDURE — 3078F PR MOST RECENT DIASTOLIC BLOOD PRESSURE < 80 MM HG: ICD-10-PCS | Mod: CPTII,S$GLB,, | Performed by: FAMILY MEDICINE

## 2019-11-25 PROCEDURE — 3079F DIAST BP 80-89 MM HG: CPT | Mod: CPTII,S$GLB,, | Performed by: INTERNAL MEDICINE

## 2019-11-25 PROCEDURE — 3074F SYST BP LT 130 MM HG: CPT | Mod: CPTII,S$GLB,, | Performed by: FAMILY MEDICINE

## 2019-11-25 PROCEDURE — 99213 PR OFFICE/OUTPT VISIT, EST, LEVL III, 20-29 MIN: ICD-10-PCS | Mod: S$GLB,,, | Performed by: INTERNAL MEDICINE

## 2019-11-25 PROCEDURE — 3008F BODY MASS INDEX DOCD: CPT | Mod: CPTII,S$GLB,, | Performed by: INTERNAL MEDICINE

## 2019-11-25 PROCEDURE — 3075F SYST BP GE 130 - 139MM HG: CPT | Mod: CPTII,S$GLB,, | Performed by: INTERNAL MEDICINE

## 2019-11-25 PROCEDURE — 3078F DIAST BP <80 MM HG: CPT | Mod: CPTII,S$GLB,, | Performed by: FAMILY MEDICINE

## 2019-11-25 PROCEDURE — 36415 COLL VENOUS BLD VENIPUNCTURE: CPT | Mod: PO

## 2019-11-25 PROCEDURE — 99214 PR OFFICE/OUTPT VISIT, EST, LEVL IV, 30-39 MIN: ICD-10-PCS | Mod: S$GLB,,, | Performed by: FAMILY MEDICINE

## 2019-11-25 PROCEDURE — 99999 PR PBB SHADOW E&M-EST. PATIENT-LVL III: CPT | Mod: PBBFAC,,, | Performed by: FAMILY MEDICINE

## 2019-11-25 PROCEDURE — 3075F PR MOST RECENT SYSTOLIC BLOOD PRESS GE 130-139MM HG: ICD-10-PCS | Mod: CPTII,S$GLB,, | Performed by: INTERNAL MEDICINE

## 2019-11-25 PROCEDURE — 99999 PR PBB SHADOW E&M-EST. PATIENT-LVL III: CPT | Mod: PBBFAC,,, | Performed by: INTERNAL MEDICINE

## 2019-11-25 PROCEDURE — 99999 PR PBB SHADOW E&M-EST. PATIENT-LVL III: ICD-10-PCS | Mod: PBBFAC,,, | Performed by: INTERNAL MEDICINE

## 2019-11-25 PROCEDURE — 3008F PR BODY MASS INDEX (BMI) DOCUMENTED: ICD-10-PCS | Mod: CPTII,S$GLB,, | Performed by: INTERNAL MEDICINE

## 2019-11-25 PROCEDURE — 99999 PR PBB SHADOW E&M-EST. PATIENT-LVL III: ICD-10-PCS | Mod: PBBFAC,,, | Performed by: FAMILY MEDICINE

## 2019-11-25 PROCEDURE — 3008F BODY MASS INDEX DOCD: CPT | Mod: CPTII,S$GLB,, | Performed by: FAMILY MEDICINE

## 2019-11-25 PROCEDURE — 3008F PR BODY MASS INDEX (BMI) DOCUMENTED: ICD-10-PCS | Mod: CPTII,S$GLB,, | Performed by: FAMILY MEDICINE

## 2019-11-25 PROCEDURE — 3079F PR MOST RECENT DIASTOLIC BLOOD PRESSURE 80-89 MM HG: ICD-10-PCS | Mod: CPTII,S$GLB,, | Performed by: INTERNAL MEDICINE

## 2019-11-25 PROCEDURE — 99213 OFFICE O/P EST LOW 20 MIN: CPT | Mod: S$GLB,,, | Performed by: INTERNAL MEDICINE

## 2019-11-25 PROCEDURE — 84481 FREE ASSAY (FT-3): CPT

## 2019-11-25 PROCEDURE — 80053 COMPREHEN METABOLIC PANEL: CPT

## 2019-11-25 RX ORDER — OMEGA-3-ACID ETHYL ESTERS 1 G/1
2 CAPSULE, LIQUID FILLED ORAL 2 TIMES DAILY
COMMUNITY
End: 2020-12-18

## 2019-11-25 RX ORDER — HYDROCHLOROTHIAZIDE 12.5 MG/1
12.5 TABLET ORAL DAILY
Qty: 30 TABLET | Refills: 0
Start: 2019-11-25 | End: 2019-12-16 | Stop reason: SDUPTHER

## 2019-11-25 NOTE — ASSESSMENT & PLAN NOTE
Small 3 mm pulmonary nodule noticed on CT scan.  Patient is nonsmoker.  Patient reports that she may have been exposed to asbestos in the past.  Advised that patient needs to follow up with Pulmonary for further evaluation treatment.

## 2019-11-25 NOTE — ASSESSMENT & PLAN NOTE
Reviewed old labs.  Updating labs.Complete history and physical was completed today.  Complete and thorough medication reconciliation was performed.  Discussed risks and benefits of medications.  Advised patient on orders and health maintenance.  We discussed old records and old labs if available.  Will request any records not available through epic.  Continue current medications listed on your summary sheet.

## 2019-11-25 NOTE — PROGRESS NOTES
Subjective:    Patient ID:  Viridiana Ross is a 54 y.o. female who presents for Hypertension (CAROTID,STRESS ECHO) and Hyperlipidemia        HPI  DISCUSSED TESTS, NEGATIVE STRESS ECHO, MILD TR, NORMAL CAROTIDS, DOING WELL,CA SCORE 47, SMALL MIRELLA NODULE 3 MM, NON SMOKER,  ON ZETIA, NO CHEST PAIN, NO SHORTNESS OF BREATH, NO TIA TYPE SYMPTOMS, NO NEAR-SYNCOPE, SEE ROS    Past Medical History:   Diagnosis Date    Acne     Diverticulosis     Encounter for blood transfusion     Hyperlipidemia     Hypertension     IBS (irritable bowel syndrome)      Past Surgical History:   Procedure Laterality Date     SECTION      X3    CYSTOSCOPY W/ RETROGRADES N/A 10/2/2019    Procedure: CYSTOSCOPY, WITH RETROGRADE PYELOGRAM;  Surgeon: Fred Aranda MD;  Location: [facility] OR;  Service: Urology;  Laterality: N/A;    EYE SURGERY      HYSTERECTOMY      TUBAL LIGATION  1994     Family History   Problem Relation Age of Onset    Hyperlipidemia Mother     Hypertension Mother     Diabetes Mother     Cancer Mother     Diverticulitis Mother     Alzheimer's disease Mother     Depression Mother     Hearing loss Mother     Heart disease Mother     Hypertension Father     Hyperlipidemia Father     Stroke Father     Hearing loss Father     Heart disease Father     Diverticulitis Sister     Cancer Brother     Cancer Brother     Cancer Brother         Nasopharyngeal - bladder     Social History     Socioeconomic History    Marital status:      Spouse name: Not on file    Number of children: 3    Years of education: Not on file    Highest education level: Not on file   Occupational History    Not on file   Social Needs    Financial resource strain: Not hard at all    Food insecurity:     Worry: Never true     Inability: Never true    Transportation needs:     Medical: No     Non-medical: No   Tobacco Use    Smoking status: Never Smoker    Smokeless tobacco: Never Used   Substance and Sexual  Activity    Alcohol use: Yes     Alcohol/week: 3.0 standard drinks     Types: 1 Glasses of wine, 2 Standard drinks or equivalent per week     Frequency: Never     Comment: daily    Drug use: Never    Sexual activity: Yes     Partners: Male     Birth control/protection: Post-menopausal   Lifestyle    Physical activity:     Days per week: 5 days     Minutes per session: 60 min    Stress: Only a little   Relationships    Social connections:     Talks on phone: More than three times a week     Gets together: Three times a week     Attends [employer] service: 1 to 4 times per year     Active member of club or organization: No     Attends meetings of clubs or organizations: Never     Relationship status:    Other Topics Concern    Not on file   Social History Narrative    Not on file       Review of patient's allergies indicates:  No Known Allergies    Current Outpatient Medications:     calcium carbonate (OS-BENITA) 600 mg (1,500 mg) Tab, Take 600 mg by mouth once daily., Disp: , Rfl:     dicyclomine (BENTYL) 10 MG capsule, daily as needed. , Disp: , Rfl:     estradiol 0.05 mg/24 hr td ptsw (VIVELLE-DOT) 0.05 mg/24 hr, Place 1 patch onto the skin twice a week., Disp: , Rfl:     ezetimibe (ZETIA) 10 mg tablet, Take 1 tablet (10 mg total) by mouth once daily., Disp: 90 tablet, Rfl: 3    hydroCHLOROthiazide (HYDRODIURIL) 12.5 MG Tab, Take 1 tablet (12.5 mg total) by mouth once daily., Disp: 30 tablet, Rfl: 0    omega-3 acid ethyl esters (LOVAZA) 1 gram capsule, Take 2 g by mouth 2 (two) times daily., Disp: , Rfl:     Saccharomyces boulardii (FLORASTOR) 250 mg capsule, Take 250 mg by mouth once daily., Disp: , Rfl:     testosterone (TESTIM) 50 mg/5 gram (1 %) Gel, Apply topically once daily., Disp: , Rfl:     Review of Systems   Constitution: Negative for chills, diaphoresis, fever, malaise/fatigue and night sweats.   HENT: Negative for congestion and nosebleeds.    Eyes: Negative for blurred vision and  "visual disturbance.   Cardiovascular: Negative for chest pain, claudication, cyanosis, dyspnea on exertion, irregular heartbeat, leg swelling (OCC), near-syncope, orthopnea, palpitations, paroxysmal nocturnal dyspnea and syncope.   Respiratory: Negative for cough, hemoptysis and shortness of breath.    Endocrine: Negative for cold intolerance, heat intolerance and polyuria.   Hematologic/Lymphatic: Negative for adenopathy. Does not bruise/bleed easily.   Skin: Negative for color change, itching and nail changes.   Musculoskeletal: Negative for back pain, falls and joint pain.   Gastrointestinal: Negative for abdominal pain, change in bowel habit, dysphagia, heartburn, hematemesis, jaundice, melena and vomiting.        IBS   Genitourinary: Negative for dysuria, flank pain and frequency. Hematuria: MICROSCOPIC,TO HAVE CYSTOSCOPE.   Neurological: Negative for brief paralysis, dizziness, focal weakness, light-headedness, loss of balance, numbness, tremors and weakness.   Psychiatric/Behavioral: Negative for altered mental status, depression, memory loss and substance abuse. The patient is not nervous/anxious.    Allergic/Immunologic: Negative for hives and persistent infections.        Objective:      Vitals:    11/25/19 0927   BP: 130/83   Pulse: 65   Weight: 59.8 kg (131 lb 13.4 oz)   Height: 5' 5" (1.651 m)   PainSc: 0-No pain     Body mass index is 21.94 kg/m².    Physical Exam   Constitutional: She is oriented to person, place, and time. She appears well-developed and well-nourished. She is active.   HENT:   Head: Normocephalic and atraumatic.   Mouth/Throat: Oropharynx is clear and moist and mucous membranes are normal.   Eyes: Pupils are equal, round, and reactive to light. Conjunctivae and EOM are normal.   Neck: Trachea normal and normal range of motion. Neck supple. Normal carotid pulses, no hepatojugular reflux and no JVD present. Carotid bruit is not present. No edema and no erythema present. No thyromegaly " present.   Cardiovascular: Normal rate and regular rhythm.  No extrasystoles are present. PMI is not displaced. Exam reveals no gallop and no friction rub.   Murmur heard.   Systolic murmur is present with a grade of 1/6 at the lower left sternal border.  Pulses:       Carotid pulses are 2+ on the right side, and 2+ on the left side.       Radial pulses are 2+ on the right side, and 2+ on the left side.        Femoral pulses are 2+ on the right side, and 2+ on the left side.       Dorsalis pedis pulses are 2+ on the right side, and 2+ on the left side.        Posterior tibial pulses are 2+ on the right side, and 2+ on the left side.   Pulmonary/Chest: Effort normal and breath sounds normal. No tachypnea and no bradypnea. She has no wheezes. She has no rales. She exhibits no tenderness.   Abdominal: Soft. Bowel sounds are normal. She exhibits no distension and no mass. There is no hepatosplenomegaly. There is no CVA tenderness.   Musculoskeletal: Normal range of motion. She exhibits no edema or tenderness.   Lymphadenopathy:     She has no cervical adenopathy.   Neurological: She is alert and oriented to person, place, and time. She has normal strength. She displays no tremor. No cranial nerve deficit.   Skin: Skin is warm and dry. No cyanosis or erythema. No pallor.   Psychiatric: She has a normal mood and affect. Her speech is normal and behavior is normal. Judgment and thought content normal.               ..    Chemistry        Component Value Date/Time     05/15/2018 2045    K 3.5 05/15/2018 2045    CL 99 05/15/2018 2045    CO2 28 05/15/2018 2045    BUN 17 05/15/2018 2045    CREATININE 0.90 05/15/2018 2045     05/15/2018 2045        Component Value Date/Time    CALCIUM 9.8 05/15/2018 2045    ALKPHOS 84 05/15/2018 2045    AST 34 05/15/2018 2045    ALT 37 05/15/2018 2045    BILITOT 1.3 05/15/2018 2045    ESTGFRAFRICA >60 05/15/2018 2045    EGFRNONAA >60 05/15/2018 2045            ..  Lab Results    Component Value Date    CHOL 286 (A) 05/31/2019    CHOL 212 (H) 09/15/2017    CHOL 234 (H) 06/09/2017     Lab Results   Component Value Date    HDL 89 05/31/2019    HDL 68 09/15/2017    HDL 75 06/09/2017     Lab Results   Component Value Date    LDLCALC 181 (A) 05/31/2019    LDLCALC 135.0 09/15/2017    LDLCALC 144.4 06/09/2017     Lab Results   Component Value Date    TRIG 64 08/30/2019    TRIG 82 05/31/2019    TRIG 45 09/15/2017     Lab Results   Component Value Date    CHOLHDL 32.1 09/15/2017    CHOLHDL 32.1 06/09/2017    CHOLHDL 27.6 12/09/2016     ..  Lab Results   Component Value Date    WBC 6.15 11/25/2019    HGB 14.1 11/25/2019    HCT 45.0 11/25/2019    MCV 96 11/25/2019     11/25/2019       Test(s) Reviewed  I have reviewed the following in detail:  [x] Stress test   [] Angiography   [] Echocardiogram   [x] Labs   [x] Other:       Assessment:         ICD-10-CM ICD-9-CM   1. Essential hypertension I10 401.9   2. Agatston CAC score, <100 R93.1 793.2   3. Hypercholesterolemia E78.00 272.0   4. Nonrheumatic tricuspid valve regurgitation I36.1 424.2     Problem List Items Addressed This Visit        Cardiac/Vascular    Essential hypertension - Primary    Overview     Initial HPI:  Chronic.  Stable.  Well controlled.  Has been present for many years.  Taking hydrochlorothiazide.  Reports compliance.  No side effects reported.  Denies any chest pain shortness of breath blurred vision.  Patient does have a strong family history of heart attacks and strokes in the family.    09/10/2019  Patient is compliant with hydrochlorothiazide 25 mg.  Patient has been participating in the digital medicine hypertension program.  Blood pressure is low normal on most occasions.  Patient does have lower extremity edema occasionally which is resolved with the hydrochlorothiazide.  Denies any side effects.  Denies any chest pain shortness of breath blurred vision.  Dizziness or lightheadedness.         Hypercholesterolemia     Overview     Lab Results   Component Value Date    CHOL 212 (H) 09/15/2017    CHOL 234 (H) 06/09/2017    CHOL 275 (H) 12/09/2016     Lab Results   Component Value Date    HDL 68 09/15/2017    HDL 75 06/09/2017    HDL 76 (H) 12/09/2016     Lab Results   Component Value Date    LDLCALC 135.0 09/15/2017    LDLCALC 144.4 06/09/2017    LDLCALC 184.0 (H) 12/09/2016     Lab Results   Component Value Date    TRIG 45 09/15/2017    TRIG 73 06/09/2017    TRIG 75 12/09/2016     Lab Results   Component Value Date    CHOLHDL 32.1 09/15/2017    CHOLHDL 32.1 06/09/2017    CHOLHDL 27.6 12/09/2016     Chronic.  Untreated.  Only using diet and exercise.  The most recent lipid panel was done by her OBGYN on May 31, 2019.  Showing total cholesterol is still elevated 286.  Triglycerides are normal at 82 HDL is therapeutic at 89 LDL is still high at 181.  Patient has strong family history of heart attacks in her family.  Thyroid studies were normal.  Testosterone is within normal limits.  Blood count and metabolic panel within normal limits  ==========================  Orders Only on 08/30/2019   Component Date Value Ref Range Status    LDL-P 08/30/2019 2,543* <1,000 nmol/L Final    Comment:                           Low                   < 1000                            Moderate         1000 - 1299                            Borderline-High  1300 - 1599                            High             1600 - 2000                            Very High             > 2000      LDL-C 08/30/2019 260* 0 - 99 mg/dL Final    Comment:                           Optimal               <  100                            Above optimal     100 -  129                            Borderline        130 -  159                            High              160 -  189                            Very high             >  189  LDL-C is inaccurate if patient is non-fasting.      HDL-C 08/30/2019 94  >39 mg/dL Final    Triglycerides 08/30/2019 64  0 - 149 mg/dL Final     Cholesterol, Total 08/30/2019 367* 100 - 199 mg/dL Final    HDL-P (Total) 08/30/2019 44.0  >=30.5 umol/L Final    Small LDL-P 08/30/2019 220  <=527 nmol/L Final    LDL Size 08/30/2019 22.1  >20.5 nm Final    Comment:  ----------------------------------------------------------                   ** INTERPRETATIVE INFORMATION**                   PARTICLE CONCENTRATION AND SIZE                      <--Lower CVD Risk   Higher CVD Risk-->    LDL AND HDL PARTICLES   Percentile in Reference Population    HDL-P (total)        High     75th    50th    25th   Low                         >34.9    34.9    30.5    26.7   <26.7    Small LDL-P          Low      25th    50th    75th   High                         <117     117     527     839    >839    LDL Size   <-Large (Pattern A)->    <-Small (Pattern B)->                      23.0    20.6           20.5      19.0   ----------------------------------------------------------  Small LDL-P and LDL Size are associated with CVD risk, but not after  LDL-P is taken into account.  These assays were developed and their performance characteristics  determined by [employer]. These assays have not been cleared by the  [place] Food and Drug Administration. The clinical utility o                           f these  laboratory values have not been fully established.      LP-IR Score 08/30/2019 <25  <=45 Final    Comment: INSULIN RESISTANCE MARKER      <--Insulin Sensitive    Insulin Resistant-->             Percentile in Reference Population  Insulin Resistance Score  LP-IR Score   Low   25th   50th   75th   High                <27   27     45     63     >63  LP-IR Score is inaccurate if patient is non-fasting.  The LP-IR score is a laboratory developed index that has been  associated with insulin resistance and diabetes risk and should be  used as one component of a physician's clinical assessment. The  LP-IR score listed above has not been cleared by the [place] Food and  Drug Administration.        Patient does not want to take a statin at this time.  But is willing to take Zetia and fish oil and niacin.  Patient is concerned about her strong family history of heart attacks and strokes.  Patient denies any symptoms of chest pain shortness of breath blurry vision dizziness lightheadedness abdominal pain etc.         Agatston CAC score, <100    Nonrheumatic tricuspid valve regurgitation           Plan:     ALL CV CLINICALLY STABLE, NO ANGINA, NO HF, NO TIA, NO CLINICAL ARRHYTHMIA,CONTINUE CURRENT MEDS, EDUCATION, DIET, EXERCISE, RTC IN 9 MO      Essential hypertension    Agatston CAC score, <100  Comments:  47    Hypercholesterolemia    Nonrheumatic tricuspid valve regurgitation    RTC Low level/low impact aerobic exercise 5x's/wk. Heart healthy diet and risk factor modification.    See labs and med orders.    Aerobic exercise 5x's/wk. Heart healthy diet and risk factor modification.    See labs and med orders.

## 2019-11-25 NOTE — ASSESSMENT & PLAN NOTE
Checking labs.  Patient advised to follow up with gyn concerning other hormones including estrogen and testosterone.

## 2019-11-25 NOTE — PATIENT INSTRUCTIONS
Follow up in about 3 months (around 2/25/2020), or if symptoms worsen or fail to improve, for HTN, Med refills, LAB RESULTS, HLD.     If no improvement in symptoms or symptoms worsen, please be advised to call MD, follow-up at clinic and/or go to ER if becomes severe.    Ranjeet Coleman M.D.         We Offer TELEHEATLH & Same Day Appointments!   Book your Telehealth appointment with me through my nurse or   Clinic appointments on [facility]!    [address] [place] [address]    Office: [phone]     FAX: [phone]    Check out my Facebook Page and Follow Me at: CLICK HERE    Check out my website at [facility] by clicking on: CLICK HERE    To Schedule appointments online, go to [facility]: CLICK HERE     Location: [email or web address]

## 2019-11-25 NOTE — ASSESSMENT & PLAN NOTE
Rechecking lipids.  Patient has been on Zetia and fish oil.  Patient is considering niacin.  We discussed Repatha as an option if statins do not work out.    Previous plan:  Patient's cholesterol numbers are elevated on keto diet.  However patient does have a favorable pattern a LDL with increased particle size and low LP IR score.  Patient also has low homocystine ESR and CRP numbers.  However with patient's extremely elevated number recommend therapy to reduce this LDL burden.  Patient would also like CT calcium score.  Referring to Cardiology for further evaluation and treatment.  Starting Zetia today.  Starting fish oil and niacin.  Will repeat lipid panel in 3 months.    Patient was advised statin is first-line therapy for reducing LDL at this time.  Also educated about other options such as Repatha.  Patient defers statin therapy at this time.                                Discussed hyperlipidemia disease course.  Discussed the risk of cardiovascular disease, increase stroke and heart attack risk.  Patient voiced understanding and understood the treatment plan. All questions were answered.  Discussed healthy diet and increased need for exercise.

## 2019-11-26 ENCOUNTER — TELEPHONE (OUTPATIENT)
Dept: FAMILY MEDICINE | Facility: CLINIC | Age: 54
End: 2019-11-26

## 2019-11-26 DIAGNOSIS — E03.9 ACQUIRED HYPOTHYROIDISM: Primary | ICD-10-CM

## 2019-11-26 LAB
ALBUMIN SERPL BCP-MCNC: 3.7 G/DL (ref 3.5–5.2)
ALP SERPL-CCNC: 78 U/L (ref 55–135)
ALT SERPL W/O P-5'-P-CCNC: 35 U/L (ref 10–44)
ANION GAP SERPL CALC-SCNC: 8 MMOL/L (ref 8–16)
AST SERPL-CCNC: 29 U/L (ref 10–40)
BILIRUB SERPL-MCNC: 0.6 MG/DL (ref 0.1–1)
BUN SERPL-MCNC: 24 MG/DL (ref 6–20)
CALCIUM SERPL-MCNC: 10.2 MG/DL (ref 8.7–10.5)
CHLORIDE SERPL-SCNC: 109 MMOL/L (ref 95–110)
CO2 SERPL-SCNC: 25 MMOL/L (ref 23–29)
CREAT SERPL-MCNC: 0.9 MG/DL (ref 0.5–1.4)
EST. GFR  (AFRICAN AMERICAN): >60 ML/MIN/1.73 M^2
EST. GFR  (NON AFRICAN AMERICAN): >60 ML/MIN/1.73 M^2
GLUCOSE SERPL-MCNC: 85 MG/DL (ref 70–110)
POTASSIUM SERPL-SCNC: 4.5 MMOL/L (ref 3.5–5.1)
PROT SERPL-MCNC: 6.8 G/DL (ref 6–8.4)
SODIUM SERPL-SCNC: 142 MMOL/L (ref 136–145)
T3FREE SERPL-MCNC: 2 PG/ML (ref 2.3–4.2)
T4 FREE SERPL-MCNC: 1.08 NG/DL (ref 0.71–1.51)
TSH SERPL DL<=0.005 MIU/L-ACNC: 2.13 UIU/ML (ref 0.4–4)

## 2019-11-26 RX ORDER — THYROID 30 MG/1
30 TABLET ORAL
Qty: 30 TABLET | Refills: 1 | Status: SHIPPED | OUTPATIENT
Start: 2019-11-26 | End: 2020-01-12

## 2019-11-26 NOTE — TELEPHONE ENCOUNTER
----- Message from Ranjeet Coleman MD sent at 11/26/2019  6:59 AM CST -----  CALL THE PATIENT to discuss results and document verification.    I have sent a message to them with the interpretation (see below).  See if they have any questions and make a follow-up appointment if not already schedule and if needed.    Also please address any outstanding health maintenance that may be due: Mammogram due on 07/13/2019  ====================================    My interpretation that was sent to them through [facility]:    I have reviewed your recent blood work.     Your complete blood count is stable and within normal limits.    Your metabolic panel which shows your glucose, kidney function, electrolytes, and liver function is stable and within normal limits.   Thyroid studies are abnormal showing decreased T3 which is the active hormone which could be contributing to hair loss.  We need to consider thyroid replacement with Synthroid or thyroid pork.  Please follow-up so we can discuss these options.  Or if you would like to start one let me know and I will send the prescription but we will need to check these levels again in 6 to 8 weeks.      Please reply so that we know that you have received this message.

## 2019-11-26 NOTE — PROGRESS NOTES
CALL THE PATIENT to discuss results and document verification.    I have sent a message to them with the interpretation (see below).  See if they have any questions and make a follow-up appointment if not already schedule and if needed.    Also please address any outstanding health maintenance that may be due: Mammogram due on 07/13/2019  ====================================    My interpretation that was sent to them through [facility]:    I have reviewed your recent blood work.     Your complete blood count is stable and within normal limits.    Your metabolic panel which shows your glucose, kidney function, electrolytes, and liver function is stable and within normal limits.   Thyroid studies are abnormal showing decreased T3 which is the active hormone which could be contributing to hair loss.  We need to consider thyroid replacement with Synthroid or thyroid pork.  Please follow-up so we can discuss these options.  Or if you would like to start one let me know and I will send the prescription but we will need to check these levels again in 6 to 8 weeks.      Please reply so that we know that you have received this message.

## 2019-11-26 NOTE — TELEPHONE ENCOUNTER
Called and spoke with patient regarding lab results.  Patient stated that if Dr. Coleman would like her to start on thyroid medication at this time she will be more than happy to do so.  Please advise.

## 2019-12-06 ENCOUNTER — PATIENT OUTREACH (OUTPATIENT)
Dept: OTHER | Facility: OTHER | Age: 54
End: 2019-12-06

## 2019-12-06 ENCOUNTER — LAB VISIT (OUTPATIENT)
Dept: LAB | Facility: HOSPITAL | Age: 54
End: 2019-12-06
Attending: FAMILY MEDICINE
Payer: COMMERCIAL

## 2019-12-06 DIAGNOSIS — E78.00 HYPERCHOLESTEROLEMIA: ICD-10-CM

## 2019-12-06 DIAGNOSIS — E78.00 HYPERCHOLESTEROLEMIA: Primary | ICD-10-CM

## 2019-12-06 LAB
CHOLEST SERPL-MCNC: 203 MG/DL (ref 120–199)
CHOLEST/HDLC SERPL: 2.6 {RATIO} (ref 2–5)
HDLC SERPL-MCNC: 78 MG/DL (ref 40–75)
HDLC SERPL: 38.4 % (ref 20–50)
LDLC SERPL CALC-MCNC: 117.8 MG/DL (ref 63–159)
NONHDLC SERPL-MCNC: 125 MG/DL
TRIGL SERPL-MCNC: 36 MG/DL (ref 30–150)

## 2019-12-06 PROCEDURE — 80061 LIPID PANEL: CPT

## 2019-12-06 PROCEDURE — 36415 COLL VENOUS BLD VENIPUNCTURE: CPT | Mod: PO

## 2019-12-08 NOTE — PROGRESS NOTES
Please call patient with lab results.Your cholesterol levels have improved tremendously since previous labs.  Continue medications/supplements including diet and lifestyle modification.  Recheck lipids in six months to one year.

## 2019-12-16 ENCOUNTER — PATIENT OUTREACH (OUTPATIENT)
Dept: OTHER | Facility: OTHER | Age: 54
End: 2019-12-16

## 2019-12-16 DIAGNOSIS — Z79.899 ENCOUNTER FOR LONG-TERM (CURRENT) USE OF MEDICATIONS: ICD-10-CM

## 2019-12-16 DIAGNOSIS — I10 ESSENTIAL HYPERTENSION: Primary | ICD-10-CM

## 2019-12-16 RX ORDER — HYDROCHLOROTHIAZIDE 12.5 MG/1
12.5 TABLET ORAL DAILY
Qty: 30 TABLET | Refills: 5 | Status: SHIPPED | OUTPATIENT
Start: 2019-12-16 | End: 2020-05-04

## 2019-12-16 NOTE — PROGRESS NOTES
Digital Medicine: Health  Follow-Up    Patient is doing good at this time with no questions concerning her BP readings, medication or lifestyle routine. Patient feels confident with her current routine and agreed to set new goals after the holidays. Reminded patient to continue being mindful of her low-sodium dietary habits and she confirms understanding.  Patient requests a refill on her BP medications (hydrochlorothiazide). Will place task for [employer] and f/u after the holidays.    The history is provided by the patient.     Follow Up  Follow-up reason(s): reading review          INTERVENTION(S)  encouragement/support, denied further coaching, denied resources, denied support and denied questions    PLAN  patient verbalizes understanding and continue monitoring      There are no preventive care reminders to display for this patient.    Last 5 Patient Entered Readings                                      Current 30 Day Average: 118/83     Recent Readings 12/13/2019 12/10/2019 12/10/2019 12/9/2019 12/8/2019    SBP (mmHg) 113 134 145 121 117    DBP (mmHg) 75 87 96 93 82    Pulse 88 66 58 67 81                      Diet Screening   She has the following dietary restrictions: low sodium diet      SDOH

## 2019-12-16 NOTE — PROGRESS NOTES
Digital Medicine: Clinician Follow-Up    Called patient to follow up. Patient endorses adherence to medication regimen. Patient denies hypotensive s/sx (lightheadedness, dizziness, nausea, fatigue); patient denies hypertensive s/sx (SOB, CP, severe headaches, changes in vision). Instructed patient to seek medical care if BP > 180/110 and is accompanied by hypertensive s/sx associated, patient confirms understanding. Patient informed health  that she requires a refill for HCTZ 12.5 mg. She states that her doctor increased her dose frequency to daily rather than every other day; she is tolerating the change well at this time.       The history is provided by the patient. No  was used.     Follow Up  Follow-up reason(s): medication change follow-up, routine education and responding to task      Patient started new medication.    Date:  11/25/2019  Is patient tolerating med change?:  Yes    Last 5 Patient Entered Readings                                      Current 30 Day Average: 118/83     Recent Readings 12/13/2019 12/10/2019 12/10/2019 12/9/2019 12/8/2019    SBP (mmHg) 113 134 145 121 117    DBP (mmHg) 75 87 96 93 82    Pulse 88 66 58 67 81        INTERVENTION(S)  reviewed appropriate dose schedule, encouragement/support and goal setting    PLAN  patient verbalizes understanding, additional monitoring needed and continue monitoring    Assessment:  Reviewed recent readings. Per 2017 ACC/ AHA HTN guidelines (goal of BP < 130/80), current 30-day average need to be addressed more throroughly today.     Plan:  Continue current medication regimen. Sent a refill to patient's preferred pharmacy. Reviewed most recent CMP and electrolytes were WNL. I will continue to monitor regularly and will follow-up in~8 weeks, sooner if blood pressure begins to trend upward or downward.       There are no preventive care reminders to display for this patient.    Hypertension Medications              hydroCHLOROthiazide (HYDRODIURIL) 12.5 MG Tab Take 1 tablet (12.5 mg total) by mouth once daily.

## 2020-01-12 DIAGNOSIS — E03.9 ACQUIRED HYPOTHYROIDISM: ICD-10-CM

## 2020-01-12 RX ORDER — THYROID, PORCINE 30 MG/1
TABLET ORAL
Qty: 30 TABLET | Refills: 5 | Status: SHIPPED | OUTPATIENT
Start: 2020-01-12 | End: 2020-06-18 | Stop reason: SDUPTHER

## 2020-01-31 ENCOUNTER — PATIENT OUTREACH (OUTPATIENT)
Dept: OTHER | Facility: OTHER | Age: 55
End: 2020-01-31

## 2020-01-31 NOTE — PROGRESS NOTES
Digital Medicine: Health  Follow-Up    [facility] in [email or web address] when I called but states she is doing good at this time and does not have any questions concerning BP readings, medication or lifestyle routine. Reminded patient of lifestyle influencers on DBP and encouraged her to continue to be mindful of sodium habits. Agreed to f/u in another few weeks to further discuss habits when patient has more time to talk.     The history is provided by the patient.     Follow Up  Follow-up reason(s): reading review      Routine Education Topics: eating patterns      INTERVENTION(S)  recommended diet modifications, encouragement/support, denied further coaching, denied resources, denied support and denied questions    PLAN  patient verbalizes understanding and continue monitoring    There are no preventive care reminders to display for this patient.    Last 5 Patient Entered Readings                                      Current 30 Day Average: 120/84     Recent Readings 1/24/2020 1/21/2020 1/17/2020 1/17/2020 1/13/2020    SBP (mmHg) 126 115 137 142 117    DBP (mmHg) 76 79 90 102 87    Pulse 54 64 69 70 72              Diet Screening   No change to diet.  She has the following dietary restrictions: low sodium diet    Physical Activity Screening   No change to exercise routine.

## 2020-02-03 ENCOUNTER — PATIENT OUTREACH (OUTPATIENT)
Dept: ADMINISTRATIVE | Facility: HOSPITAL | Age: 55
End: 2020-02-03

## 2020-02-10 ENCOUNTER — PATIENT OUTREACH (OUTPATIENT)
Dept: OTHER | Facility: OTHER | Age: 55
End: 2020-02-10

## 2020-02-10 NOTE — PROGRESS NOTES
Digital Medicine: Clinician Follow-Up    Called patient to follow up. Patient endorses adherence to medication regimen. Patient denies hypotensive s/sx (lightheadedness, dizziness, nausea, fatigue); patient denies hypertensive s/sx (SOB, CP, severe headaches, changes in vision). Instructed patient to seek medical care if BP > 180/110 and is accompanied by hypertensive s/sx associated, patient confirms understanding.     She states that she has not noticed any significant changes in her lifestyle modifications. She states that [place] will be [place] of [place] the next 10 days.    The history is provided by the patient. No  was used.     Follow Up  Follow-up reason(s): routine education        Last 5 Patient Entered Readings                                      Current 30 Day Average: 121/86     Recent Readings 2/9/2020 2/7/2020 2/5/2020 1/31/2020 1/24/2020    SBP (mmHg) 127 120 111 113 126    DBP (mmHg) 88 90 80 81 76    Pulse 66 84 76 83 54        INTERVENTION(S)  reviewed appropriate dose schedule, reviewed monitoring technique, encouragement/support and goal setting    PLAN  patient verbalizes understanding and continue monitoring    Assessment:  Reviewed recent readings. Per 2017 ACC/ AHA HTN guidelines (goal of BP < 130/80), current 30-day average need to be addressed more throroughly today.     Plan:  >Continue current medication regimen. In future, may discontinue HCTZ and initiate chlorthalidone 12.5 mg for added DBP lowering effects.  >Reviewed the factors that may cause DBP to be elevated and informed patient to track any decline in sleep quality or increase in sodium consumption.   >Placed patient on hiatus for 10 days.  >I will continue to monitor regularly and will follow-up in ~4 weeks, sooner if blood pressure begins to trend upward or downward.   >Patient denies having questions or concerns. Patient has my contact information and knows to call with any concerns or clinical  changes.        There are no preventive care reminders to display for this patient.    Hypertension Medications             hydroCHLOROthiazide (HYDRODIURIL) 12.5 MG Tab Take 1 tablet (12.5 mg total) by mouth once daily.

## 2020-02-24 NOTE — PROGRESS NOTES
This note is specifically for wellness visit performed today.     WELLNESS EXAM      Patient ID: Viridiana Ross is a 54 y.o. female with  has a past medical history of Acne, Diverticulosis, Encounter for blood transfusion, Hyperlipidemia, Hypertension, and IBS (irritable bowel syndrome).     Chief Complaint:  Encounter for wellness exam    Well Adult Physical: Patient here for a comprehensive physical exam.The patient reports no problems.  Do you take any herbs or supplements that were not prescribed by a doctor?  Yes  Are you taking calcium supplements?  Yes  Are you taking aspirin daily? yes    Hypothyroidism:  Chronic.  Stable.  Patient compliant with [place] Thyroid 30 mg.  Reports that her hair has stop falling out.  Patient interested about stopping thyroid medication.  Lab Results   Component Value Date    TSH 2.133 2019    FREET4 1.08 2019        History:  OBGYN: Dr. Jackelyn Jones- GYN for hormones.   LMP: No LMP recorded. Patient has had a hysterectomy.   MMG:  Up-to-date  PAP:  Not indicated  Colonoscopy:  Up-to-date    Health Maintenance Topics with due status: Not Due       Topic Last Completion Date    Colonoscopy 2013    TETANUS VACCINE 2017    Lipid Panel 2019            ==============================================  History reviewed.   Health Maintenance Due   Topic Date Due    Mammogram  2019       Past Medical History:  Past Medical History:   Diagnosis Date    Acne     Diverticulosis     Encounter for blood transfusion     Hyperlipidemia     Hypertension     IBS (irritable bowel syndrome)      Past Surgical History:   Procedure Laterality Date     SECTION      X3    CYSTOSCOPY W/ RETROGRADES N/A 10/2/2019    Procedure: CYSTOSCOPY, WITH RETROGRADE PYELOGRAM;  Surgeon: Fred Aranda MD;  Location: [facility] OR;  Service: Urology;  Laterality: N/A;    EYE SURGERY      HYSTERECTOMY      TUBAL LIGATION  1994     Review of patient's  allergies indicates:  No Known Allergies  Current Outpatient Medications on File Prior to Visit   Medication Sig Dispense Refill    ARMOUR THYROID 30 mg Tab TAKE 1 TABLET (30 MG TOTAL) BY MOUTH BEFORE BREAKFAST. 30 tablet 5    calcium carbonate (OS-BENITA) 600 mg (1,500 mg) Tab Take 600 mg by mouth once daily.      cholecalciferol, vitamin D3, (D3-50 CHOLECALCIFEROL) 1,250 mcg (50,000 unit) capsule       cyanocobalamin (VITAMIN B-12) 100 MCG tablet       dicyclomine (BENTYL) 10 MG capsule daily as needed.       estradiol 0.05 mg/24 hr td ptsw (VIVELLE-DOT) 0.05 mg/24 hr Place 1 patch onto the skin twice a week.      ezetimibe (ZETIA) 10 mg tablet Take 1 tablet (10 mg total) by mouth once daily. 90 tablet 3    FOLIC ACID ORAL       hydroCHLOROthiazide (HYDRODIURIL) 12.5 MG Tab Take 1 tablet (12.5 mg total) by mouth once daily. 30 tablet 5    multivit,iron,minerals/lutein (CENTRUM SILVER ULTRA WOMEN'S ORAL)       omega-3 acid ethyl esters (LOVAZA) 1 gram capsule Take 2 g by mouth 2 (two) times daily.      Saccharomyces boulardii (FLORASTOR) 250 mg capsule Take 250 mg by mouth once daily.      selenium 100 mcg Tab       testosterone (TESTIM) 50 mg/5 gram (1 %) Gel Apply topically once daily.      VITAMIN K2 ORAL        No current facility-administered medications on file prior to visit.      Social History     Socioeconomic History    Marital status:      Spouse name: Not on file    Number of children: 3    Years of education: Not on file    Highest education level: Not on file   Occupational History    Not on file   Social Needs    Financial resource strain: Not hard at all    Food insecurity:     Worry: Never true     Inability: Never true    Transportation needs:     Medical: No     Non-medical: No   Tobacco Use    Smoking status: Never Smoker    Smokeless tobacco: Never Used   Substance and Sexual Activity    Alcohol use: Yes     Alcohol/week: 3.0 standard drinks     Types: 1 Glasses  of wine, 2 Standard drinks or equivalent per week     Frequency: Never     Comment: daily    Drug use: Never    Sexual activity: Yes     Partners: Male     Birth control/protection: Post-menopausal   Lifestyle    Physical activity:     Days per week: 5 days     Minutes per session: 60 min    Stress: Only a little   Relationships    Social connections:     Talks on phone: More than three times a week     Gets together: Three times a week     Attends [employer] service: 1 to 4 times per year     Active member of club or organization: No     Attends meetings of clubs or organizations: Never     Relationship status:    Other Topics Concern    Not on file   Social History Narrative    Not on file     Family History   Problem Relation Age of Onset    Hyperlipidemia Mother     Hypertension Mother     Diabetes Mother     Cancer Mother     Diverticulitis Mother     Alzheimer's disease Mother     Depression Mother     Hearing loss Mother     Heart disease Mother     Hypertension Father     Hyperlipidemia Father     Stroke Father     Hearing loss Father     Heart disease Father     Diverticulitis Sister     Cancer Brother     Cancer Brother     Cancer Brother         Nasopharyngeal - bladder          Review of Systems   Constitutional: Negative for chills, fatigue, fever and unexpected weight change.   HENT: Negative for ear pain and sore throat.    Eyes: Negative for redness and visual disturbance.   Respiratory: Negative for cough and shortness of breath.    Cardiovascular: Negative for chest pain and palpitations.   Gastrointestinal: Negative for nausea and vomiting.   Musculoskeletal: Negative for arthralgias and myalgias.   Skin: Negative for rash and wound.   Neurological: Negative for weakness and headaches.         Objective:    Nursing note and vitals reviewed.  Vitals:    02/25/20 0700   BP: 116/79   Pulse: (!) 57   Temp: 97.9 °F (36.6 °C)     Body mass index is 21.53 kg/m².      Physical Exam   Constitutional: SHE is oriented to person, place, and time. She appears well-developed and well-nourished. No distress.   HENT: WNL  Head: Normocephalic and atraumatic.   Eyes: Pupils are equal, round, and reactive to light. EOM are normal.   Neck: Normal range of motion. Neck supple.   Cardiovascular: Normal rate, regular rhythm, normal heart sounds and intact distal pulses.   No murmur heard.  Pulmonary/Chest: Effort normal and breath sounds normal. No respiratory distress. She has no wheezes.   Musculoskeletal: Normal range of motion. She exhibits no edema.   Neurological: She is alert and oriented to person, place, and time. No cranial nerve deficit.   Skin: Skin is warm and dry. Capillary refill takes less than 2 seconds.   Psychiatric: She has a normal mood and affect. Her behavior is normal.           Assessment / Plan:      1.  ANNUAL WELLNESS EXAM -patient here for annual wellness exam.  Labs ordered.  Health maintenance was reviewed and ordered.  Medications were reviewed and reconciled.    Hyperlipidemia:  Improved and at goal.  Continue Zetia.  Check lipid panel.  Hypothyroidism:  Continue [place] Thyroid.  Check TSH.  Patient interested in stopping medication.  Patient advised to recheck labs if she decides to stop medication.  Monitor for symptoms.Discussed hypothyroidism disease course.  Discussed risks and benefits of medication use.  ER precautions.    Complete history and physical was completed today.  Complete and thorough medication reconciliation was performed.  Discussed risks and benefits of medications.  Advised patient on orders and health maintenance.  We discussed old records and old labs if available.  Will request any records not available through epic.  Continue current medications listed on your summary sheet.    All questions were answered. Patient had no further concerns. Advised of Wellness plan. Follow up in 1 year for ANNUAL WELLNESS EXAM    Orders Placed This  Encounter   Procedures    CBC Without Differential     Standing Status:   Future     Standing Expiration Date:   4/25/2021    Comprehensive metabolic panel     Standing Status:   Future     Standing Expiration Date:   4/25/2021    TSH     Standing Status:   Future     Standing Expiration Date:   4/25/2021    Lipid panel     Standing Status:   Future     Standing Expiration Date:   4/25/2021       Medications Ordered This Encounter   Medications    aspirin (ECOTRIN) 81 MG EC tablet     Sig: Take 1 tablet (81 mg total) by mouth once daily.     Dispense:  90 tablet     Refill:  3          Ranjeet Coleman MD

## 2020-02-25 ENCOUNTER — LAB VISIT (OUTPATIENT)
Dept: LAB | Facility: HOSPITAL | Age: 55
End: 2020-02-25
Attending: FAMILY MEDICINE
Payer: COMMERCIAL

## 2020-02-25 ENCOUNTER — PATIENT MESSAGE (OUTPATIENT)
Dept: ADMINISTRATIVE | Facility: OTHER | Age: 55
End: 2020-02-25

## 2020-02-25 ENCOUNTER — OFFICE VISIT (OUTPATIENT)
Dept: FAMILY MEDICINE | Facility: CLINIC | Age: 55
End: 2020-02-25
Payer: COMMERCIAL

## 2020-02-25 VITALS
WEIGHT: 133.38 LBS | HEART RATE: 57 BPM | DIASTOLIC BLOOD PRESSURE: 79 MMHG | BODY MASS INDEX: 21.44 KG/M2 | SYSTOLIC BLOOD PRESSURE: 116 MMHG | HEIGHT: 66 IN | TEMPERATURE: 98 F

## 2020-02-25 DIAGNOSIS — Z13.220 ENCOUNTER FOR LIPID SCREENING FOR CARDIOVASCULAR DISEASE: ICD-10-CM

## 2020-02-25 DIAGNOSIS — Z00.01 ENCOUNTER FOR GENERAL ADULT MEDICAL EXAMINATION WITH ABNORMAL FINDINGS: ICD-10-CM

## 2020-02-25 DIAGNOSIS — E03.9 ACQUIRED HYPOTHYROIDISM: ICD-10-CM

## 2020-02-25 DIAGNOSIS — Z79.899 ENCOUNTER FOR LONG-TERM (CURRENT) USE OF MEDICATIONS: ICD-10-CM

## 2020-02-25 DIAGNOSIS — Z00.01 ENCOUNTER FOR GENERAL ADULT MEDICAL EXAMINATION WITH ABNORMAL FINDINGS: Primary | ICD-10-CM

## 2020-02-25 DIAGNOSIS — Z13.6 ENCOUNTER FOR LIPID SCREENING FOR CARDIOVASCULAR DISEASE: ICD-10-CM

## 2020-02-25 PROBLEM — E78.2 MODERATE MIXED HYPERLIPIDEMIA NOT REQUIRING STATIN THERAPY: Status: ACTIVE | Noted: 2017-06-06

## 2020-02-25 LAB
ALBUMIN SERPL BCP-MCNC: 3.7 G/DL (ref 3.5–5.2)
ALP SERPL-CCNC: 77 U/L (ref 55–135)
ALT SERPL W/O P-5'-P-CCNC: 49 U/L (ref 10–44)
ANION GAP SERPL CALC-SCNC: 7 MMOL/L (ref 8–16)
AST SERPL-CCNC: 27 U/L (ref 10–40)
BILIRUB SERPL-MCNC: 0.6 MG/DL (ref 0.1–1)
BUN SERPL-MCNC: 19 MG/DL (ref 6–20)
CALCIUM SERPL-MCNC: 9.2 MG/DL (ref 8.7–10.5)
CHLORIDE SERPL-SCNC: 106 MMOL/L (ref 95–110)
CHOLEST SERPL-MCNC: 199 MG/DL (ref 120–199)
CHOLEST/HDLC SERPL: 2.4 {RATIO} (ref 2–5)
CO2 SERPL-SCNC: 27 MMOL/L (ref 23–29)
CREAT SERPL-MCNC: 0.8 MG/DL (ref 0.5–1.4)
ERYTHROCYTE [DISTWIDTH] IN BLOOD BY AUTOMATED COUNT: 13.3 % (ref 11.5–14.5)
EST. GFR  (AFRICAN AMERICAN): >60 ML/MIN/1.73 M^2
EST. GFR  (NON AFRICAN AMERICAN): >60 ML/MIN/1.73 M^2
GLUCOSE SERPL-MCNC: 87 MG/DL (ref 70–110)
HCT VFR BLD AUTO: 43.8 % (ref 37–48.5)
HDLC SERPL-MCNC: 82 MG/DL (ref 40–75)
HDLC SERPL: 41.2 % (ref 20–50)
HGB BLD-MCNC: 13.5 G/DL (ref 12–16)
LDLC SERPL CALC-MCNC: 109.2 MG/DL (ref 63–159)
MCH RBC QN AUTO: 30.5 PG (ref 27–31)
MCHC RBC AUTO-ENTMCNC: 30.8 G/DL (ref 32–36)
MCV RBC AUTO: 99 FL (ref 82–98)
NONHDLC SERPL-MCNC: 117 MG/DL
PLATELET # BLD AUTO: 218 K/UL (ref 150–350)
PMV BLD AUTO: 10.8 FL (ref 9.2–12.9)
POTASSIUM SERPL-SCNC: 3.8 MMOL/L (ref 3.5–5.1)
PROT SERPL-MCNC: 6.6 G/DL (ref 6–8.4)
RBC # BLD AUTO: 4.42 M/UL (ref 4–5.4)
SODIUM SERPL-SCNC: 140 MMOL/L (ref 136–145)
TRIGL SERPL-MCNC: 39 MG/DL (ref 30–150)
TSH SERPL DL<=0.005 MIU/L-ACNC: 1.99 UIU/ML (ref 0.4–4)
WBC # BLD AUTO: 6.04 K/UL (ref 3.9–12.7)

## 2020-02-25 PROCEDURE — 3078F PR MOST RECENT DIASTOLIC BLOOD PRESSURE < 80 MM HG: ICD-10-PCS | Mod: CPTII,S$GLB,, | Performed by: FAMILY MEDICINE

## 2020-02-25 PROCEDURE — 3074F PR MOST RECENT SYSTOLIC BLOOD PRESSURE < 130 MM HG: ICD-10-PCS | Mod: CPTII,S$GLB,, | Performed by: FAMILY MEDICINE

## 2020-02-25 PROCEDURE — 99396 PREV VISIT EST AGE 40-64: CPT | Mod: S$GLB,,, | Performed by: FAMILY MEDICINE

## 2020-02-25 PROCEDURE — 85027 COMPLETE CBC AUTOMATED: CPT

## 2020-02-25 PROCEDURE — 80061 LIPID PANEL: CPT

## 2020-02-25 PROCEDURE — 84443 ASSAY THYROID STIM HORMONE: CPT

## 2020-02-25 PROCEDURE — 3074F SYST BP LT 130 MM HG: CPT | Mod: CPTII,S$GLB,, | Performed by: FAMILY MEDICINE

## 2020-02-25 PROCEDURE — 99999 PR PBB SHADOW E&M-EST. PATIENT-LVL III: ICD-10-PCS | Mod: PBBFAC,,, | Performed by: FAMILY MEDICINE

## 2020-02-25 PROCEDURE — 3078F DIAST BP <80 MM HG: CPT | Mod: CPTII,S$GLB,, | Performed by: FAMILY MEDICINE

## 2020-02-25 PROCEDURE — 99396 PR PREVENTIVE VISIT,EST,40-64: ICD-10-PCS | Mod: S$GLB,,, | Performed by: FAMILY MEDICINE

## 2020-02-25 PROCEDURE — 99999 PR PBB SHADOW E&M-EST. PATIENT-LVL III: CPT | Mod: PBBFAC,,, | Performed by: FAMILY MEDICINE

## 2020-02-25 PROCEDURE — 36415 COLL VENOUS BLD VENIPUNCTURE: CPT | Mod: PO

## 2020-02-25 PROCEDURE — 80053 COMPREHEN METABOLIC PANEL: CPT

## 2020-02-25 RX ORDER — PNV NO.95/FERROUS FUM/FOLIC AC 28MG-0.8MG
TABLET ORAL
COMMUNITY
Start: 2020-01-01 | End: 2020-12-18

## 2020-02-25 RX ORDER — ASPIRIN 81 MG/1
81 TABLET ORAL DAILY
Qty: 90 TABLET | Refills: 3 | Status: SHIPPED | OUTPATIENT
Start: 2020-02-25 | End: 2023-05-18

## 2020-02-25 RX ORDER — SELENIUM 100 MCG
TABLET ORAL
COMMUNITY
Start: 2020-01-01 | End: 2020-12-18

## 2020-02-25 RX ORDER — ASPIRIN 325 MG
TABLET, DELAYED RELEASE (ENTERIC COATED) ORAL
COMMUNITY
Start: 2020-01-01 | End: 2020-12-18

## 2020-02-25 NOTE — PATIENT INSTRUCTIONS
Follow up in about 1 year (around 2/25/2021), or if symptoms worsen or fail to improve, for Annual Wellness Exam.     If no improvement in symptoms or symptoms worsen, please be advised to call MD, follow-up at clinic and/or go to ER if becomes severe.    Ranjeet Coleman M.D.        We Offer TELEHEALTH & Same Day Appointments!   Book your Telehealth appointment with me through my nurse or   Clinic appointments on [employer]!    [address] [place] [address]    Office: [phone]   FAX: [phone]    Check out my Facebook Page and Follow Me at: [email or web address]    Check out my website at [employer] by clicking on: [email or web address]    To Schedule appointments online, go to [employer]: [email or web address]

## 2020-02-26 ENCOUNTER — PATIENT MESSAGE (OUTPATIENT)
Dept: FAMILY MEDICINE | Facility: CLINIC | Age: 55
End: 2020-02-26

## 2020-02-26 NOTE — PROGRESS NOTES
#CALL THE PATIENT# to discuss results/see if they have questions and document verification. Make FU appt if needed.    #Pend me the orders in my interpretation below.#    I have sent a message to them with the interpretation (see below).  See if they have any questions and make a follow-up appointment if not already schedule and if needed.    Also please address any outstanding health maintenance that may be due: Mammogram due on 07/13/2019  ====================================    My interpretation that was sent to them through [employer]:    MINNIE, I have reviewed your recent blood work.     Your complete blood count is stable and within normal limits.    Your metabolic panel which shows your glucose, kidney function, electrolytes, and liver function is within normal limits except for one liver enzyme which is mildly elevated.  Repeat hepatic function test in three months.   Thyroid studies are normal.  Continue current dosing.  Recheck thyroid lab in six months.  Your cholesterol is continuing to improve.  Keep up the great work!    The plan is to repeat hepatic function in three months.  Repeat thyroid function in six months.  Repeat all labs in one year

## 2020-02-27 DIAGNOSIS — R74.8 ELEVATED LIVER ENZYMES: ICD-10-CM

## 2020-02-27 DIAGNOSIS — E03.9 ACQUIRED HYPOTHYROIDISM: Primary | ICD-10-CM

## 2020-03-03 ENCOUNTER — PATIENT OUTREACH (OUTPATIENT)
Dept: OTHER | Facility: OTHER | Age: 55
End: 2020-03-03

## 2020-03-03 ENCOUNTER — PATIENT MESSAGE (OUTPATIENT)
Dept: FAMILY MEDICINE | Facility: CLINIC | Age: 55
End: 2020-03-03

## 2020-03-03 NOTE — PROGRESS NOTES
"Digital Medicine: Health  Follow-Up    Patient is doing good at this time and does not have any questions concerning BP readings, medication or lifestyle routine.  She is back from her trip and has been trying to get back on track.    The history is provided by the patient.     INTERVENTION(S)  encouragement/support, denied further coaching, denied support and denied questions    PLAN  patient verbalizes understanding and continue monitoring    There are no preventive care reminders to display for this patient.    Last 5 Patient Entered Readings                                      Current 30 Day Average: 117/82     Recent Readings 2/27/2020 2/24/2020 2/20/2020 2/9/2020 2/7/2020    SBP (mmHg) 107 105 134 127 120    DBP (mmHg) 80 70 86 88 90    Pulse 86 77 64 66 84            Diet Screening   No change to diet.  She has the following dietary restrictions: low sodium diet and low carb    Patient has been eating "very low carb." She confirms she is also trying to be mindful of her sodium habits - She recently switched from [employer] to '[employer]'.  Encouraged lower sodium seasoning options.    Intervention(s): portion control and reducing seasonings    Assigning the following patient goals: reduce portions, maintain low sodium diet and reduce carbs    Physical Activity Screening   When asked if exercising, patient responded: yes5 day(s) a week.  Her level of intensity when exercising is moderate.    Patient participates in the following activities: walking, yard/housework and swimming/water aerobics    Patient tries to exercise 4-5x/week - walking, swimming biking, etc. Encouraged routine    "

## 2020-03-09 ENCOUNTER — PATIENT OUTREACH (OUTPATIENT)
Dept: OTHER | Facility: OTHER | Age: 55
End: 2020-03-09

## 2020-04-14 ENCOUNTER — PATIENT OUTREACH (OUTPATIENT)
Dept: OTHER | Facility: OTHER | Age: 55
End: 2020-04-14

## 2020-04-14 NOTE — PROGRESS NOTES
Digital Medicine: Health  Follow-Up    Quick check in w/ Ms. Kemp this morning. BP at goal 107/72. Patient is doing good at this time and does not have any questions concerning BP readings, medication or lifestyle routine.    Ms. Kemp confirms social distancing and quarantining due to COVID19 - She is working from home and taking advantage of the extra time to be more active.    The history is provided by the patient.     Follow Up  Follow-up reason(s): reading review      Routine Education Topics: physical activity      INTERVENTION(S)  encouragement/support, denied further coaching, denied support and denied questions    PLAN  patient verbalizes understanding and continue monitoring      There are no preventive care reminders to display for this patient.    Last 5 Patient Entered Readings                                      Current 30 Day Average: 107/72     Recent Readings 4/13/2020 4/5/2020 4/1/2020 3/30/2020 3/28/2020    SBP (mmHg) 108 105 110 116 94    DBP (mmHg) 72 70 72 75 71    Pulse 67 66 68 76 75            Diet Screening   No change to diet.      Physical Activity Screening   When asked if exercising, patient responded: yesHer level of intensity when exercising is low.    Patient participates in the following activities: home videos, walking and dancing    Patient reports staying home allows her to get more exercising in. She is doing walking and at-home videos that she wouldn't normally do in a gym setting.

## 2020-05-04 DIAGNOSIS — I10 ESSENTIAL HYPERTENSION: ICD-10-CM

## 2020-05-04 RX ORDER — HYDROCHLOROTHIAZIDE 12.5 MG/1
TABLET ORAL
Qty: 15 TABLET | Refills: 1 | Status: SHIPPED | OUTPATIENT
Start: 2020-05-04 | End: 2020-05-29 | Stop reason: SDUPTHER

## 2020-05-26 ENCOUNTER — PATIENT MESSAGE (OUTPATIENT)
Dept: FAMILY MEDICINE | Facility: CLINIC | Age: 55
End: 2020-05-26

## 2020-05-26 DIAGNOSIS — Z13.6 ENCOUNTER FOR LIPID SCREENING FOR CARDIOVASCULAR DISEASE: ICD-10-CM

## 2020-05-26 DIAGNOSIS — Z13.220 ENCOUNTER FOR LIPID SCREENING FOR CARDIOVASCULAR DISEASE: ICD-10-CM

## 2020-05-26 DIAGNOSIS — Z79.890 HORMONE REPLACEMENT THERAPY (POSTMENOPAUSAL): Primary | ICD-10-CM

## 2020-05-26 DIAGNOSIS — E78.2 MODERATE MIXED HYPERLIPIDEMIA NOT REQUIRING STATIN THERAPY: ICD-10-CM

## 2020-05-27 ENCOUNTER — PATIENT MESSAGE (OUTPATIENT)
Dept: FAMILY MEDICINE | Facility: CLINIC | Age: 55
End: 2020-05-27

## 2020-05-27 NOTE — TELEPHONE ENCOUNTER
Patient scheduled 05/29/2020 for labs and is requesting the labs her ObGyn requested she have be drawn at the same time.  Patient downloaded the labs requested that have to be placed in epic for the lab to be linked to the 05/29/2020 appointment.  Please advise.   No

## 2020-05-27 NOTE — TELEPHONE ENCOUNTER
I received your message which was reviewed along with the the medication list and allergies that we have below.  Please review it for accuracy to make sure that we have the most recent records on your history.     Based on this, the following orders were placed AND/OR medicines were sent in.     Orders Placed This Encounter   Procedures    Testosterone, free     Standing Status:   Future     Standing Expiration Date:   7/26/2021    Testosterone     Standing Status:   Future     Standing Expiration Date:   7/26/2021    Lipid Panel     Standing Status:   Standing     Number of Occurrences:   99     Standing Expiration Date:   5/22/2040       Medications written and sent at this time include:       Your pharmacy(ies) of choice at this time on record include the list below and any medications would have been sent to the one at the top.    [facility]/pharmacy #[id number] - [place], [place] - [address] [address]  Phone: [phone] Fax: [phone]      Thank you for choosing us as your healthcare provider!  Dr. Ranjeet Coleman    ALLERGY LIST  Review of patient's allergies indicates:  No Known Allergies    MEDICATION LIST  Current Outpatient Medications on File Prior to Visit   Medication Sig Dispense Refill    ARMOUR THYROID 30 mg Tab TAKE 1 TABLET (30 MG TOTAL) BY MOUTH BEFORE BREAKFAST. 30 tablet 5    aspirin (ECOTRIN) 81 MG EC tablet Take 1 tablet (81 mg total) by mouth once daily. 90 tablet 3    calcium carbonate (OS-BENITA) 600 mg (1,500 mg) Tab Take 600 mg by mouth once daily.      cholecalciferol, vitamin D3, (D3-50 CHOLECALCIFEROL) 1,250 mcg (50,000 unit) capsule       cyanocobalamin (VITAMIN B-12) 100 MCG tablet       dicyclomine (BENTYL) 10 MG capsule daily as needed.       estradiol 0.05 mg/24 hr td ptsw (VIVELLE-DOT) 0.05 mg/24 hr Place 1 patch onto the skin twice a week.      ezetimibe (ZETIA) 10 mg tablet Take 1 tablet (10 mg total) by mouth once daily. 90 tablet 3    FOLIC  ACID ORAL       hydroCHLOROthiazide (HYDRODIURIL) 12.5 MG Tab TAKE 1 TABLET BY MOUTH EVERY OTHER DAY 15 tablet 1    multivit,iron,minerals/lutein (CENTRUM SILVER ULTRA WOMEN'S ORAL)       omega-3 acid ethyl esters (LOVAZA) 1 gram capsule Take 2 g by mouth 2 (two) times daily.      Saccharomyces boulardii (FLORASTOR) 250 mg capsule Take 250 mg by mouth once daily.      selenium 100 mcg Tab       testosterone (TESTIM) 50 mg/5 gram (1 %) Gel Apply topically once daily.      VITAMIN K2 ORAL        No current facility-administered medications on file prior to visit.        HEALTH MAINTENANCE THAT IS OVERDUE OR NEEDS TO BE UPDATED ON OUR CHART IS LISTED BELOW.  IF YOU HAVE HAD IT DONE ELSEWHERE, PLEASE SEND US DATES AND RECORDS IF YOU HAVE THEM TO MAKE YOUR CHART ACCURATE.  IF YOU HAVE NOT HAD THESE DONE AND ARE READY FOR US TO SCHEDULE THEM, PLEASE SEND US A MESSAGE.  Health Maintenance Due   Topic Date Due    Mammogram  07/13/2019       DISCLAIMER: This note was compiled by using a speech recognition dictation system and therefore please be aware that typographical / speech recognition errors can and do occur.  Please contact me if you see any errors specifically.    Ranjeet Coleman MD  We Offer Telehealth & Same Day Appointments!   Book your Telehealth appointment with me through my nurse or   Clinic appointments on [employer]!  [phone]     Check out my Facebook Page and Follow Me at: CLICK HERE    Check out my website [email or web address] by clicking on: CLICK HERE    To Schedule appointments online, go to [employer]: CLICK HERE     Location: [email or web address]    [address] [place] [address]    FAX: [phone]

## 2020-05-29 ENCOUNTER — TELEPHONE (OUTPATIENT)
Dept: FAMILY MEDICINE | Facility: CLINIC | Age: 55
End: 2020-05-29

## 2020-05-29 ENCOUNTER — LAB VISIT (OUTPATIENT)
Dept: LAB | Facility: HOSPITAL | Age: 55
End: 2020-05-29
Attending: FAMILY MEDICINE
Payer: COMMERCIAL

## 2020-05-29 ENCOUNTER — PATIENT MESSAGE (OUTPATIENT)
Dept: FAMILY MEDICINE | Facility: CLINIC | Age: 55
End: 2020-05-29

## 2020-05-29 DIAGNOSIS — E78.2 MODERATE MIXED HYPERLIPIDEMIA NOT REQUIRING STATIN THERAPY: ICD-10-CM

## 2020-05-29 DIAGNOSIS — Z13.6 ENCOUNTER FOR LIPID SCREENING FOR CARDIOVASCULAR DISEASE: ICD-10-CM

## 2020-05-29 DIAGNOSIS — R74.8 ELEVATED LIVER ENZYMES: ICD-10-CM

## 2020-05-29 DIAGNOSIS — Z79.890 HORMONE REPLACEMENT THERAPY (POSTMENOPAUSAL): ICD-10-CM

## 2020-05-29 DIAGNOSIS — Z13.220 ENCOUNTER FOR LIPID SCREENING FOR CARDIOVASCULAR DISEASE: ICD-10-CM

## 2020-05-29 LAB
ALBUMIN SERPL BCP-MCNC: 3.6 G/DL (ref 3.5–5.2)
ALP SERPL-CCNC: 61 U/L (ref 55–135)
ALT SERPL W/O P-5'-P-CCNC: 31 U/L (ref 10–44)
AST SERPL-CCNC: 22 U/L (ref 10–40)
BILIRUB DIRECT SERPL-MCNC: 0.2 MG/DL (ref 0.1–0.3)
BILIRUB SERPL-MCNC: 0.4 MG/DL (ref 0.1–1)
CHOLEST SERPL-MCNC: 336 MG/DL (ref 120–199)
CHOLEST/HDLC SERPL: 3.6 {RATIO} (ref 2–5)
HDLC SERPL-MCNC: 94 MG/DL (ref 40–75)
HDLC SERPL: 28 % (ref 20–50)
LDLC SERPL CALC-MCNC: 231.6 MG/DL (ref 63–159)
NONHDLC SERPL-MCNC: 242 MG/DL
PROT SERPL-MCNC: 6.4 G/DL (ref 6–8.4)
TESTOST SERPL-MCNC: 26 NG/DL (ref 5–73)
TRIGL SERPL-MCNC: 52 MG/DL (ref 30–150)

## 2020-05-29 PROCEDURE — 84403 ASSAY OF TOTAL TESTOSTERONE: CPT

## 2020-05-29 PROCEDURE — 80061 LIPID PANEL: CPT

## 2020-05-29 PROCEDURE — 80076 HEPATIC FUNCTION PANEL: CPT

## 2020-05-29 PROCEDURE — 84402 ASSAY OF FREE TESTOSTERONE: CPT

## 2020-05-29 RX ORDER — UBIDECARENONE 30 MG
30 CAPSULE ORAL 3 TIMES DAILY
COMMUNITY
End: 2020-12-18

## 2020-05-29 NOTE — TELEPHONE ENCOUNTER
Received inbasket message from patient asking for medication update to be performed, no longer taking zetia, now taking co q 10 and sea iodine.  Medication list updated with all except sea iodine as it is not found in epic.

## 2020-05-31 NOTE — PROGRESS NOTES
Please CALL patient with results and Document verification.   [phone]    Patient needs lab follow-up appointment    Lipid panel is significantly elevated from previous.  Please make follow-up appointment to discuss in detail.  Hepatic function test are within normal limits.  Testosterone is within normal limits.

## 2020-06-01 LAB — TESTOST FREE SERPL-MCNC: 0.7 PG/ML

## 2020-06-03 ENCOUNTER — PATIENT OUTREACH (OUTPATIENT)
Dept: ADMINISTRATIVE | Facility: HOSPITAL | Age: 55
End: 2020-06-03

## 2020-06-03 NOTE — PROGRESS NOTES
STATIN REPORT: Pt is triggering for statin. Pt currently not taking statin. Last time on statin 2017. Msg sent to MD.

## 2020-06-18 ENCOUNTER — OFFICE VISIT (OUTPATIENT)
Dept: FAMILY MEDICINE | Facility: CLINIC | Age: 55
End: 2020-06-18
Payer: COMMERCIAL

## 2020-06-18 VITALS
SYSTOLIC BLOOD PRESSURE: 110 MMHG | BODY MASS INDEX: 21.21 KG/M2 | HEIGHT: 66 IN | DIASTOLIC BLOOD PRESSURE: 69 MMHG | TEMPERATURE: 98 F | WEIGHT: 132 LBS | HEART RATE: 58 BPM

## 2020-06-18 DIAGNOSIS — E03.9 ACQUIRED HYPOTHYROIDISM: ICD-10-CM

## 2020-06-18 DIAGNOSIS — Z00.01 ENCOUNTER FOR GENERAL ADULT MEDICAL EXAMINATION WITH ABNORMAL FINDINGS: ICD-10-CM

## 2020-06-18 DIAGNOSIS — Z79.899 ENCOUNTER FOR LONG-TERM (CURRENT) USE OF MEDICATIONS: ICD-10-CM

## 2020-06-18 DIAGNOSIS — E78.00 PURE HYPERCHOLESTEROLEMIA: Primary | ICD-10-CM

## 2020-06-18 PROCEDURE — 3078F DIAST BP <80 MM HG: CPT | Mod: CPTII,S$GLB,, | Performed by: FAMILY MEDICINE

## 2020-06-18 PROCEDURE — 3078F PR MOST RECENT DIASTOLIC BLOOD PRESSURE < 80 MM HG: ICD-10-PCS | Mod: CPTII,S$GLB,, | Performed by: FAMILY MEDICINE

## 2020-06-18 PROCEDURE — 99999 PR PBB SHADOW E&M-EST. PATIENT-LVL IV: CPT | Mod: PBBFAC,,, | Performed by: FAMILY MEDICINE

## 2020-06-18 PROCEDURE — 99999 PR PBB SHADOW E&M-EST. PATIENT-LVL IV: ICD-10-PCS | Mod: PBBFAC,,, | Performed by: FAMILY MEDICINE

## 2020-06-18 PROCEDURE — 3008F PR BODY MASS INDEX (BMI) DOCUMENTED: ICD-10-PCS | Mod: CPTII,S$GLB,, | Performed by: FAMILY MEDICINE

## 2020-06-18 PROCEDURE — 3074F PR MOST RECENT SYSTOLIC BLOOD PRESSURE < 130 MM HG: ICD-10-PCS | Mod: CPTII,S$GLB,, | Performed by: FAMILY MEDICINE

## 2020-06-18 PROCEDURE — 99213 PR OFFICE/OUTPT VISIT, EST, LEVL III, 20-29 MIN: ICD-10-PCS | Mod: S$GLB,,, | Performed by: FAMILY MEDICINE

## 2020-06-18 PROCEDURE — 3074F SYST BP LT 130 MM HG: CPT | Mod: CPTII,S$GLB,, | Performed by: FAMILY MEDICINE

## 2020-06-18 PROCEDURE — 99213 OFFICE O/P EST LOW 20 MIN: CPT | Mod: S$GLB,,, | Performed by: FAMILY MEDICINE

## 2020-06-18 PROCEDURE — 3008F BODY MASS INDEX DOCD: CPT | Mod: CPTII,S$GLB,, | Performed by: FAMILY MEDICINE

## 2020-06-18 RX ORDER — THYROID 30 MG/1
TABLET ORAL
Qty: 30 TABLET | Refills: 5 | Status: SHIPPED | OUTPATIENT
Start: 2020-06-18 | End: 2021-02-03

## 2020-06-18 RX ORDER — ATORVASTATIN CALCIUM 20 MG/1
20 TABLET, FILM COATED ORAL DAILY
Qty: 90 TABLET | Refills: 3 | Status: SHIPPED | OUTPATIENT
Start: 2020-06-18 | End: 2020-10-09 | Stop reason: SINTOL

## 2020-06-18 NOTE — PROGRESS NOTES
==========================================================================  Subjective/Assessment/ Plan:      Patient ID: Viridiana Ross is a 55 y.o. female.  has a past medical history of Acne, Acquired hypothyroidism (6/18/2020), Diverticulosis, Encounter for blood transfusion, Hyperlipidemia, Hypertension, and IBS (irritable bowel syndrome).   Chief Complaint: Follow-up and Hyperlipidemia    Problem List Items Addressed This Visit     Pure hypercholesterolemia - Primary (Chronic)    Overview     Lab Results   Component Value Date    CHOL 336 (H) 05/29/2020    CHOL 199 02/25/2020    CHOL 203 (H) 12/06/2019     Lab Results   Component Value Date    HDL 94 (H) 05/29/2020    HDL 82 (H) 02/25/2020    HDL 78 (H) 12/06/2019     Lab Results   Component Value Date    LDLCALC 231.6 (H) 05/29/2020    LDLCALC 109.2 02/25/2020    LDLCALC 117.8 12/06/2019     Lab Results   Component Value Date    TRIG 52 05/29/2020    TRIG 39 02/25/2020    TRIG 36 12/06/2019     Lab Results   Component Value Date    CHOLHDL 28.0 05/29/2020    CHOLHDL 41.2 02/25/2020    CHOLHDL 38.4 12/06/2019     Chronic.  Untreated.  Only using diet and exercise.  The most recent lipid panel was done by her OBGYN on May 31, 2019.  Showing total cholesterol is still elevated 286.  Triglycerides are normal at 82 HDL is therapeutic at 89 LDL is still high at 181.  Patient has strong family history of heart attacks in her family.  Thyroid studies were normal.  Testosterone is within normal limits.  Blood count and metabolic panel within normal limits  ==========================  Patient does not want to take a statin at this time.  But is willing to take Zetia and fish oil and niacin.  Patient is concerned about her strong family history of heart attacks and strokes.  Patient denies any symptoms of chest pain shortness of breath blurry vision dizziness lightheadedness abdominal pain etc.  =======================================================  JUNE 2020:  Lipid  panel is significantly elevated from previous.  Patient has been on statins in the past and had severe muscle aches.  ======================================================         Current Assessment & Plan     June 2020:  Patient could not tolerate Zetia due to diarrhea.  Patient is willing to try atorvastatin again she has previously been on this medication in 2017 but did not take it long.  She has a strong family history of strokes.  Patient did have CT calcium score and follows with Cardiology.  CT calcium score was less than 100.  Patient denies any symptoms chest pain palpitations shortness of breath etc..  Recheck lipids in August.  ======================================================    Rechecking lipids.  Patient has been on Zetia and fish oil.  Patient is considering niacin.  We discussed Repatha as an option if statins do not work out.    Previous plan:  Patient's cholesterol numbers are elevated on keto diet.  However patient does have a favorable pattern a LDL with increased particle size and low LP IR score.  Patient also has low homocystine ESR and CRP numbers.  However with patient's extremely elevated number recommend therapy to reduce this LDL burden.  Patient would also like CT calcium score.  Referring to Cardiology for further evaluation and treatment.  Starting Zetia today.  Starting fish oil and niacin.  Will repeat lipid panel in 3 months.    Patient was advised statin is first-line therapy for reducing LDL at this time.  Also educated about other options such as Repatha.  Patient defers statin therapy at this time.                                Discussed hyperlipidemia disease course.  Discussed the risk of cardiovascular disease, increase stroke and heart attack risk.  Patient voiced understanding and understood the treatment plan. All questions were answered.  Discussed healthy diet and increased need for exercise.           Encounter for long-term (current) use of medications     Overview     06/27/2019 Patient is on CHRONIC long-term drug therapy for managed conditions. See medication list. Reports compliance.  No side effects reported.  Routine lab work is being monitored.  Patient does not need refills today.    =====================================  09/10/2019Update.  Patient here with concerns about her recent lipid panel.  Patient reports compliance with hydrochlorothiazide for hypertension and lower extremity edema.  Blood pressure is low normal today.  Patient compliant with other medications as well.  Patient not currently taking anything for cholesterol.  Patient does do KETO diet which may have made her cholesterol worse.  =======================================================  November 2019:  CHRONIC. Stable. Compliant with medications for managed conditions. See medication list. No SE reported.   Routine lab analysis is being monitored. Refills were addressed.  ======================================================  June 2020:  Patient reports diarrhea to Zetia.  Patient stop medication and cholesterol has increased.  See problem.  CHRONIC. Stable. Compliant with medications for managed conditions. See medication list. Routine lab analysis is being monitored. Refills were addressed.  Lab Results   Component Value Date    WBC 6.04 02/25/2020    HGB 13.5 02/25/2020    HCT 43.8 02/25/2020    MCV 99 (H) 02/25/2020     02/25/2020         Chemistry        Component Value Date/Time     02/25/2020 0744    K 3.8 02/25/2020 0744     02/25/2020 0744    CO2 27 02/25/2020 0744    BUN 19 02/25/2020 0744    CREATININE 0.8 02/25/2020 0744    GLU 87 02/25/2020 0744        Component Value Date/Time    CALCIUM 9.2 02/25/2020 0744    ALKPHOS 61 05/29/2020 0816    AST 22 05/29/2020 0816    ALT 31 05/29/2020 0816    BILITOT 0.4 05/29/2020 0816    ESTGFRAFRICA >60.0 02/25/2020 0744    EGFRNONAA >60.0 02/25/2020 0744          Lab Results   Component Value Date    TSH 1.993  "02/25/2020    FREET4 1.08 11/25/2019    T3FREE 2.0 (L) 11/25/2019              Current Assessment & Plan     Reviewed labs.  Thyroid check and lipid check in August.  See medication changes.         Acquired hypothyroidism    Overview     Chronic.  Stable.  Lab Results   Component Value Date    TSH 1.993 02/25/2020    FREET4 1.08 11/25/2019     Patient reports compliance with [place] Thyroid 30 mg.         Current Assessment & Plan     Recheck thyroid labs in August.  Continue [place] Thyroid 30 mg.  Symptoms are controlled at this time.    Discussed hypothyroidism disease course.  Discussed risks and benefits of medication use.  ER precautions.             Other Visit Diagnoses     Encounter for general adult medical examination with abnormal findings             I have reviewed the complete PMH, Family History, social history, surgical history, allergies and medications per Epic record at the time of this visit.  Review of Systems   Constitutional: Negative for activity change and unexpected weight change.   HENT: Negative for hearing loss, rhinorrhea and trouble swallowing.    Eyes: Negative for discharge and visual disturbance.   Respiratory: Negative for chest tightness and wheezing.    Cardiovascular: Negative for chest pain and palpitations.   Gastrointestinal: Negative for blood in stool, constipation, diarrhea and vomiting.   Endocrine: Negative for polydipsia and polyuria.   Genitourinary: Negative for difficulty urinating, dysuria, hematuria and menstrual problem.   Musculoskeletal: Negative for arthralgias, joint swelling and neck pain.   Neurological: Negative for weakness and headaches.   Psychiatric/Behavioral: Negative for confusion and dysphoric mood.    see HPI otherwise negative  Objective   /69   Pulse (!) 58   Temp 97.7 °F (36.5 °C)   Ht 5' 6" (1.676 m)   Wt 59.9 kg (132 lb)   BMI 21.31 kg/m²   Physical Exam  Vitals signs and nursing note reviewed.   Constitutional:       General: She " is not in acute distress.     Appearance: She is well-developed.   HENT:      Head: Normocephalic and atraumatic.   Eyes:      Pupils: Pupils are equal, round, and reactive to light.   Neck:      Musculoskeletal: Normal range of motion and neck supple.   Cardiovascular:      Rate and Rhythm: Normal rate and regular rhythm.      Heart sounds: Normal heart sounds. No murmur.   Pulmonary:      Effort: Pulmonary effort is normal. No respiratory distress.      Breath sounds: Normal breath sounds. No wheezing.   Musculoskeletal: Normal range of motion.   Skin:     General: Skin is warm and dry.      Capillary Refill: Capillary refill takes less than 2 seconds.   Neurological:      Mental Status: She is alert and oriented to person, place, and time.      Cranial Nerves: No cranial nerve deficit.   Psychiatric:         Behavior: Behavior normal.       Assessment:     1. Pure hypercholesterolemia    2. Encounter for long-term (current) use of medications    3. Acquired hypothyroidism    4. Encounter for general adult medical examination with abnormal findings      I have signed for the following orders AND/OR meds.  Orders Placed This Encounter   Procedures    Lipid Panel     Standing Status:   Standing     Number of Occurrences:   99     Standing Expiration Date:   6/13/2040    Comprehensive metabolic panel     Standing Status:   Standing     Number of Occurrences:   99     Standing Expiration Date:   6/13/2040     Medications Ordered This Encounter   Medications    atorvastatin (LIPITOR) 20 MG tablet     Sig: Take 1 tablet (20 mg total) by mouth once daily.     Dispense:  90 tablet     Refill:  3    thyroid, pork, (ARMOUR THYROID) 30 mg Tab     Sig: TAKE 1 TABLET (30 MG TOTAL) BY MOUTH BEFORE BREAKFAST.     Dispense:  30 tablet     Refill:  5      Medication List with Changes/Refills   New Medications    ATORVASTATIN (LIPITOR) 20 MG TABLET    Take 1 tablet (20 mg total) by mouth once daily.   Current Medications     ASPIRIN (ECOTRIN) 81 MG EC TABLET    Take 1 tablet (81 mg total) by mouth once daily.    CALCIUM CARBONATE (OS-BENITA) 600 MG (1,500 MG) TAB    Take 600 mg by mouth once daily.    CHOLECALCIFEROL, VITAMIN D3, (D3-50 CHOLECALCIFEROL) 1,250 MCG (50,000 UNIT) CAPSULE        CO-ENZYME Q-10 30 MG CAPSULE    Take 30 mg by mouth 3 (three) times daily.    CYANOCOBALAMIN (VITAMIN B-12) 100 MCG TABLET        DICYCLOMINE (BENTYL) 10 MG CAPSULE    daily as needed.     ESTRADIOL 0.05 MG/24 HR TD PTSW (VIVELLE-DOT) 0.05 MG/24 HR    Place 1 patch onto the skin twice a week.    FOLIC ACID ORAL        HYDROCHLOROTHIAZIDE (HYDRODIURIL) 12.5 MG TAB    Take 1 tablet (12.5 mg total) by mouth every other day.    MULTIVIT,IRON,MINERALS/LUTEIN (CENTRUM SILVER ULTRA WOMEN'S ORAL)        OMEGA-3 ACID ETHYL ESTERS (LOVAZA) 1 GRAM CAPSULE    Take 2 g by mouth 2 (two) times daily.    SACCHAROMYCES BOULARDII (FLORASTOR) 250 MG CAPSULE    Take 250 mg by mouth once daily.    SELENIUM 100 MCG TAB        TESTOSTERONE (TESTIM) 50 MG/5 GRAM (1 %) GEL    Apply topically once daily.    VITAMIN K2 ORAL       Changed and/or Refilled Medications    Modified Medication Previous Medication    THYROID, PORK, (ARMOUR THYROID) 30 MG TAB ARMOUR THYROID 30 mg Tab       TAKE 1 TABLET (30 MG TOTAL) BY MOUTH BEFORE BREAKFAST.    TAKE 1 TABLET (30 MG TOTAL) BY MOUTH BEFORE BREAKFAST.     No follow-ups on file.  Future Appointments     Date Provider Specialty Appt Notes    8/20/2020  Lab     8/24/2020 Phyllis Jose MD Cardiology 9 month f/u    12/18/2020 Ranjeet Coleman MD Family Medicine follow up per Dr. Coleman    2/25/2021 Ranjeet Coleman MD Family Medicine annual          If no improvement in symptoms or symptoms worsen, advised to call/follow-up at clinic or go to ER. Patient voiced understanding and all questions/concerns were addressed.   DISCLAIMER: This note was compiled by using a speech recognition dictation system and therefore please be aware that  typographical / speech recognition errors can and do occur.  Please contact me if you see any errors specifically.  Ranjeet Coleman M.D.

## 2020-06-18 NOTE — ASSESSMENT & PLAN NOTE
Recheck thyroid labs in August.  Continue [place] Thyroid 30 mg.  Symptoms are controlled at this time.    Discussed hypothyroidism disease course.  Discussed risks and benefits of medication use.  ER precautions.

## 2020-06-18 NOTE — ASSESSMENT & PLAN NOTE
June 2020:  Patient could not tolerate Zetia due to diarrhea.  Patient is willing to try atorvastatin again she has previously been on this medication in 2017 but did not take it long.  She has a strong family history of strokes.  Patient did have CT calcium score and follows with Cardiology.  CT calcium score was less than 100.  Patient denies any symptoms chest pain palpitations shortness of breath etc..  Recheck lipids in August.  ======================================================    Rechecking lipids.  Patient has been on Zetia and fish oil.  Patient is considering niacin.  We discussed Repatha as an option if statins do not work out.    Previous plan:  Patient's cholesterol numbers are elevated on keto diet.  However patient does have a favorable pattern a LDL with increased particle size and low LP IR score.  Patient also has low homocystine ESR and CRP numbers.  However with patient's extremely elevated number recommend therapy to reduce this LDL burden.  Patient would also like CT calcium score.  Referring to Cardiology for further evaluation and treatment.  Starting Zetia today.  Starting fish oil and niacin.  Will repeat lipid panel in 3 months.    Patient was advised statin is first-line therapy for reducing LDL at this time.  Also educated about other options such as Repatha.  Patient defers statin therapy at this time.                                Discussed hyperlipidemia disease course.  Discussed the risk of cardiovascular disease, increase stroke and heart attack risk.  Patient voiced understanding and understood the treatment plan. All questions were answered.  Discussed healthy diet and increased need for exercise.

## 2020-06-23 ENCOUNTER — PATIENT OUTREACH (OUTPATIENT)
Dept: OTHER | Facility: OTHER | Age: 55
End: 2020-06-23

## 2020-08-21 ENCOUNTER — LAB VISIT (OUTPATIENT)
Dept: LAB | Facility: HOSPITAL | Age: 55
End: 2020-08-21
Attending: FAMILY MEDICINE
Payer: COMMERCIAL

## 2020-08-21 ENCOUNTER — PATIENT OUTREACH (OUTPATIENT)
Dept: ADMINISTRATIVE | Facility: OTHER | Age: 55
End: 2020-08-21

## 2020-08-21 DIAGNOSIS — Z13.220 ENCOUNTER FOR LIPID SCREENING FOR CARDIOVASCULAR DISEASE: ICD-10-CM

## 2020-08-21 DIAGNOSIS — E03.9 ACQUIRED HYPOTHYROIDISM: ICD-10-CM

## 2020-08-21 DIAGNOSIS — Z12.31 BREAST CANCER SCREENING BY MAMMOGRAM: Primary | ICD-10-CM

## 2020-08-21 DIAGNOSIS — Z13.6 ENCOUNTER FOR LIPID SCREENING FOR CARDIOVASCULAR DISEASE: ICD-10-CM

## 2020-08-21 DIAGNOSIS — E78.2 MODERATE MIXED HYPERLIPIDEMIA NOT REQUIRING STATIN THERAPY: ICD-10-CM

## 2020-08-21 LAB
CHOLEST SERPL-MCNC: 261 MG/DL (ref 120–199)
CHOLEST/HDLC SERPL: 3.4 {RATIO} (ref 2–5)
HDLC SERPL-MCNC: 77 MG/DL (ref 40–75)
HDLC SERPL: 29.5 % (ref 20–50)
LDLC SERPL CALC-MCNC: 169.6 MG/DL (ref 63–159)
NONHDLC SERPL-MCNC: 184 MG/DL
TRIGL SERPL-MCNC: 72 MG/DL (ref 30–150)
TSH SERPL DL<=0.005 MIU/L-ACNC: 2.11 UIU/ML (ref 0.4–4)

## 2020-08-21 PROCEDURE — 80061 LIPID PANEL: CPT

## 2020-08-21 PROCEDURE — 36415 COLL VENOUS BLD VENIPUNCTURE: CPT | Mod: PO

## 2020-08-21 PROCEDURE — 84443 ASSAY THYROID STIM HORMONE: CPT

## 2020-08-21 NOTE — PROGRESS NOTES
LINKS immunization registry updated  Care Everywhere updated  Health Maintenance updated  DIS/Chart reviewed for overdue Proactive [facility] Encounters (CÉSAR) health maintenance testing (CRS, Breast Ca, Diabetic Eye Exam)   Orders entered:N/A  Portal message sent to patient with scheduling link for mammogram

## 2020-08-23 NOTE — PROGRESS NOTES
Please CALL patient with results and Document verification.   [phone]    TSH remains normal.  But panel has improved significantly over the last two months.    Will discuss in detail of at follow-up office visit.  I would like to see LDL less than 130.  May need to change or increase medication.

## 2020-08-24 ENCOUNTER — OFFICE VISIT (OUTPATIENT)
Dept: CARDIOLOGY | Facility: CLINIC | Age: 55
End: 2020-08-24
Payer: COMMERCIAL

## 2020-08-24 VITALS
WEIGHT: 136.69 LBS | HEART RATE: 62 BPM | DIASTOLIC BLOOD PRESSURE: 74 MMHG | BODY MASS INDEX: 21.97 KG/M2 | SYSTOLIC BLOOD PRESSURE: 110 MMHG | HEIGHT: 66 IN

## 2020-08-24 DIAGNOSIS — E78.00 PURE HYPERCHOLESTEROLEMIA: Chronic | ICD-10-CM

## 2020-08-24 DIAGNOSIS — R93.1 AGATSTON CAC SCORE, <100: ICD-10-CM

## 2020-08-24 DIAGNOSIS — I10 ESSENTIAL HYPERTENSION: Primary | ICD-10-CM

## 2020-08-24 DIAGNOSIS — I36.1 NONRHEUMATIC TRICUSPID VALVE REGURGITATION: ICD-10-CM

## 2020-08-24 PROCEDURE — 3078F DIAST BP <80 MM HG: CPT | Mod: CPTII,S$GLB,, | Performed by: INTERNAL MEDICINE

## 2020-08-24 PROCEDURE — 99999 PR PBB SHADOW E&M-EST. PATIENT-LVL IV: CPT | Mod: PBBFAC,,, | Performed by: INTERNAL MEDICINE

## 2020-08-24 PROCEDURE — 99999 PR PBB SHADOW E&M-EST. PATIENT-LVL IV: ICD-10-PCS | Mod: PBBFAC,,, | Performed by: INTERNAL MEDICINE

## 2020-08-24 PROCEDURE — 3078F PR MOST RECENT DIASTOLIC BLOOD PRESSURE < 80 MM HG: ICD-10-PCS | Mod: CPTII,S$GLB,, | Performed by: INTERNAL MEDICINE

## 2020-08-24 PROCEDURE — 3074F SYST BP LT 130 MM HG: CPT | Mod: CPTII,S$GLB,, | Performed by: INTERNAL MEDICINE

## 2020-08-24 PROCEDURE — 99214 PR OFFICE/OUTPT VISIT, EST, LEVL IV, 30-39 MIN: ICD-10-PCS | Mod: S$GLB,,, | Performed by: INTERNAL MEDICINE

## 2020-08-24 PROCEDURE — 3074F PR MOST RECENT SYSTOLIC BLOOD PRESSURE < 130 MM HG: ICD-10-PCS | Mod: CPTII,S$GLB,, | Performed by: INTERNAL MEDICINE

## 2020-08-24 PROCEDURE — 3008F PR BODY MASS INDEX (BMI) DOCUMENTED: ICD-10-PCS | Mod: CPTII,S$GLB,, | Performed by: INTERNAL MEDICINE

## 2020-08-24 PROCEDURE — 3008F BODY MASS INDEX DOCD: CPT | Mod: CPTII,S$GLB,, | Performed by: INTERNAL MEDICINE

## 2020-08-24 PROCEDURE — 99214 OFFICE O/P EST MOD 30 MIN: CPT | Mod: S$GLB,,, | Performed by: INTERNAL MEDICINE

## 2020-08-24 NOTE — PROGRESS NOTES
Subjective:    Patient ID:  Viridiana Ross is a 55 y.o. female who presents for Hypertension (Labs) and Hyperlipidemia        HPI  DISCUSSED LABS AND GOALS , TSH OK, DIARRHEA WITH ZETIA, WAS OFF LIPITOR FOR A WEEK BEFORE LABS ?? HA, RE-STARTED, NO CHEST PAIN NO TIA TYPE SYMPTOMS NO NEAR-SYNCOPE NO SIGNIFICANT SHORTNESS OF BREATH, SEE ROS    Past Medical History:   Diagnosis Date    Acne     Acquired hypothyroidism 2020    Chronic.  Stable. Lab Results Component Value Date  TSH 1.993 2020  FREET4 1.08 2019  Patient reports compliance with [place] Thyroid 30 mg.    Diverticulosis     Encounter for blood transfusion     Hyperlipidemia     Hypertension     IBS (irritable bowel syndrome)      Past Surgical History:   Procedure Laterality Date     SECTION      X3    CYSTOSCOPY W/ RETROGRADES N/A 10/2/2019    Procedure: CYSTOSCOPY, WITH RETROGRADE PYELOGRAM;  Surgeon: Fred Aranda MD;  Location: [facility] OR;  Service: Urology;  Laterality: N/A;    EYE SURGERY      HYSTERECTOMY      TUBAL LIGATION  1994     Family History   Problem Relation Age of Onset    Hyperlipidemia Mother     Hypertension Mother     Diabetes Mother     Cancer Mother     Diverticulitis Mother     Alzheimer's disease Mother     Depression Mother     Hearing loss Mother     Heart disease Mother     Hypertension Father     Hyperlipidemia Father     Stroke Father     Hearing loss Father     Heart disease Father     Diverticulitis Sister     Cancer Brother     Cancer Brother     Cancer Brother         Nasopharyngeal - bladder     Social History     Socioeconomic History    Marital status:      Spouse name: Not on file    Number of children: 3    Years of education: Not on file    Highest education level: Not on file   Occupational History    Not on file   Social Needs    Financial resource strain: Not hard at all    Food insecurity     Worry: Never true     Inability: Never  true    Transportation needs     Medical: No     Non-medical: No   Tobacco Use    Smoking status: Never Smoker    Smokeless tobacco: Never Used   Substance and Sexual Activity    Alcohol use: Yes     Alcohol/week: 3.0 standard drinks     Types: 1 Glasses of wine, 2 Standard drinks or equivalent per week     Frequency: 4 or more times a week     Drinks per session: 1 or 2     Binge frequency: Never     Comment: daily    Drug use: Never    Sexual activity: Yes     Partners: Male     Birth control/protection: Post-menopausal   Lifestyle    Physical activity     Days per week: 5 days     Minutes per session: 70 min    Stress: Not at all   Relationships    Social connections     Talks on phone: More than three times a week     Gets together: Twice a week     Attends [employer] service: 1 to 4 times per year     Active member of club or organization: Yes     Attends meetings of clubs or organizations: More than 4 times per year     Relationship status:    Other Topics Concern    Not on file   Social History Narrative    Not on file       Review of patient's allergies indicates:   Allergen Reactions    Zetia [ezetimibe] Diarrhea       Current Outpatient Medications:     aspirin (ECOTRIN) 81 MG EC tablet, Take 1 tablet (81 mg total) by mouth once daily., Disp: 90 tablet, Rfl: 3    atorvastatin (LIPITOR) 20 MG tablet, Take 1 tablet (20 mg total) by mouth once daily., Disp: 90 tablet, Rfl: 3    calcium carbonate (OS-BENITA) 600 mg (1,500 mg) Tab, Take 600 mg by mouth once daily., Disp: , Rfl:     cholecalciferol, vitamin D3, (D3-50 CHOLECALCIFEROL) 1,250 mcg (50,000 unit) capsule, , Disp: , Rfl:     co-enzyme Q-10 30 mg capsule, Take 30 mg by mouth 3 (three) times daily., Disp: , Rfl:     cyanocobalamin (VITAMIN B-12) 100 MCG tablet, , Disp: , Rfl:     dicyclomine (BENTYL) 10 MG capsule, daily as needed. , Disp: , Rfl:     FOLIC ACID ORAL, , Disp: , Rfl:     hydroCHLOROthiazide (HYDRODIURIL) 12.5 MG  Tab, TAKE 1 TABLET BY MOUTH EVERY OTHER DAY, Disp: 15 tablet, Rfl: 1    multivit,iron,minerals/lutein (CENTRUM SILVER ULTRA WOMEN'S ORAL), , Disp: , Rfl:     omega-3 acid ethyl esters (LOVAZA) 1 gram capsule, Take 2 g by mouth 2 (two) times daily., Disp: , Rfl:     Saccharomyces boulardii (FLORASTOR) 250 mg capsule, Take 250 mg by mouth once daily., Disp: , Rfl:     selenium 100 mcg Tab, , Disp: , Rfl:     testosterone (TESTIM) 50 mg/5 gram (1 %) Gel, Apply topically once daily., Disp: , Rfl:     thyroid, pork, (ARMOUR THYROID) 30 mg Tab, TAKE 1 TABLET (30 MG TOTAL) BY MOUTH BEFORE BREAKFAST., Disp: 30 tablet, Rfl: 5    VITAMIN K2 ORAL, , Disp: , Rfl:     estradiol 0.05 mg/24 hr td ptsw (VIVELLE-DOT) 0.05 mg/24 hr, Place 1 patch onto the skin twice a week., Disp: , Rfl:     Review of Systems   Constitution: Negative for chills, diaphoresis, fever, malaise/fatigue and night sweats.   HENT: Negative for congestion and nosebleeds.    Eyes: Negative for blurred vision and visual disturbance.   Cardiovascular: Negative for chest pain, claudication, cyanosis, dyspnea on exertion, irregular heartbeat, leg swelling (OCC), near-syncope, orthopnea, palpitations, paroxysmal nocturnal dyspnea and syncope.   Respiratory: Negative for cough, hemoptysis and shortness of breath.    Endocrine: Negative for cold intolerance, heat intolerance and polyuria.   Hematologic/Lymphatic: Negative for adenopathy. Does not bruise/bleed easily.   Skin: Negative for color change, itching and nail changes.   Musculoskeletal: Negative for back pain, falls and joint pain.   Gastrointestinal: Negative for abdominal pain, dysphagia, heartburn, hematemesis, jaundice, melena and vomiting.        IBS   Genitourinary: Negative for dysuria, flank pain and frequency.   Neurological: Negative for brief paralysis, dizziness, focal weakness, light-headedness, loss of balance, numbness, tremors and weakness.   Psychiatric/Behavioral: Negative for  "altered mental status, depression, memory loss and substance abuse. The patient is not nervous/anxious.    Allergic/Immunologic: Negative for environmental allergies, hives and persistent infections.        Objective:      Vitals:    08/24/20 1545   BP: 110/74   Pulse: 62   Weight: 62 kg (136 lb 11 oz)   Height: 5' 6" (1.676 m)   PainSc: 0-No pain     Body mass index is 22.06 kg/m².    Physical Exam   Constitutional: She is oriented to person, place, and time. She appears well-developed and well-nourished. She is active.   HENT:   Head: Normocephalic and atraumatic.   Mouth/Throat: Oropharynx is clear and moist and mucous membranes are normal.   Eyes: Pupils are equal, round, and reactive to light. Conjunctivae and EOM are normal.   Neck: Trachea normal and normal range of motion. Neck supple. Normal carotid pulses, no hepatojugular reflux and no JVD present. Carotid bruit is not present. No edema and no erythema present. No thyromegaly present.   Cardiovascular: Normal rate and regular rhythm.  No extrasystoles are present. PMI is not displaced. Exam reveals no gallop and no friction rub.   Murmur heard.   Systolic murmur is present with a grade of 1/6 at the lower left sternal border.  Pulses:       Carotid pulses are 2+ on the right side and 2+ on the left side.       Radial pulses are 2+ on the right side and 2+ on the left side.        Femoral pulses are 2+ on the right side and 2+ on the left side.       Dorsalis pedis pulses are 2+ on the right side and 2+ on the left side.        Posterior tibial pulses are 2+ on the right side and 2+ on the left side.   Pulmonary/Chest: Effort normal and breath sounds normal. No tachypnea and no bradypnea. She has no wheezes. She has no rales. She exhibits no tenderness.   Abdominal: Soft. Bowel sounds are normal. There is no hepatosplenomegaly. There is no abdominal tenderness. There is no CVA tenderness.   Musculoskeletal: Normal range of motion.         General: No " tenderness or edema.      Comments: NO ANKLE EDEMA   Lymphadenopathy:     She has no cervical adenopathy.   Neurological: She is alert and oriented to person, place, and time. She has normal strength. She displays no tremor. No cranial nerve deficit.   Skin: Skin is warm and dry. No rash noted. No cyanosis.   Psychiatric: She has a normal mood and affect. Her speech is normal and behavior is normal. Judgment and thought content normal.               ..    Chemistry        Component Value Date/Time     02/25/2020 0744    K 3.8 02/25/2020 0744     02/25/2020 0744    CO2 27 02/25/2020 0744    BUN 19 02/25/2020 0744    CREATININE 0.8 02/25/2020 0744    GLU 87 02/25/2020 0744        Component Value Date/Time    CALCIUM 9.2 02/25/2020 0744    ALKPHOS 61 05/29/2020 0816    AST 22 05/29/2020 0816    ALT 31 05/29/2020 0816    BILITOT 0.4 05/29/2020 0816    ESTGFRAFRICA >60.0 02/25/2020 0744    EGFRNONAA >60.0 02/25/2020 0744            ..  Lab Results   Component Value Date    CHOL 261 (H) 08/21/2020    CHOL 336 (H) 05/29/2020    CHOL 199 02/25/2020     Lab Results   Component Value Date    HDL 77 (H) 08/21/2020    HDL 94 (H) 05/29/2020    HDL 82 (H) 02/25/2020     Lab Results   Component Value Date    LDLCALC 169.6 (H) 08/21/2020    LDLCALC 231.6 (H) 05/29/2020    LDLCALC 109.2 02/25/2020     Lab Results   Component Value Date    TRIG 72 08/21/2020    TRIG 52 05/29/2020    TRIG 39 02/25/2020     Lab Results   Component Value Date    CHOLHDL 29.5 08/21/2020    CHOLHDL 28.0 05/29/2020    CHOLHDL 41.2 02/25/2020     ..  Lab Results   Component Value Date    WBC 6.04 02/25/2020    HGB 13.5 02/25/2020    HCT 43.8 02/25/2020    MCV 99 (H) 02/25/2020     02/25/2020       Test(s) Reviewed  I have reviewed the following in detail:  [] Stress test   [] Angiography   [] Echocardiogram   [x] Labs   [] Other:       Assessment:         ICD-10-CM ICD-9-CM   1. Essential hypertension  I10 401.9   2. Pure  hypercholesterolemia  E78.00 272.0   3. Nonrheumatic tricuspid valve regurgitation  I36.1 424.2   4. Agatston CAC score, <100  R93.1 793.2     Problem List Items Addressed This Visit        Cardiac/Vascular    Pure hypercholesterolemia (Chronic)    Overview     Lab Results   Component Value Date    CHOL 336 (H) 05/29/2020    CHOL 199 02/25/2020    CHOL 203 (H) 12/06/2019     Lab Results   Component Value Date    HDL 94 (H) 05/29/2020    HDL 82 (H) 02/25/2020    HDL 78 (H) 12/06/2019     Lab Results   Component Value Date    LDLCALC 231.6 (H) 05/29/2020    LDLCALC 109.2 02/25/2020    LDLCALC 117.8 12/06/2019     Lab Results   Component Value Date    TRIG 52 05/29/2020    TRIG 39 02/25/2020    TRIG 36 12/06/2019     Lab Results   Component Value Date    CHOLHDL 28.0 05/29/2020    CHOLHDL 41.2 02/25/2020    CHOLHDL 38.4 12/06/2019     Chronic.  Untreated.  Only using diet and exercise.  The most recent lipid panel was done by her OBGYN on May 31, 2019.  Showing total cholesterol is still elevated 286.  Triglycerides are normal at 82 HDL is therapeutic at 89 LDL is still high at 181.  Patient has strong family history of heart attacks in her family.  Thyroid studies were normal.  Testosterone is within normal limits.  Blood count and metabolic panel within normal limits  ==========================  Patient does not want to take a statin at this time.  But is willing to take Zetia and fish oil and niacin.  Patient is concerned about her strong family history of heart attacks and strokes.  Patient denies any symptoms of chest pain shortness of breath blurry vision dizziness lightheadedness abdominal pain etc.  =======================================================  JUNE 2020:  Lipid panel is significantly elevated from previous.  Patient has been on statins in the past and had severe muscle aches.  ======================================================         Relevant Orders    CV Ultrasound Bilateral Doppler Carotid     Essential hypertension - Primary    Overview     Initial HPI:  Chronic.  Stable.  Well controlled.  Has been present for many years.  Taking hydrochlorothiazide.  Reports compliance.  No side effects reported.  Denies any chest pain shortness of breath blurred vision.  Patient does have a strong family history of heart attacks and strokes in the family.    09/10/2019  Patient is compliant with hydrochlorothiazide 25 mg.  Patient has been participating in the digital medicine hypertension program.  Blood pressure is low normal on most occasions.  Patient does have lower extremity edema occasionally which is resolved with the hydrochlorothiazide.  Denies any side effects.  Denies any chest pain shortness of breath blurred vision.  Dizziness or lightheadedness.         Relevant Orders    CV Ultrasound Bilateral Doppler Carotid    Agatston CAC score, <100    Nonrheumatic tricuspid valve regurgitation    Relevant Orders    CV Ultrasound Bilateral Doppler Carotid           Plan:     SCREENING CAROTID US, ALL CV CLINICALLY STABLE, NO ANGINA, NO HF, NO TIA, NO CLINICAL ARRHYTHMIA,CONTINUE CURRENT MEDS, EDUCATION, DIET, EXERCISE, DAILY MEDS RTC IN 1 Y WITH REPEAT LABS      Essential hypertension  -     CV Ultrasound Bilateral Doppler Carotid; Future    Pure hypercholesterolemia  -     CV Ultrasound Bilateral Doppler Carotid; Future    Nonrheumatic tricuspid valve regurgitation  -     CV Ultrasound Bilateral Doppler Carotid; Future    Agatston CAC score, <100    RTC Low level/low impact aerobic exercise 5x's/wk. Heart healthy diet and risk factor modification.    See labs and med orders.    Aerobic exercise 5x's/wk. Heart healthy diet and risk factor modification.    See labs and med orders.

## 2020-08-27 ENCOUNTER — PATIENT OUTREACH (OUTPATIENT)
Dept: ADMINISTRATIVE | Facility: HOSPITAL | Age: 55
End: 2020-08-27

## 2020-08-27 NOTE — PROGRESS NOTES
Working mammo report. Sent a portal message offering to schedule mammo in coordination with her upcoming cards procedure.

## 2020-09-17 ENCOUNTER — PATIENT OUTREACH (OUTPATIENT)
Dept: OTHER | Facility: OTHER | Age: 55
End: 2020-09-17

## 2020-09-25 ENCOUNTER — PATIENT MESSAGE (OUTPATIENT)
Dept: OTHER | Facility: OTHER | Age: 55
End: 2020-09-25

## 2020-09-30 NOTE — PROGRESS NOTES
Digital Medicine: Health  Follow-Up    The history is provided by the patient.             Reason for review: Blood pressure at goal      Additional Follow-up details: Quick check in w/ patient today. Patient feels confident with her BP readings and current lifestyle routine - Plans to continue as he has been.   Patient has my number and knows to call if needed. Will continue to monitor and f/u on progress as scheduled.          Diet-no change to diet    No change to diet.  Patient reports eating or drinking the following: Patient states everything is the same and she is continuing to monitor dietary habits / sodium intake.      Physical Activity-no change to routine  No change to exercise routine.     Medication Adherence-Medication adherence was assessed.      Substance, Sleep, Stress-Not assessed      Continue current diet/physical activity routine.  Instructed to charge device. Confirms charging device every few weeks to avoid low battery       Addressed patient questions and patient has my contact information if needed prior to next outreach. Patient verbalizes understanding.      Explained the importance of self-monitoring and medication adherence. Encouraged the patient to communicate with their health  for lifestyle modifications to help improve or maintain a healthy lifestyle.            There are no preventive care reminders to display for this patient.    Last 5 Patient Entered Readings                                      Current 30 Day Average: 104/72     Recent Readings 9/26/2020 9/19/2020 9/11/2020 9/8/2020 8/30/2020    SBP (mmHg) 111 100 93 113 98    DBP (mmHg) 82 67 67 72 72    Pulse 79 87 79 72 81

## 2020-10-05 ENCOUNTER — TELEPHONE (OUTPATIENT)
Dept: CARDIOLOGY | Facility: CLINIC | Age: 55
End: 2020-10-05

## 2020-10-05 ENCOUNTER — PATIENT MESSAGE (OUTPATIENT)
Dept: CARDIOLOGY | Facility: CLINIC | Age: 55
End: 2020-10-05

## 2020-10-08 ENCOUNTER — PATIENT MESSAGE (OUTPATIENT)
Dept: FAMILY MEDICINE | Facility: CLINIC | Age: 55
End: 2020-10-08

## 2020-10-09 ENCOUNTER — OFFICE VISIT (OUTPATIENT)
Dept: FAMILY MEDICINE | Facility: CLINIC | Age: 55
End: 2020-10-09
Payer: COMMERCIAL

## 2020-10-09 DIAGNOSIS — E03.9 ACQUIRED HYPOTHYROIDISM: ICD-10-CM

## 2020-10-09 DIAGNOSIS — R51.9 NONINTRACTABLE HEADACHE, UNSPECIFIED CHRONICITY PATTERN, UNSPECIFIED HEADACHE TYPE: Primary | ICD-10-CM

## 2020-10-09 DIAGNOSIS — E78.00 PURE HYPERCHOLESTEROLEMIA: Chronic | ICD-10-CM

## 2020-10-09 PROCEDURE — 99213 OFFICE O/P EST LOW 20 MIN: CPT | Mod: 95,,, | Performed by: FAMILY MEDICINE

## 2020-10-09 PROCEDURE — 99213 PR OFFICE/OUTPT VISIT, EST, LEVL III, 20-29 MIN: ICD-10-PCS | Mod: 95,,, | Performed by: FAMILY MEDICINE

## 2020-10-09 RX ORDER — PRAVASTATIN SODIUM 40 MG/1
40 TABLET ORAL DAILY
Qty: 90 TABLET | Refills: 3 | Status: SHIPPED | OUTPATIENT
Start: 2020-10-09 | End: 2021-10-06

## 2020-10-11 PROBLEM — E03.9 ACQUIRED HYPOTHYROIDISM: Chronic | Status: ACTIVE | Noted: 2020-06-18

## 2020-10-11 RX ORDER — TESTOSTERONE 25 MG/2.5G
GEL TRANSDERMAL
COMMUNITY
Start: 2020-09-10 | End: 2021-05-13

## 2020-10-11 RX ORDER — CLINDAMYCIN PHOSPHATE AND BENZOYL PEROXIDE 10; 37.5 MG/G; MG/G
GEL TOPICAL
COMMUNITY
Start: 2020-09-30 | End: 2021-09-21 | Stop reason: SDUPTHER

## 2020-10-11 NOTE — PATIENT INSTRUCTIONS
Follow up in about 3 months (around 1/9/2021), or if symptoms worsen or fail to improve, for Med refills, LAB RESULTS.     If no improvement in symptoms or symptoms worsen, please be advised to call MD, follow-up at clinic and/or go to ER if becomes severe.    Ranjeet Coleman M.D.        We Offer TELEHEALTH & Same Day Appointments!   Book your Telehealth appointment with me through my nurse or   Clinic appointments on [employer] [facility]!    [address] [place] [address]    Office: [phone]   FAX: [phone]    Check out my Facebook Page and Follow Me at: [email or web address]    Check out my website at [employer] [email or web address] clicking on: [email or web address]    To Schedule appointments online, go to [employer]: [email or web address]

## 2020-10-11 NOTE — ASSESSMENT & PLAN NOTE
Stop Lipitor.  Since headaches resolve when not taking this medication.  Trial of pravastatin for her hyperlipidemia.    Discussed condition course and signs and symptoms to expect.  Patient advised take anti-inflammatories and or Tylenol for pain.  ER precautions.  Call MD or follow-up to clinic if not improving or worsening symptoms.

## 2020-10-11 NOTE — ASSESSMENT & PLAN NOTE
October 2020:  Stop atorvastatin due to headaches.  Trial of pravastatin.  ====================================================  June 2020:  Patient could not tolerate Zetia due to diarrhea.  Patient is willing to try atorvastatin again she has previously been on this medication in 2017 but did not take it long.  She has a strong family history of strokes.  Patient did have CT calcium score and follows with Cardiology.  CT calcium score was less than 100.  Patient denies any symptoms chest pain palpitations shortness of breath etc..  Recheck lipids in August.  ======================================================    Rechecking lipids.  Patient has been on Zetia and fish oil.  Patient is considering niacin.  We discussed Repatha as an option if statins do not work out.    Previous plan:  Patient's cholesterol numbers are elevated on keto diet.  However patient does have a favorable pattern a LDL with increased particle size and low LP IR score.  Patient also has low homocystine ESR and CRP numbers.  However with patient's extremely elevated number recommend therapy to reduce this LDL burden.  Patient would also like CT calcium score.  Referring to Cardiology for further evaluation and treatment.  Starting Zetia today.  Starting fish oil and niacin.  Will repeat lipid panel in 3 months.    Patient was advised statin is first-line therapy for reducing LDL at this time.  Also educated about other options such as Repatha.  Patient defers statin therapy at this time.                                Discussed hyperlipidemia disease course.  Discussed the risk of cardiovascular disease, increase stroke and heart attack risk.  Patient voiced understanding and understood the treatment plan. All questions were answered.  Discussed healthy diet and increased need for exercise.

## 2020-10-11 NOTE — ASSESSMENT & PLAN NOTE
October 2020:  Thyroid labs are stable.  Continue current medication regimen.    Previous plan:  Recheck thyroid labs in August.  Continue [place] Thyroid 30 mg.  Symptoms are controlled at this time.    Discussed hypothyroidism disease course.  Discussed risks and benefits of medication use.  ER precautions.

## 2020-10-11 NOTE — PROGRESS NOTES
Primary Care Telemedicine Note    The patient location is:  Patient Home - [place]  The chief complaint leading to consultation is:   Chief Complaint   Patient presents with    Headache      Total time spent with patient:  11 min    Visit type: Virtual visit with synchronous audio only and video  Each patient to whom he or she provides medical services by telemedicine is:  (1) informed of the relationship between the physician and patient and the respective role of any other health care provider with respect to management of the patient; and (2) notified that he or she may decline to receive medical services by telemedicine and may withdraw from such care at any time.  =================================================================  ==========================================================================  Subjective/Assessment/ Plan:      Patient ID: Viridiana Ross is a 55 y.o. female.  has a past medical history of Acne, Acquired hypothyroidism (6/18/2020), Diverticulosis, Encounter for blood transfusion, Hyperlipidemia, Hypertension, and IBS (irritable bowel syndrome).   Chief Complaint: Headache    Problem List Items Addressed This Visit     Pure hypercholesterolemia (Chronic)    Overview     Lab Results   Component Value Date    CHOL 261 (H) 08/21/2020    CHOL 336 (H) 05/29/2020    CHOL 199 02/25/2020     Lab Results   Component Value Date    HDL 77 (H) 08/21/2020    HDL 94 (H) 05/29/2020    HDL 82 (H) 02/25/2020     Lab Results   Component Value Date    LDLCALC 169.6 (H) 08/21/2020    LDLCALC 231.6 (H) 05/29/2020    LDLCALC 109.2 02/25/2020     Lab Results   Component Value Date    TRIG 72 08/21/2020    TRIG 52 05/29/2020    TRIG 39 02/25/2020     Lab Results   Component Value Date    CHOLHDL 29.5 08/21/2020    CHOLHDL 28.0 05/29/2020    CHOLHDL 41.2 02/25/2020     Initial HPI:  Chronic.  Untreated.  Only using diet and exercise.The most recent lipid panel was done by her OBGYN on May 31, 2019.  Showing  total cholesterol is still elevated 286.  Triglycerides are normal at 82 HDL is therapeutic at 89 LDL is still high at 181.  Patient has strong family history of heart attacks in her family.  Thyroid studies were normal.  Testosterone is within normal limits.  Blood count and metabolic panel within normal limits.  ==========================  Patient does not want to take a statin at this time.  But is willing to take Zetia and fish oil and niacin.  Patient is concerned about her strong family history of heart attacks and strokes.  Patient denies any symptoms of chest pain shortness of breath blurry vision dizziness lightheadedness abdominal pain etc.  ======================================  JUNE 2020:  Lipid panel is significantly elevated from previous.  Patient has been on statins in the past and had severe muscle aches.  ==========================================  October 2020:  Patient did retry statin however she noticed that she was having headaches since she started this medication.  Patient tried to stop medication and the headaches went away.         Current Assessment & Plan     October 2020:  Stop atorvastatin due to headaches.  Trial of pravastatin.  ====================================================  June 2020:  Patient could not tolerate Zetia due to diarrhea.  Patient is willing to try atorvastatin again she has previously been on this medication in 2017 but did not take it long.  She has a strong family history of strokes.  Patient did have CT calcium score and follows with Cardiology.  CT calcium score was less than 100.  Patient denies any symptoms chest pain palpitations shortness of breath etc..  Recheck lipids in August.  ======================================================    Rechecking lipids.  Patient has been on Zetia and fish oil.  Patient is considering niacin.  We discussed Repatha as an option if statins do not work out.    Previous plan:  Patient's cholesterol numbers are elevated  on keto diet.  However patient does have a favorable pattern a LDL with increased particle size and low LP IR score.  Patient also has low homocystine ESR and CRP numbers.  However with patient's extremely elevated number recommend therapy to reduce this LDL burden.  Patient would also like CT calcium score.  Referring to Cardiology for further evaluation and treatment.  Starting Zetia today.  Starting fish oil and niacin.  Will repeat lipid panel in 3 months.    Patient was advised statin is first-line therapy for reducing LDL at this time.  Also educated about other options such as Repatha.  Patient defers statin therapy at this time.                                Discussed hyperlipidemia disease course.  Discussed the risk of cardiovascular disease, increase stroke and heart attack risk.  Patient voiced understanding and understood the treatment plan. All questions were answered.  Discussed healthy diet and increased need for exercise.           Acquired hypothyroidism (Chronic)    Overview     Chronic.  Stable.  Lab Results   Component Value Date    TSH 2.111 08/21/2020    FREET4 1.08 11/25/2019     Patient reports compliance with [place] Thyroid 30 mg.         Current Assessment & Plan     October 2020:  Thyroid labs are stable.  Continue current medication regimen.    Previous plan:  Recheck thyroid labs in August.  Continue [place] Thyroid 30 mg.  Symptoms are controlled at this time.    Discussed hypothyroidism disease course.  Discussed risks and benefits of medication use.  ER precautions.           Nonintractable headache - Primary    Overview     NEW PROBLEM.   STARTED IN MARCH  From patient message:    Good morning,  I have been having headaches and dizziness starting 1 hour after I take my medications which last for about 4 - 5 hours.  The headaches have been severe with flashes of light similar to migraines.  I think they might be from the Lipitor.              Current Assessment & Plan     Stop  Lipitor.  Since headaches resolve when not taking this medication.  Trial of pravastatin for her hyperlipidemia.    Discussed condition course and signs and symptoms to expect.  Patient advised take anti-inflammatories and or Tylenol for pain.  ER precautions.  Call MD or follow-up to clinic if not improving or worsening symptoms.                I have reviewed the complete PMH, Family History, social history, surgical history, allergies and medications per Epic record at the time of this visit.  Review of Systems   Constitutional: Negative for activity change and unexpected weight change.   HENT: Negative for hearing loss, rhinorrhea and trouble swallowing.    Eyes: Negative for discharge and visual disturbance.   Respiratory: Negative for chest tightness and wheezing.    Cardiovascular: Negative for chest pain and palpitations.   Gastrointestinal: Negative for blood in stool, constipation, diarrhea and vomiting.   Endocrine: Negative for polydipsia and polyuria.   Genitourinary: Negative for difficulty urinating, dysuria, hematuria and menstrual problem.   Musculoskeletal: Negative for arthralgias, joint swelling and neck pain.   Neurological: Positive for headaches. Negative for weakness.   Psychiatric/Behavioral: Negative for confusion and dysphoric mood.    see HPI otherwise negative  Objective   There were no vitals taken for this visit.  Physical Exam  Constitutional:       General: She is not in acute distress.     Appearance: She is well-developed. She is not ill-appearing, toxic-appearing or diaphoretic.   HENT:      Head: Normocephalic and atraumatic.      Right Ear: Hearing and external ear normal.      Left Ear: Hearing and external ear normal.   Eyes:      General: Lids are normal.      Conjunctiva/sclera: Conjunctivae normal.   Neck:      Musculoskeletal: Normal range of motion.   Pulmonary:      Effort: Pulmonary effort is normal. No respiratory distress.   Musculoskeletal: Normal range of motion.    Skin:     Coloration: Skin is not pale.   Neurological:      Mental Status: She is alert. She is not disoriented.   Psychiatric:         Attention and Perception: She is attentive.         Mood and Affect: Mood is not anxious or depressed.         Speech: Speech is not rapid and pressured or slurred.         Behavior: Behavior normal. Behavior is not agitated, aggressive or hyperactive. Behavior is cooperative.         Thought Content: Thought content normal. Thought content is not paranoid or delusional. Thought content does not include homicidal or suicidal ideation. Thought content does not include homicidal or suicidal plan.         Cognition and Memory: Memory is not impaired.         Judgment: Judgment normal.       Assessment:     1. Nonintractable headache, unspecified chronicity pattern, unspecified headache type    2. Pure hypercholesterolemia    3. Acquired hypothyroidism      I have signed for the following orders AND/OR meds.  Orders Placed This Encounter   Procedures    TSH     Standing Status:   Future     Standing Expiration Date:   12/8/2021    T3, Free     Standing Status:   Future     Standing Expiration Date:   12/8/2021    T4, Free     Standing Status:   Future     Standing Expiration Date:   12/8/2021     Medications Ordered This Encounter   Medications    pravastatin (PRAVACHOL) 40 MG tablet     Sig: Take 1 tablet (40 mg total) by mouth once daily.     Dispense:  90 tablet     Refill:  3      Medication List with Changes/Refills   New Medications    PRAVASTATIN (PRAVACHOL) 40 MG TABLET    Take 1 tablet (40 mg total) by mouth once daily.   Current Medications    ASPIRIN (ECOTRIN) 81 MG EC TABLET    Take 1 tablet (81 mg total) by mouth once daily.    CALCIUM CARBONATE (OS-BENITA) 600 MG (1,500 MG) TAB    Take 600 mg by mouth once daily.    CHOLECALCIFEROL, VITAMIN D3, (D3-50 CHOLECALCIFEROL) 1,250 MCG (50,000 UNIT) CAPSULE        CO-ENZYME Q-10 30 MG CAPSULE    Take 30 mg by mouth 3 (three)  times daily.    CYANOCOBALAMIN (VITAMIN B-12) 100 MCG TABLET        DICYCLOMINE (BENTYL) 10 MG CAPSULE    daily as needed.     ESTRADIOL 0.05 MG/24 HR TD PTSW (VIVELLE-DOT) 0.05 MG/24 HR    Place 1 patch onto the skin twice a week.    FOLIC ACID ORAL        HYDROCHLOROTHIAZIDE (HYDRODIURIL) 12.5 MG TAB    TAKE 1 TABLET BY MOUTH EVERY OTHER DAY    MULTIVIT,IRON,MINERALS/LUTEIN (CENTRUM SILVER ULTRA WOMEN'S ORAL)        OMEGA-3 ACID ETHYL ESTERS (LOVAZA) 1 GRAM CAPSULE    Take 2 g by mouth 2 (two) times daily.    ONEXTON 1.2 %(1 % BASE) -3.75 % GLWP        SACCHAROMYCES BOULARDII (FLORASTOR) 250 MG CAPSULE    Take 250 mg by mouth once daily.    SELENIUM 100 MCG TAB        TESTOSTERONE (TESTIM) 50 MG/5 GRAM (1 %) GEL    Apply topically once daily.    TESTOSTERONE 1 % (25 MG/2.5GRAM) GLPK    apply 0.1ml TO wrist once DAILY AS DIRECTED    THYROID, PORK, (ARMOUR THYROID) 30 MG TAB    TAKE 1 TABLET (30 MG TOTAL) BY MOUTH BEFORE BREAKFAST.    VITAMIN K2 ORAL       Discontinued Medications    ATORVASTATIN (LIPITOR) 20 MG TABLET    Take 1 tablet (20 mg total) by mouth once daily.     Follow up in about 3 months (around 1/9/2021), or if symptoms worsen or fail to improve, for Med refills, LAB RESULTS.  Future Appointments     Date Provider Specialty Appt Notes    10/20/2020  Cardiology     12/18/2020 Ranjeet Coleman MD Family Medicine follow up per Dr. Coleman    2/25/2021 Ranjeet Coleman MD Family Medicine annual    8/27/2021 Phyllis Jose MD Cardiology ANNUAL F/U          If no improvement in symptoms or symptoms worsen, advised to call/follow-up at clinic or go to ER. Patient voiced understanding and all questions/concerns were addressed.   DISCLAIMER: This note was compiled by using a speech recognition dictation system and therefore please be aware that typographical / speech recognition errors can and do occur.  Please contact me if you see any errors specifically.  Ranjeet Coleman M.D.

## 2020-10-20 ENCOUNTER — CLINICAL SUPPORT (OUTPATIENT)
Dept: CARDIOLOGY | Facility: CLINIC | Age: 55
End: 2020-10-20
Attending: INTERNAL MEDICINE
Payer: COMMERCIAL

## 2020-10-20 DIAGNOSIS — I10 ESSENTIAL HYPERTENSION: ICD-10-CM

## 2020-10-20 DIAGNOSIS — E78.00 PURE HYPERCHOLESTEROLEMIA: ICD-10-CM

## 2020-10-20 DIAGNOSIS — I36.1 NONRHEUMATIC TRICUSPID VALVE REGURGITATION: ICD-10-CM

## 2020-10-20 LAB
LEFT CBA DIAS: 27 CM/S
LEFT CBA SYS: 83 CM/S
LEFT CCA DIST DIAS: 27 CM/S
LEFT CCA DIST SYS: 89 CM/S
LEFT CCA MID DIAS: 24 CM/S
LEFT CCA MID SYS: 82 CM/S
LEFT CCA PROX DIAS: 24 CM/S
LEFT CCA PROX SYS: 96 CM/S
LEFT ECA DIAS: 24 CM/S
LEFT ECA SYS: 92 CM/S
LEFT ICA DIST DIAS: 35 CM/S
LEFT ICA DIST SYS: 69 CM/S
LEFT ICA MID DIAS: 47 CM/S
LEFT ICA MID SYS: 84 CM/S
LEFT ICA PROX DIAS: 27 CM/S
LEFT ICA PROX SYS: 79 CM/S
LEFT VERTEBRAL DIAS: 15 CM/S
LEFT VERTEBRAL SYS: 47 CM/S
OHS CV CAROTID RIGHT ICA EDV HIGHEST: 33
OHS CV CAROTID ULTRASOUND LEFT ICA/CCA RATIO: 0.94
OHS CV CAROTID ULTRASOUND RIGHT ICA/CCA RATIO: 0.78
OHS CV PV CAROTID LEFT HIGHEST CCA: 96
OHS CV PV CAROTID LEFT HIGHEST ICA: 84
OHS CV PV CAROTID RIGHT HIGHEST CCA: 89
OHS CV PV CAROTID RIGHT HIGHEST ICA: 69
OHS CV US CAROTID LEFT HIGHEST EDV: 47
RIGHT CBA DIAS: 20 CM/S
RIGHT CBA SYS: 62 CM/S
RIGHT CCA DIST DIAS: 22 CM/S
RIGHT CCA DIST SYS: 89 CM/S
RIGHT CCA MID DIAS: 23 CM/S
RIGHT CCA MID SYS: 87 CM/S
RIGHT CCA PROX DIAS: 14 CM/S
RIGHT CCA PROX SYS: 74 CM/S
RIGHT ECA DIAS: 2 CM/S
RIGHT ECA SYS: 68 CM/S
RIGHT ICA DIST DIAS: 16 CM/S
RIGHT ICA DIST SYS: 54 CM/S
RIGHT ICA MID DIAS: 28 CM/S
RIGHT ICA MID SYS: 61 CM/S
RIGHT ICA PROX DIAS: 33 CM/S
RIGHT ICA PROX SYS: 69 CM/S
RIGHT VERTEBRAL DIAS: 12 CM/S
RIGHT VERTEBRAL SYS: 48 CM/S

## 2020-10-20 PROCEDURE — 93880 CV US DOPPLER CAROTID (CUPID ONLY): ICD-10-PCS | Mod: S$GLB,,, | Performed by: INTERNAL MEDICINE

## 2020-10-20 PROCEDURE — 93880 EXTRACRANIAL BILAT STUDY: CPT | Mod: S$GLB,,, | Performed by: INTERNAL MEDICINE

## 2020-11-17 ENCOUNTER — TELEPHONE (OUTPATIENT)
Dept: ADMINISTRATIVE | Facility: HOSPITAL | Age: 55
End: 2020-11-17

## 2020-12-01 ENCOUNTER — PATIENT OUTREACH (OUTPATIENT)
Dept: OTHER | Facility: OTHER | Age: 55
End: 2020-12-01

## 2020-12-01 NOTE — PROGRESS NOTES
[facility]: Health  Follow-Up    Calling to f/u w/ patient - BP avg 122/84.  Patient is doing well overall and feels confident w/ current BP readings.    Ms. Kemp has my phone number and knows to call if needed. Will continue to monitor BP readings and f/u after the holidays.    The history is provided by the patient.             Reason for review: Blood pressure at goal    Patient needs assistance troubleshooting: patient reminder needed and Ms. Kemp agreed to submit weekly BP readings going forward.      Topics Covered on Call: physical activity and device use            Diet-no change to diet    No change to diet.        Physical Activity-Change  She removed most activity from Her physical activity.    She identified the following barriers to physical activity: daylight savings        Intervention(s): created new goal and provided exercise ideas         Additional physical activity details: Ms. Kemp admits she's having trouble keeping active w/ the time change - States she leaves for work in the dark and gets home from work in the dark. Hard to get motivated. Discussed keeping active while at work (bathroom breaks on a different floor, stairs vs elevator, parking farther away, etc) or using lunch break for a quick walk. Patient appeared interested in these ideas and plans to be mindful and try.       Medication Adherence-Medication adherence was assessed.        Substance, Sleep, Stress-change  stress-assessed  Details:Ms. Kemp indicates stress due to long/busy work schedule  Intervention(s):    Sleep-not assessed  Details:  Intervention(s):    Alcohol -not assessed  Details:  Intervention(s):    Tobacco-Not Assessed  Details:  Intervention(s):          Additional monitoring needed. Ms. Kemp agreed to submit weekly BP readings going forward  Continue current diet/physical activity routine. Ms. Kemp plans to be mindful of incorporating small activity into her daily  routine  Instructed to charge device. Reminded patient to charge battery on BP cuff regularly  Provided patient education.       Addressed patient questions and patient has my contact information if needed prior to next outreach. Patient verbalizes understanding.      Explained the importance of self-monitoring and medication adherence. Encouraged the patient to communicate with their health  for lifestyle modifications to help improve or maintain a healthy lifestyle.               There are no preventive care reminders to display for this patient.      Last 5 Patient Entered Readings                                      Current 30 Day Average: 122/84     Recent Readings 11/28/2020 11/20/2020 11/6/2020 10/29/2020 10/21/2020    SBP (mmHg) 135 120 112 112 113    DBP (mmHg) 85 86 80 84 85    Pulse 79 93 76 87 88

## 2020-12-04 ENCOUNTER — TELEPHONE (OUTPATIENT)
Dept: ADMINISTRATIVE | Facility: HOSPITAL | Age: 55
End: 2020-12-04

## 2020-12-04 NOTE — TELEPHONE ENCOUNTER
Contacted patient to schedule overdue mammogram. Patient states that she completed her mammogram at [facility] with Dr Jackelyn Velazquez. Patient agrees to fill out [facility] form.

## 2020-12-12 ENCOUNTER — PATIENT MESSAGE (OUTPATIENT)
Dept: FAMILY MEDICINE | Facility: CLINIC | Age: 55
End: 2020-12-12

## 2020-12-12 DIAGNOSIS — Z13.220 ENCOUNTER FOR LIPID SCREENING FOR CARDIOVASCULAR DISEASE: ICD-10-CM

## 2020-12-12 DIAGNOSIS — Z13.6 ENCOUNTER FOR LIPID SCREENING FOR CARDIOVASCULAR DISEASE: ICD-10-CM

## 2020-12-12 DIAGNOSIS — Z11.59 ENCOUNTER FOR SCREENING FOR OTHER VIRAL DISEASES: ICD-10-CM

## 2020-12-12 DIAGNOSIS — Z79.899 ENCOUNTER FOR LONG-TERM (CURRENT) USE OF MEDICATIONS: ICD-10-CM

## 2020-12-12 DIAGNOSIS — Z00.00 WELL ADULT EXAM: Primary | ICD-10-CM

## 2020-12-15 ENCOUNTER — LAB VISIT (OUTPATIENT)
Dept: LAB | Facility: HOSPITAL | Age: 55
End: 2020-12-15
Attending: FAMILY MEDICINE
Payer: COMMERCIAL

## 2020-12-15 DIAGNOSIS — Z00.00 WELL ADULT EXAM: ICD-10-CM

## 2020-12-15 DIAGNOSIS — Z00.01 ENCOUNTER FOR GENERAL ADULT MEDICAL EXAMINATION WITH ABNORMAL FINDINGS: ICD-10-CM

## 2020-12-15 DIAGNOSIS — E78.00 PURE HYPERCHOLESTEROLEMIA: ICD-10-CM

## 2020-12-15 DIAGNOSIS — E03.9 ACQUIRED HYPOTHYROIDISM: ICD-10-CM

## 2020-12-15 DIAGNOSIS — Z79.899 ENCOUNTER FOR LONG-TERM (CURRENT) USE OF MEDICATIONS: ICD-10-CM

## 2020-12-15 DIAGNOSIS — Z11.59 ENCOUNTER FOR SCREENING FOR OTHER VIRAL DISEASES: ICD-10-CM

## 2020-12-15 LAB
ALBUMIN SERPL BCP-MCNC: 4.1 G/DL (ref 3.5–5.2)
ALP SERPL-CCNC: 75 U/L (ref 55–135)
ALT SERPL W/O P-5'-P-CCNC: 31 U/L (ref 10–44)
ANION GAP SERPL CALC-SCNC: 10 MMOL/L (ref 8–16)
AST SERPL-CCNC: 22 U/L (ref 10–40)
BILIRUB SERPL-MCNC: 0.6 MG/DL (ref 0.1–1)
BUN SERPL-MCNC: 19 MG/DL (ref 6–20)
CALCIUM SERPL-MCNC: 9.9 MG/DL (ref 8.7–10.5)
CHLORIDE SERPL-SCNC: 106 MMOL/L (ref 95–110)
CHOLEST SERPL-MCNC: 291 MG/DL (ref 120–199)
CHOLEST/HDLC SERPL: 3.3 {RATIO} (ref 2–5)
CO2 SERPL-SCNC: 26 MMOL/L (ref 23–29)
CREAT SERPL-MCNC: 0.8 MG/DL (ref 0.5–1.4)
EST. GFR  (AFRICAN AMERICAN): >60 ML/MIN/1.73 M^2
EST. GFR  (NON AFRICAN AMERICAN): >60 ML/MIN/1.73 M^2
GLUCOSE SERPL-MCNC: 87 MG/DL (ref 70–110)
HDLC SERPL-MCNC: 89 MG/DL (ref 40–75)
HDLC SERPL: 30.6 % (ref 20–50)
HGB BLD-MCNC: 13.8 G/DL (ref 12–16)
LDLC SERPL CALC-MCNC: 190.2 MG/DL (ref 63–159)
NONHDLC SERPL-MCNC: 202 MG/DL
POTASSIUM SERPL-SCNC: 3.7 MMOL/L (ref 3.5–5.1)
PROT SERPL-MCNC: 7.1 G/DL (ref 6–8.4)
SARS-COV-2 IGG SERPLBLD QL IA.RAPID: NEGATIVE
SODIUM SERPL-SCNC: 142 MMOL/L (ref 136–145)
T3FREE SERPL-MCNC: 3.2 PG/ML (ref 2.3–4.2)
T4 FREE SERPL-MCNC: 0.95 NG/DL (ref 0.71–1.51)
TRIGL SERPL-MCNC: 59 MG/DL (ref 30–150)
TSH SERPL DL<=0.005 MIU/L-ACNC: 1.92 UIU/ML (ref 0.4–4)

## 2020-12-15 PROCEDURE — 86769 SARS-COV-2 COVID-19 ANTIBODY: CPT

## 2020-12-15 PROCEDURE — 85018 HEMOGLOBIN: CPT

## 2020-12-15 PROCEDURE — 83036 HEMOGLOBIN GLYCOSYLATED A1C: CPT

## 2020-12-15 PROCEDURE — 80061 LIPID PANEL: CPT

## 2020-12-15 PROCEDURE — 84439 ASSAY OF FREE THYROXINE: CPT

## 2020-12-15 PROCEDURE — 80053 COMPREHEN METABOLIC PANEL: CPT

## 2020-12-15 PROCEDURE — 36415 COLL VENOUS BLD VENIPUNCTURE: CPT | Mod: PO

## 2020-12-15 PROCEDURE — 84481 FREE ASSAY (FT-3): CPT

## 2020-12-15 PROCEDURE — 84443 ASSAY THYROID STIM HORMONE: CPT

## 2020-12-16 ENCOUNTER — PATIENT MESSAGE (OUTPATIENT)
Dept: FAMILY MEDICINE | Facility: CLINIC | Age: 55
End: 2020-12-16

## 2020-12-16 DIAGNOSIS — E03.9 ACQUIRED HYPOTHYROIDISM: Primary | ICD-10-CM

## 2020-12-16 LAB
ESTIMATED AVG GLUCOSE: 100 MG/DL (ref 68–131)
HBA1C MFR BLD HPLC: 5.1 % (ref 4–5.6)

## 2020-12-16 NOTE — PROGRESS NOTES
#CALL THE PATIENT# to discuss results/see if they have questions and document verification. Make FU appt if needed.    #Pend me the orders in my interpretation below.#    I have sent a message to them with the interpretation (see below).  See if they have any questions and make a follow-up appointment if not already schedule and if needed.    Also please address any outstanding health maintenance that may be due: Shingles Vaccine(1 of 2) due on 02/28/2014  Mammogram due on 07/13/2019  Influenza Vaccine(1) due on 08/01/2020  ====================================    My interpretation that was sent to them through [employer]:    MINNIE, I have reviewed your recent blood work.     Your HEMOGLOBIN IS NORMAL.  NO ANEMIA.  Your metabolic panel which shows your glucose, kidney function, electrolytes, and liver function is .   Thyroid studies are NORMAL.  CONTINUE CURRENT DOSAGE OF THYROID REPLACEMENT.  Your cholesterol is ABNORMAL.  BORDERLINE HIGH LDL WITH INCREASED TOTAL CHOLESTEROL.  HOWEVER HDL IS ELEVATED WHICH IS PROTECTIVE.  TRIGLYCERIDES ARE WELL CONTROLLED.  STILL WITH OVERALL CHOLESTEROL RISK RATIO OF LESS THAN 5.0.  WILL DISCUSS IN DETAIL FOLLOW-UP OFFICE VISIT.  Your COVID ANTIBODY TEST IS NEGATIVE.  THEREFORE THERE HAS NOT BEEN AN IMMUNE RESPONSE TO COVID.  Your hemoglobin A1c is NORMAL.  This test is gold standard screening test for diabetes.  It is a measures 3 months of your average blood sugar.    REPEAT ANNUAL WELLNESS LABS IN ONE YEAR.

## 2020-12-17 ENCOUNTER — PATIENT OUTREACH (OUTPATIENT)
Dept: ADMINISTRATIVE | Facility: HOSPITAL | Age: 55
End: 2020-12-17

## 2020-12-17 NOTE — LETTER
AUTHORIZATION FOR RELEASE OF   CONFIDENTIAL INFORMATION      We are seeing Viridiana Ross, date of birth 1965, in the clinic at [facility]. Ranjeet Coleman MD is the patient's PCP. Viridiana Ross has an outstanding lab/procedure at the time we reviewed her chart. In order to help keep her health information updated, she has authorized us to request the following medical record(s):        ( X )  MAMMOGRAM                                      (  )  COLONOSCOPY      (  )  PAP SMEAR                                          (  )  OUTSIDE LAB RESULTS     (  )  DEXA SCAN                                          (  )  EYE EXAM            (  )  FOOT EXAM                                          (  )  ENTIRE RECORD     (  )  OUTSIDE IMMUNIZATIONS                 (  )  _______________         Please fax records to [facility], Brian T Callihan, MD, [phone]     If you have any questions, please contact               Emery TIRADO LPN  Care Coordination Department  [place] [facility]  [phone]                Patient Name: Viridiana Ross  : 1965  Patient Phone #: [phone]

## 2020-12-18 ENCOUNTER — OFFICE VISIT (OUTPATIENT)
Dept: FAMILY MEDICINE | Facility: CLINIC | Age: 55
End: 2020-12-18
Payer: COMMERCIAL

## 2020-12-18 VITALS
DIASTOLIC BLOOD PRESSURE: 77 MMHG | TEMPERATURE: 98 F | HEIGHT: 66 IN | HEART RATE: 74 BPM | WEIGHT: 134.38 LBS | BODY MASS INDEX: 21.6 KG/M2 | SYSTOLIC BLOOD PRESSURE: 126 MMHG

## 2020-12-18 DIAGNOSIS — E55.9 VITAMIN D DEFICIENCY: ICD-10-CM

## 2020-12-18 DIAGNOSIS — E03.9 ACQUIRED HYPOTHYROIDISM: Chronic | ICD-10-CM

## 2020-12-18 DIAGNOSIS — Z79.899 ENCOUNTER FOR LONG-TERM (CURRENT) USE OF MEDICATIONS: ICD-10-CM

## 2020-12-18 DIAGNOSIS — E78.00 PURE HYPERCHOLESTEROLEMIA: Primary | Chronic | ICD-10-CM

## 2020-12-18 PROCEDURE — 3008F PR BODY MASS INDEX (BMI) DOCUMENTED: ICD-10-PCS | Mod: CPTII,S$GLB,, | Performed by: FAMILY MEDICINE

## 2020-12-18 PROCEDURE — 99999 PR PBB SHADOW E&M-EST. PATIENT-LVL IV: ICD-10-PCS | Mod: PBBFAC,,, | Performed by: FAMILY MEDICINE

## 2020-12-18 PROCEDURE — 1126F PR PAIN SEVERITY QUANTIFIED, NO PAIN PRESENT: ICD-10-PCS | Mod: S$GLB,,, | Performed by: FAMILY MEDICINE

## 2020-12-18 PROCEDURE — 3078F PR MOST RECENT DIASTOLIC BLOOD PRESSURE < 80 MM HG: ICD-10-PCS | Mod: CPTII,S$GLB,, | Performed by: FAMILY MEDICINE

## 2020-12-18 PROCEDURE — 3078F DIAST BP <80 MM HG: CPT | Mod: CPTII,S$GLB,, | Performed by: FAMILY MEDICINE

## 2020-12-18 PROCEDURE — 99214 PR OFFICE/OUTPT VISIT, EST, LEVL IV, 30-39 MIN: ICD-10-PCS | Mod: S$GLB,,, | Performed by: FAMILY MEDICINE

## 2020-12-18 PROCEDURE — 3008F BODY MASS INDEX DOCD: CPT | Mod: CPTII,S$GLB,, | Performed by: FAMILY MEDICINE

## 2020-12-18 PROCEDURE — 1126F AMNT PAIN NOTED NONE PRSNT: CPT | Mod: S$GLB,,, | Performed by: FAMILY MEDICINE

## 2020-12-18 PROCEDURE — 3074F SYST BP LT 130 MM HG: CPT | Mod: CPTII,S$GLB,, | Performed by: FAMILY MEDICINE

## 2020-12-18 PROCEDURE — 99214 OFFICE O/P EST MOD 30 MIN: CPT | Mod: S$GLB,,, | Performed by: FAMILY MEDICINE

## 2020-12-18 PROCEDURE — 99999 PR PBB SHADOW E&M-EST. PATIENT-LVL IV: CPT | Mod: PBBFAC,,, | Performed by: FAMILY MEDICINE

## 2020-12-18 PROCEDURE — 3074F PR MOST RECENT SYSTOLIC BLOOD PRESSURE < 130 MM HG: ICD-10-PCS | Mod: CPTII,S$GLB,, | Performed by: FAMILY MEDICINE

## 2020-12-18 RX ORDER — NIACIN 250 MG
250 TABLET ORAL NIGHTLY
Refills: 0 | COMMUNITY
Start: 2020-12-18 | End: 2021-08-27

## 2020-12-18 RX ORDER — GLUCOSAM/CHONDRO/HERB 149/HYAL 750-100 MG
TABLET ORAL
Start: 2020-12-18

## 2020-12-18 NOTE — ASSESSMENT & PLAN NOTE
December 2020:  Restart fish oil.  Start niacin low-dose at bedtime.  Continue pravastatin.  May need to increase if no improvement in lipid panel in three months.  However patient does have a very well controlled total cholesterol HDL ratio which is reassuring.  Continue lifestyle modification with diet and exercise.    October 2020:  Stop atorvastatin due to headaches.  Trial of pravastatin.  ====================================================  June 2020:  Patient could not tolerate Zetia due to diarrhea.  Patient is willing to try atorvastatin again she has previously been on this medication in 2017 but did not take it long.  She has a strong family history of strokes.  Patient did have CT calcium score and follows with Cardiology.  CT calcium score was less than 100.  Patient denies any symptoms chest pain palpitations shortness of breath etc..  Recheck lipids in August.  ======================================================    Rechecking lipids.  Patient has been on Zetia and fish oil.  Patient is considering niacin.  We discussed Repatha as an option if statins do not work out.    Previous plan:  Patient's cholesterol numbers are elevated on keto diet.  However patient does have a favorable pattern a LDL with increased particle size and low LP IR score.  Patient also has low homocystine ESR and CRP numbers.  However with patient's extremely elevated number recommend therapy to reduce this LDL burden.  Patient would also like CT calcium score.  Referring to Cardiology for further evaluation and treatment.  Starting Zetia today.  Starting fish oil and niacin.  Will repeat lipid panel in 3 months.    Patient was advised statin is first-line therapy for reducing LDL at this time.  Also educated about other options such as Repatha.  Patient defers statin therapy at this time.                                Discussed hyperlipidemia disease course.  Discussed the risk of cardiovascular disease, increase stroke and  heart attack risk.  Patient voiced understanding and understood the treatment plan. All questions were answered.  Discussed healthy diet and increased need for exercise.

## 2020-12-18 NOTE — PROGRESS NOTES
PLAN:      Problem List Items Addressed This Visit     Pure hypercholesterolemia - Primary (Chronic)     December 2020:  Restart fish oil.  Start niacin low-dose at bedtime.  Continue pravastatin.  May need to increase if no improvement in lipid panel in three months.  However patient does have a very well controlled total cholesterol HDL ratio which is reassuring.  Continue lifestyle modification with diet and exercise.    October 2020:  Stop atorvastatin due to headaches.  Trial of pravastatin.  ====================================================  June 2020:  Patient could not tolerate Zetia due to diarrhea.  Patient is willing to try atorvastatin again she has previously been on this medication in 2017 but did not take it long.  She has a strong family history of strokes.  Patient did have CT calcium score and follows with Cardiology.  CT calcium score was less than 100.  Patient denies any symptoms chest pain palpitations shortness of breath etc..  Recheck lipids in August.  ======================================================    Rechecking lipids.  Patient has been on Zetia and fish oil.  Patient is considering niacin.  We discussed Repatha as an option if statins do not work out.    Previous plan:  Patient's cholesterol numbers are elevated on keto diet.  However patient does have a favorable pattern a LDL with increased particle size and low LP IR score.  Patient also has low homocystine ESR and CRP numbers.  However with patient's extremely elevated number recommend therapy to reduce this LDL burden.  Patient would also like CT calcium score.  Referring to Cardiology for further evaluation and treatment.  Starting Zetia today.  Starting fish oil and niacin.  Will repeat lipid panel in 3 months.    Patient was advised statin is first-line therapy for reducing LDL at this time.  Also educated about other options such as Repatha.  Patient defers statin therapy at this time.                                 Discussed hyperlipidemia disease course.  Discussed the risk of cardiovascular disease, increase stroke and heart attack risk.  Patient voiced understanding and understood the treatment plan. All questions were answered.  Discussed healthy diet and increased need for exercise.           Relevant Medications    niacin 250 MG Tab    Acquired hypothyroidism (Chronic)     Thyroid levels are stable.Please be advised of hypothyroidism disease course.  We will plan to monitor thyroid labs at routine intervals.  Please be advised of risks and benefits of medication use, see medication insert for complete details.  ER precautions.           Encounter for long-term (current) use of medications     Reviewed labs.Complete history and physical was completed today.  Complete and thorough medication reconciliation was performed.  Discussed risks and benefits of medications.  Advised patient on orders and health maintenance.  We discussed old records and old labs if available.  Will request any records not available through epic.  Continue current medications listed on your summary sheet.           Vitamin D deficiency     Check vitamin-D level.  Continue vitamin-D supplementation.         Relevant Orders    Vitamin D        Future Appointments     Date Provider Specialty Appt Notes    3/18/2021  Lab     8/25/2021 Ranjeet Coleman MD Family Medicine annual    8/27/2021 Phyllis Jose MD Cardiology ANNUAL F/U    12/16/2021  Lab            Medication List with Changes/Refills   New Medications    NIACIN 250 MG TAB    Take 1 tablet (250 mg total) by mouth nightly.    OMEGA 3-DHA-EPA-FISH OIL (FISH OIL) 1,000 MG (120 MG-180 MG) CAP    Take as directed.   Current Medications    ASPIRIN (ECOTRIN) 81 MG EC TABLET    Take 1 tablet (81 mg total) by mouth once daily.    CALCIUM CARBONATE (OS-BENITA) 600 MG (1,500 MG) TAB    Take 600 mg by mouth once daily.    DICYCLOMINE (BENTYL) 10 MG CAPSULE    daily as needed.     HYDROCHLOROTHIAZIDE  (HYDRODIURIL) 12.5 MG TAB    TAKE 1 TABLET BY MOUTH EVERY OTHER DAY    MULTIVIT,IRON,MINERALS/LUTEIN (CENTRUM SILVER ULTRA WOMEN'S ORAL)        ONEXTON 1.2 %(1 % BASE) -3.75 % GLWP        PRAVASTATIN (PRAVACHOL) 40 MG TABLET    Take 1 tablet (40 mg total) by mouth once daily.    SACCHAROMYCES BOULARDII (FLORASTOR) 250 MG CAPSULE    Take 250 mg by mouth once daily.    TESTOSTERONE 1 % (25 MG/2.5GRAM) GLPK    apply 0.1ml TO wrist once DAILY AS DIRECTED    THYROID, PORK, (ARMOUR THYROID) 30 MG TAB    TAKE 1 TABLET (30 MG TOTAL) BY MOUTH BEFORE BREAKFAST.    UNABLE TO FIND    Testosterone 1% Topical Cream (VanPen)   Discontinued Medications    CHOLECALCIFEROL, VITAMIN D3, (D3-50 CHOLECALCIFEROL) 1,250 MCG (50,000 UNIT) CAPSULE        CO-ENZYME Q-10 30 MG CAPSULE    Take 30 mg by mouth 3 (three) times daily.    CYANOCOBALAMIN (VITAMIN B-12) 100 MCG TABLET        ESTRADIOL 0.05 MG/24 HR TD PTSW (VIVELLE-DOT) 0.05 MG/24 HR    Place 1 patch onto the skin twice a week.    FOLIC ACID ORAL        OMEGA-3 ACID ETHYL ESTERS (LOVAZA) 1 GRAM CAPSULE    Take 2 g by mouth 2 (two) times daily.    SELENIUM 100 MCG TAB        TESTOSTERONE (TESTIM) 50 MG/5 GRAM (1 %) GEL    Apply topically once daily.    VITAMIN K2 ORAL           Ranjeet Coleman M.D.     ==========================================================================  Subjective:      Patient ID: Viridiana Ross is a 55 y.o. female.  has a past medical history of Acne, Acquired hypothyroidism (6/18/2020), Diverticulosis, Encounter for blood transfusion, Hyperlipidemia, Hypertension, and IBS (irritable bowel syndrome).     Chief Complaint: Follow-up, Hyperlipidemia, and Vitamin D Deficiency      Problem List Items Addressed This Visit     Pure hypercholesterolemia - Primary (Chronic)    Overview     Lab Results   Component Value Date    CHOL 291 (H) 12/15/2020    CHOL 261 (H) 08/21/2020    CHOL 336 (H) 05/29/2020     Lab Results   Component Value Date    HDL 89 (H)  12/15/2020    HDL 77 (H) 08/21/2020    HDL 94 (H) 05/29/2020     Lab Results   Component Value Date    LDLCALC 190.2 (H) 12/15/2020    LDLCALC 169.6 (H) 08/21/2020    LDLCALC 231.6 (H) 05/29/2020     Lab Results   Component Value Date    TRIG 59 12/15/2020    TRIG 72 08/21/2020    TRIG 52 05/29/2020     Lab Results   Component Value Date    CHOLHDL 30.6 12/15/2020    CHOLHDL 29.5 08/21/2020    CHOLHDL 28.0 05/29/2020     Initial HPI:  Chronic.  Untreated.  Only using diet and exercise.The most recent lipid panel was done by her OBGYN on May 31, 2019.  Showing total cholesterol is still elevated 286.  Triglycerides are normal at 82 HDL is therapeutic at 89 LDL is still high at 181.  Patient has strong family history of heart attacks in her family.  Thyroid studies were normal.  Testosterone is within normal limits.  Blood count and metabolic panel within normal limits.  ==========================  Patient does not want to take a statin at this time.  But is willing to take Zetia and fish oil and niacin.  Patient is concerned about her strong family history of heart attacks and strokes.  Patient denies any symptoms of chest pain shortness of breath blurry vision dizziness lightheadedness abdominal pain etc.  ======================================  JUNE 2020:  Lipid panel is significantly elevated from previous.  Patient has been on statins in the past and had severe muscle aches.  ==========================================  October 2020:  Patient did retry statin however she noticed that she was having headaches since she started this medication.  Patient tried to stop medication and the headaches went away.  ==================================================  DECEMBER 2020:  Lipid panel has worsened over previous three months.  Patient reports compliance with pravastatin 40 mg daily.  She was recently changed to this medication.  No side effects reported.  Patient did stop multiple supplements and estrogen patch  since last visit.  Patient no longer taking fish oil.  =================================================         Current Assessment & Plan     December 2020:  Restart fish oil.  Start niacin low-dose at bedtime.  Continue pravastatin.  May need to increase if no improvement in lipid panel in three months.  However patient does have a very well controlled total cholesterol HDL ratio which is reassuring.  Continue lifestyle modification with diet and exercise.    October 2020:  Stop atorvastatin due to headaches.  Trial of pravastatin.  ====================================================  June 2020:  Patient could not tolerate Zetia due to diarrhea.  Patient is willing to try atorvastatin again she has previously been on this medication in 2017 but did not take it long.  She has a strong family history of strokes.  Patient did have CT calcium score and follows with Cardiology.  CT calcium score was less than 100.  Patient denies any symptoms chest pain palpitations shortness of breath etc..  Recheck lipids in August.  ======================================================    Rechecking lipids.  Patient has been on Zetia and fish oil.  Patient is considering niacin.  We discussed Repatha as an option if statins do not work out.    Previous plan:  Patient's cholesterol numbers are elevated on keto diet.  However patient does have a favorable pattern a LDL with increased particle size and low LP IR score.  Patient also has low homocystine ESR and CRP numbers.  However with patient's extremely elevated number recommend therapy to reduce this LDL burden.  Patient would also like CT calcium score.  Referring to Cardiology for further evaluation and treatment.  Starting Zetia today.  Starting fish oil and niacin.  Will repeat lipid panel in 3 months.    Patient was advised statin is first-line therapy for reducing LDL at this time.  Also educated about other options such as Repatha.  Patient defers statin therapy at this  time.                                Discussed hyperlipidemia disease course.  Discussed the risk of cardiovascular disease, increase stroke and heart attack risk.  Patient voiced understanding and understood the treatment plan. All questions were answered.  Discussed healthy diet and increased need for exercise.           Acquired hypothyroidism (Chronic)    Overview     Chronic.  Stable.  Lab Results   Component Value Date    TSH 1.924 12/15/2020    FREET4 0.95 12/15/2020     Patient reports compliance with [place] Thyroid 30 mg.         Current Assessment & Plan     Thyroid levels are stable.Please be advised of hypothyroidism disease course.  We will plan to monitor thyroid labs at routine intervals.  Please be advised of risks and benefits of medication use, see medication insert for complete details.  ER precautions.           Encounter for long-term (current) use of medications    Overview     06/27/2019 CHRONIC long-term drug therapy for managed conditions. See medication list. Reports compliance.  No side effects reported.  Routine lab work is being monitored.  Patient does not need refills today.   09/10/2019Update.  Patient here with concerns about her recent lipid panel.  Patient reports compliance with hydrochlorothiazide for hypertension and lower extremity edema.  Blood pressure is low normal today.Patient compliant with other medications as well.  Patient not currently taking anything for cholesterol.Patient does do KETO diet which may have made her cholesterol worse.  November 2019:  CHRONIC. Stable. Compliant with medications for managed conditions. See medication list. No SE reported. Routine lab analysis is being monitored. Refills were addressed.  June 2020:  Patient reports diarrhea to Zetia.  Patient stop medication and cholesterol has increased.  See problem.  CHRONIC. Stable. Compliant with medications for managed conditions. See medication list. Routine lab analysis is being monitored.  Refills were addressed.  December 2020:  Patient reports compliance with pravastatin.  See problem.  She has stop several supplements since previous visit.  See med list.  Lab Results   Component Value Date    WBC 6.04 02/25/2020    HGB 13.8 12/15/2020    HCT 43.8 02/25/2020    MCV 99 (H) 02/25/2020     02/25/2020         Chemistry        Component Value Date/Time     12/15/2020 0815    K 3.7 12/15/2020 0815     12/15/2020 0815    CO2 26 12/15/2020 0815    BUN 19 12/15/2020 0815    CREATININE 0.8 12/15/2020 0815    GLU 87 12/15/2020 0815        Component Value Date/Time    CALCIUM 9.9 12/15/2020 0815    ALKPHOS 75 12/15/2020 0815    AST 22 12/15/2020 0815    ALT 31 12/15/2020 0815    BILITOT 0.6 12/15/2020 0815    ESTGFRAFRICA >60.0 12/15/2020 0815    EGFRNONAA >60.0 12/15/2020 0815          Lab Results   Component Value Date    TSH 1.924 12/15/2020    FREET4 0.95 12/15/2020    T3FREE 3.2 12/15/2020              Current Assessment & Plan     Reviewed labs.Complete history and physical was completed today.  Complete and thorough medication reconciliation was performed.  Discussed risks and benefits of medications.  Advised patient on orders and health maintenance.  We discussed old records and old labs if available.  Will request any records not available through epic.  Continue current medications listed on your summary sheet.           Vitamin D deficiency    Overview     06/27/2019Vit d deficiency. Takes vitamin d supplement. No SE reported. Fatigue is slightly improved.   09/10/2019Patient reports taking vitamin-D.  Fatigue is improved.  No side effects reported.  Vitamin-D level is adequate.  Chronic. Vit d deficiency. Takes vitamin d supplement. No SE reported. Fatigue is slightly improved.   Lab Results   Component Value Date    DQICZPFM10AL 47.9 07/26/2019    FWZSXNMC77YG 49 12/09/2016               Current Assessment & Plan     Check vitamin-D level.  Continue vitamin-D supplementation.                 Past Medical History:  Past Medical History:   Diagnosis Date    Acne     Acquired hypothyroidism 2020    Chronic.  Stable. Lab Results Component Value Date  TSH 1.993 2020  FREET4 1.08 2019  Patient reports compliance with [place] Thyroid 30 mg.    Diverticulosis     Encounter for blood transfusion     Hyperlipidemia     Hypertension     IBS (irritable bowel syndrome)      Past Surgical History:   Procedure Laterality Date     SECTION      X3    CYSTOSCOPY W/ RETROGRADES N/A 10/2/2019    Procedure: CYSTOSCOPY, WITH RETROGRADE PYELOGRAM;  Surgeon: Fred Aranda MD;  Location: [facility];  Service: Urology;  Laterality: N/A;    EYE SURGERY      HYSTERECTOMY      TUBAL LIGATION  1994     Review of patient's allergies indicates:   Allergen Reactions    Zetia [ezetimibe] Diarrhea    Atorvastatin      Medication List with Changes/Refills   New Medications    NIACIN 250 MG TAB    Take 1 tablet (250 mg total) by mouth nightly.    OMEGA 3-DHA-EPA-FISH OIL (FISH OIL) 1,000 MG (120 MG-180 MG) CAP    Take as directed.   Current Medications    ASPIRIN (ECOTRIN) 81 MG EC TABLET    Take 1 tablet (81 mg total) by mouth once daily.    CALCIUM CARBONATE (OS-BENITA) 600 MG (1,500 MG) TAB    Take 600 mg by mouth once daily.    DICYCLOMINE (BENTYL) 10 MG CAPSULE    daily as needed.     HYDROCHLOROTHIAZIDE (HYDRODIURIL) 12.5 MG TAB    TAKE 1 TABLET BY MOUTH EVERY OTHER DAY    MULTIVIT,IRON,MINERALS/LUTEIN (CENTRUM SILVER ULTRA WOMEN'S ORAL)        ONEXTON 1.2 %(1 % BASE) -3.75 % GLWP        PRAVASTATIN (PRAVACHOL) 40 MG TABLET    Take 1 tablet (40 mg total) by mouth once daily.    SACCHAROMYCES BOULARDII (FLORASTOR) 250 MG CAPSULE    Take 250 mg by mouth once daily.    TESTOSTERONE 1 % (25 MG/2.5GRAM) GLPK    apply 0.1ml TO wrist once DAILY AS DIRECTED    THYROID, PORK, (ARMOUR THYROID) 30 MG TAB    TAKE 1 TABLET (30 MG TOTAL) BY MOUTH BEFORE BREAKFAST.    UNABLE TO FIND     Testosterone 1% Topical Cream (VanPen)   Discontinued Medications    CHOLECALCIFEROL, VITAMIN D3, (D3-50 CHOLECALCIFEROL) 1,250 MCG (50,000 UNIT) CAPSULE        CO-ENZYME Q-10 30 MG CAPSULE    Take 30 mg by mouth 3 (three) times daily.    CYANOCOBALAMIN (VITAMIN B-12) 100 MCG TABLET        ESTRADIOL 0.05 MG/24 HR TD PTSW (VIVELLE-DOT) 0.05 MG/24 HR    Place 1 patch onto the skin twice a week.    FOLIC ACID ORAL        OMEGA-3 ACID ETHYL ESTERS (LOVAZA) 1 GRAM CAPSULE    Take 2 g by mouth 2 (two) times daily.    SELENIUM 100 MCG TAB        TESTOSTERONE (TESTIM) 50 MG/5 GRAM (1 %) GEL    Apply topically once daily.    VITAMIN K2 ORAL          Social History     Tobacco Use    Smoking status: Never Smoker    Smokeless tobacco: Never Used   Substance Use Topics    Alcohol use: Yes     Alcohol/week: 4.0 standard drinks     Types: 4 Standard drinks or equivalent per week     Frequency: 4 or more times a week     Drinks per session: 1 or 2     Binge frequency: Never     Comment: daily      Family History   Problem Relation Age of Onset    Hyperlipidemia Mother     Hypertension Mother     Diabetes Mother     Cancer Mother         Breast    Diverticulitis Mother     Alzheimer's disease Mother     Depression Mother     Hearing loss Mother     Heart disease Mother     Hypertension Father     Hyperlipidemia Father     Stroke Father     Hearing loss Father     Heart disease Father     Diverticulitis Sister     Cancer Brother     Cancer Brother     Cancer Brother         Nasopharyngeal - bladder       I have reviewed the complete PMH, social history, surgical history, allergies and medications.  As well as family history.    Review of Systems   Constitutional: Negative for activity change and unexpected weight change.   HENT: Negative for hearing loss, rhinorrhea and trouble swallowing.    Eyes: Negative for discharge and visual disturbance.   Respiratory: Negative for chest tightness and wheezing.   "  Cardiovascular: Negative for chest pain and palpitations.   Gastrointestinal: Negative for blood in stool, constipation, diarrhea and vomiting.   Endocrine: Negative for polydipsia and polyuria.   Genitourinary: Negative for difficulty urinating, dysuria, hematuria and menstrual problem.   Musculoskeletal: Negative for arthralgias, joint swelling and neck pain.   Neurological: Negative for weakness and headaches.   Psychiatric/Behavioral: Negative for confusion and dysphoric mood.     Objective:   /77   Pulse 74   Temp 97.5 °F (36.4 °C) (Temporal)   Ht 5' 6" (1.676 m)   Wt 61 kg (134 lb 6.4 oz)   BMI 21.69 kg/m²   Physical Exam  Vitals signs and nursing note reviewed.   Constitutional:       General: She is not in acute distress.     Appearance: She is well-developed.   HENT:      Head: Normocephalic and atraumatic.      Right Ear: External ear normal.      Left Ear: External ear normal.      Nose: Nose normal. No rhinorrhea.   Eyes:      Extraocular Movements: Extraocular movements intact.      Pupils: Pupils are equal, round, and reactive to light.   Neck:      Musculoskeletal: Normal range of motion and neck supple.   Cardiovascular:      Rate and Rhythm: Normal rate.      Pulses: Normal pulses.   Pulmonary:      Effort: Pulmonary effort is normal. No respiratory distress.      Breath sounds: Normal breath sounds.   Musculoskeletal: Normal range of motion.   Skin:     General: Skin is warm and dry.      Capillary Refill: Capillary refill takes less than 2 seconds.   Neurological:      General: No focal deficit present.      Mental Status: She is alert and oriented to person, place, and time.   Psychiatric:         Mood and Affect: Mood normal.         Behavior: Behavior normal.         Assessment:     1. Pure hypercholesterolemia    2. Vitamin D deficiency    3. Acquired hypothyroidism    4. Encounter for long-term (current) use of medications      MDM:   Moderate complexity.  Moderate risk.  I have " Reviewed and summarized old records.  I have performed thorough medication reconciliation today and discussed risk and benefits of each medication.  I have reviewed labs and discussed with patient.  All questions were answered.  I am requesting old records and will review them once they are available.  Cardiology    I have signed for the following orders AND/OR meds.  Orders Placed This Encounter   Procedures    Vitamin D     Standing Status:   Future     Standing Expiration Date:   2/16/2022     Medications Ordered This Encounter   Medications    niacin 250 MG Tab     Sig: Take 1 tablet (250 mg total) by mouth nightly.     Refill:  0    omega 3-dha-epa-fish oil (FISH OIL) 1,000 mg (120 mg-180 mg) Cap     Sig: Take as directed.     Dispense:           Follow up in about 6 months (around 6/18/2021), or if symptoms worsen or fail to improve, for Annual Wellness Exam.    If no improvement in symptoms or symptoms worsen, advised to call/follow-up at clinic or go to ER. Patient voiced understanding and all questions/concerns were addressed.     DISCLAIMER: This note was compiled by using a speech recognition dictation system and therefore please be aware that typographical / speech recognition errors can and do occur.  Please contact me if you see any errors specifically.    Ranjeet Coleman M.D.       Office: [phone] [address] [place] [address]  FAX: [phone]

## 2020-12-18 NOTE — ASSESSMENT & PLAN NOTE
Thyroid levels are stable.Please be advised of hypothyroidism disease course.  We will plan to monitor thyroid labs at routine intervals.  Please be advised of risks and benefits of medication use, see medication insert for complete details.  ER precautions.

## 2020-12-18 NOTE — PATIENT INSTRUCTIONS
Follow up in about 6 months (around 6/18/2021), or if symptoms worsen or fail to improve, for Annual Wellness Exam.     If no improvement in symptoms or symptoms worsen, please be advised to call MD, follow-up at clinic and/or go to ER if becomes severe.    Ranjeet Coleman M.D.        We Offer TELEHEALTH & Same Day Appointments!   Book your Telehealth appointment with me through my nurse or   Clinic appointments on [address]    [address] [place] [address]    Office: [phone]   FAX: [phone]    Check out my Facebook Page and Follow Me at: [email or web address]    Check out my website at [employer] by clicking on: [email or web address]    To Schedule appointments online, go to [email or web address]: [email or web address]

## 2020-12-22 ENCOUNTER — PATIENT MESSAGE (OUTPATIENT)
Dept: ADMINISTRATIVE | Facility: OTHER | Age: 55
End: 2020-12-22

## 2021-01-15 ENCOUNTER — PATIENT MESSAGE (OUTPATIENT)
Dept: ADMINISTRATIVE | Facility: OTHER | Age: 56
End: 2021-01-15

## 2021-03-19 ENCOUNTER — LAB VISIT (OUTPATIENT)
Dept: LAB | Facility: HOSPITAL | Age: 56
End: 2021-03-19
Attending: FAMILY MEDICINE
Payer: COMMERCIAL

## 2021-03-19 DIAGNOSIS — Z79.899 ENCOUNTER FOR LONG-TERM (CURRENT) USE OF MEDICATIONS: ICD-10-CM

## 2021-03-19 DIAGNOSIS — E03.9 ACQUIRED HYPOTHYROIDISM: ICD-10-CM

## 2021-03-19 DIAGNOSIS — Z00.01 ENCOUNTER FOR GENERAL ADULT MEDICAL EXAMINATION WITH ABNORMAL FINDINGS: ICD-10-CM

## 2021-03-19 DIAGNOSIS — E55.9 VITAMIN D DEFICIENCY: ICD-10-CM

## 2021-03-19 DIAGNOSIS — E78.00 PURE HYPERCHOLESTEROLEMIA: ICD-10-CM

## 2021-03-19 LAB
25(OH)D3+25(OH)D2 SERPL-MCNC: 66 NG/ML (ref 30–96)
CHOLEST SERPL-MCNC: 207 MG/DL (ref 120–199)
CHOLEST/HDLC SERPL: 2.3 {RATIO} (ref 2–5)
HDLC SERPL-MCNC: 89 MG/DL (ref 40–75)
HDLC SERPL: 43 % (ref 20–50)
LDLC SERPL CALC-MCNC: 110.2 MG/DL (ref 63–159)
NONHDLC SERPL-MCNC: 118 MG/DL
TRIGL SERPL-MCNC: 39 MG/DL (ref 30–150)
TSH SERPL DL<=0.005 MIU/L-ACNC: 2.26 UIU/ML (ref 0.4–4)

## 2021-03-19 PROCEDURE — 80061 LIPID PANEL: CPT | Performed by: FAMILY MEDICINE

## 2021-03-19 PROCEDURE — 84443 ASSAY THYROID STIM HORMONE: CPT | Performed by: FAMILY MEDICINE

## 2021-03-19 PROCEDURE — 82306 VITAMIN D 25 HYDROXY: CPT | Performed by: FAMILY MEDICINE

## 2021-03-19 PROCEDURE — 36415 COLL VENOUS BLD VENIPUNCTURE: CPT | Mod: PO | Performed by: FAMILY MEDICINE

## 2021-04-20 ENCOUNTER — PATIENT MESSAGE (OUTPATIENT)
Dept: FAMILY MEDICINE | Facility: CLINIC | Age: 56
End: 2021-04-20

## 2021-04-21 ENCOUNTER — OFFICE VISIT (OUTPATIENT)
Dept: FAMILY MEDICINE | Facility: CLINIC | Age: 56
End: 2021-04-21
Payer: COMMERCIAL

## 2021-04-21 ENCOUNTER — LAB VISIT (OUTPATIENT)
Dept: LAB | Facility: HOSPITAL | Age: 56
End: 2021-04-21
Attending: NURSE PRACTITIONER
Payer: COMMERCIAL

## 2021-04-21 VITALS
HEIGHT: 66 IN | BODY MASS INDEX: 22.02 KG/M2 | SYSTOLIC BLOOD PRESSURE: 127 MMHG | HEART RATE: 73 BPM | DIASTOLIC BLOOD PRESSURE: 80 MMHG | TEMPERATURE: 99 F | WEIGHT: 137 LBS

## 2021-04-21 DIAGNOSIS — R59.1 LYMPHADENOPATHY: ICD-10-CM

## 2021-04-21 DIAGNOSIS — J32.9 SINUSITIS, UNSPECIFIED CHRONICITY, UNSPECIFIED LOCATION: ICD-10-CM

## 2021-04-21 DIAGNOSIS — R59.1 LYMPHADENOPATHY: Primary | ICD-10-CM

## 2021-04-21 LAB
BASOPHILS # BLD AUTO: 0.04 K/UL (ref 0–0.2)
BASOPHILS NFR BLD: 0.6 % (ref 0–1.9)
DIFFERENTIAL METHOD: ABNORMAL
EOSINOPHIL # BLD AUTO: 0.1 K/UL (ref 0–0.5)
EOSINOPHIL NFR BLD: 0.7 % (ref 0–8)
ERYTHROCYTE [DISTWIDTH] IN BLOOD BY AUTOMATED COUNT: 13.2 % (ref 11.5–14.5)
HCT VFR BLD AUTO: 45.2 % (ref 37–48.5)
HGB BLD-MCNC: 14.6 G/DL (ref 12–16)
IMM GRANULOCYTES # BLD AUTO: 0.04 K/UL (ref 0–0.04)
IMM GRANULOCYTES NFR BLD AUTO: 0.6 % (ref 0–0.5)
LYMPHOCYTES # BLD AUTO: 1.7 K/UL (ref 1–4.8)
LYMPHOCYTES NFR BLD: 23.6 % (ref 18–48)
MCH RBC QN AUTO: 30.2 PG (ref 27–31)
MCHC RBC AUTO-ENTMCNC: 32.3 G/DL (ref 32–36)
MCV RBC AUTO: 94 FL (ref 82–98)
MONOCYTES # BLD AUTO: 0.5 K/UL (ref 0.3–1)
MONOCYTES NFR BLD: 7.5 % (ref 4–15)
NEUTROPHILS # BLD AUTO: 4.8 K/UL (ref 1.8–7.7)
NEUTROPHILS NFR BLD: 67 % (ref 38–73)
NRBC BLD-RTO: 0 /100 WBC
PLATELET # BLD AUTO: 204 K/UL (ref 150–450)
PMV BLD AUTO: 11 FL (ref 9.2–12.9)
RBC # BLD AUTO: 4.83 M/UL (ref 4–5.4)
WBC # BLD AUTO: 7.2 K/UL (ref 3.9–12.7)

## 2021-04-21 PROCEDURE — 1126F PR PAIN SEVERITY QUANTIFIED, NO PAIN PRESENT: ICD-10-PCS | Mod: S$GLB,,, | Performed by: NURSE PRACTITIONER

## 2021-04-21 PROCEDURE — 99213 OFFICE O/P EST LOW 20 MIN: CPT | Mod: S$GLB,,, | Performed by: NURSE PRACTITIONER

## 2021-04-21 PROCEDURE — 99999 PR PBB SHADOW E&M-EST. PATIENT-LVL V: ICD-10-PCS | Mod: PBBFAC,,, | Performed by: NURSE PRACTITIONER

## 2021-04-21 PROCEDURE — 36415 COLL VENOUS BLD VENIPUNCTURE: CPT | Mod: PO | Performed by: NURSE PRACTITIONER

## 2021-04-21 PROCEDURE — 1126F AMNT PAIN NOTED NONE PRSNT: CPT | Mod: S$GLB,,, | Performed by: NURSE PRACTITIONER

## 2021-04-21 PROCEDURE — 3008F PR BODY MASS INDEX (BMI) DOCUMENTED: ICD-10-PCS | Mod: CPTII,S$GLB,, | Performed by: NURSE PRACTITIONER

## 2021-04-21 PROCEDURE — 99999 PR PBB SHADOW E&M-EST. PATIENT-LVL V: CPT | Mod: PBBFAC,,, | Performed by: NURSE PRACTITIONER

## 2021-04-21 PROCEDURE — 85025 COMPLETE CBC W/AUTO DIFF WBC: CPT | Performed by: NURSE PRACTITIONER

## 2021-04-21 PROCEDURE — 99213 PR OFFICE/OUTPT VISIT, EST, LEVL III, 20-29 MIN: ICD-10-PCS | Mod: S$GLB,,, | Performed by: NURSE PRACTITIONER

## 2021-04-21 PROCEDURE — 3008F BODY MASS INDEX DOCD: CPT | Mod: CPTII,S$GLB,, | Performed by: NURSE PRACTITIONER

## 2021-04-21 RX ORDER — DOXYCYCLINE HYCLATE 100 MG
100 TABLET ORAL 2 TIMES DAILY
Qty: 20 TABLET | Refills: 0 | Status: SHIPPED | OUTPATIENT
Start: 2021-04-21 | End: 2021-05-01

## 2021-04-22 ENCOUNTER — PATIENT MESSAGE (OUTPATIENT)
Dept: FAMILY MEDICINE | Facility: CLINIC | Age: 56
End: 2021-04-22

## 2021-05-03 ENCOUNTER — HOSPITAL ENCOUNTER (OUTPATIENT)
Dept: RADIOLOGY | Facility: HOSPITAL | Age: 56
Discharge: HOME | End: 2021-05-03
Attending: NURSE PRACTITIONER
Payer: COMMERCIAL

## 2021-05-03 DIAGNOSIS — R59.1 LYMPHADENOPATHY: ICD-10-CM

## 2021-05-03 PROCEDURE — 76536 US SOFT TISSUE HEAD NECK THYROID: ICD-10-PCS | Mod: 26,,, | Performed by: RADIOLOGY

## 2021-05-03 PROCEDURE — 76536 US EXAM OF HEAD AND NECK: CPT | Mod: 26,,, | Performed by: RADIOLOGY

## 2021-05-03 PROCEDURE — 76536 US EXAM OF HEAD AND NECK: CPT | Mod: TC,PO

## 2021-05-04 ENCOUNTER — PATIENT MESSAGE (OUTPATIENT)
Dept: FAMILY MEDICINE | Facility: CLINIC | Age: 56
End: 2021-05-04

## 2021-05-10 ENCOUNTER — PATIENT MESSAGE (OUTPATIENT)
Dept: RESEARCH | Facility: HOSPITAL | Age: 56
End: 2021-05-10

## 2021-05-11 ENCOUNTER — PATIENT MESSAGE (OUTPATIENT)
Dept: FAMILY MEDICINE | Facility: CLINIC | Age: 56
End: 2021-05-11

## 2021-05-12 ENCOUNTER — PATIENT MESSAGE (OUTPATIENT)
Dept: FAMILY MEDICINE | Facility: CLINIC | Age: 56
End: 2021-05-12

## 2021-05-13 ENCOUNTER — LAB VISIT (OUTPATIENT)
Dept: LAB | Facility: HOSPITAL | Age: 56
End: 2021-05-13
Attending: FAMILY MEDICINE
Payer: COMMERCIAL

## 2021-05-13 ENCOUNTER — OFFICE VISIT (OUTPATIENT)
Dept: FAMILY MEDICINE | Facility: CLINIC | Age: 56
End: 2021-05-13
Payer: COMMERCIAL

## 2021-05-13 VITALS
HEART RATE: 69 BPM | RESPIRATION RATE: 18 BRPM | OXYGEN SATURATION: 98 % | WEIGHT: 135.81 LBS | SYSTOLIC BLOOD PRESSURE: 138 MMHG | BODY MASS INDEX: 21.83 KG/M2 | HEIGHT: 66 IN | TEMPERATURE: 99 F | DIASTOLIC BLOOD PRESSURE: 84 MMHG

## 2021-05-13 DIAGNOSIS — R59.0 LAD (LYMPHADENOPATHY), CERVICAL: ICD-10-CM

## 2021-05-13 DIAGNOSIS — R59.1 LYMPHADENOPATHY: Primary | ICD-10-CM

## 2021-05-13 DIAGNOSIS — R59.1 LYMPHADENOPATHY: ICD-10-CM

## 2021-05-13 LAB
ALBUMIN SERPL BCP-MCNC: 4.1 G/DL (ref 3.5–5.2)
ALP SERPL-CCNC: 81 U/L (ref 55–135)
ALT SERPL W/O P-5'-P-CCNC: 27 U/L (ref 10–44)
ANION GAP SERPL CALC-SCNC: 10 MMOL/L (ref 8–16)
AST SERPL-CCNC: 26 U/L (ref 10–40)
BASOPHILS # BLD AUTO: 0.04 K/UL (ref 0–0.2)
BASOPHILS NFR BLD: 0.6 % (ref 0–1.9)
BILIRUB SERPL-MCNC: 0.7 MG/DL (ref 0.1–1)
BUN SERPL-MCNC: 15 MG/DL (ref 6–20)
CALCIUM SERPL-MCNC: 10.9 MG/DL (ref 8.7–10.5)
CHLORIDE SERPL-SCNC: 104 MMOL/L (ref 95–110)
CO2 SERPL-SCNC: 27 MMOL/L (ref 23–29)
CREAT SERPL-MCNC: 0.9 MG/DL (ref 0.5–1.4)
CRP SERPL-MCNC: 1.8 MG/L (ref 0–8.2)
DIFFERENTIAL METHOD: ABNORMAL
EOSINOPHIL # BLD AUTO: 0 K/UL (ref 0–0.5)
EOSINOPHIL NFR BLD: 0.4 % (ref 0–8)
ERYTHROCYTE [DISTWIDTH] IN BLOOD BY AUTOMATED COUNT: 13.1 % (ref 11.5–14.5)
ERYTHROCYTE [SEDIMENTATION RATE] IN BLOOD BY WESTERGREN METHOD: 4 MM/HR (ref 0–20)
EST. GFR  (AFRICAN AMERICAN): >60 ML/MIN/1.73 M^2
EST. GFR  (NON AFRICAN AMERICAN): >60 ML/MIN/1.73 M^2
GLUCOSE SERPL-MCNC: 88 MG/DL (ref 70–110)
HCT VFR BLD AUTO: 45.6 % (ref 37–48.5)
HGB BLD-MCNC: 14.6 G/DL (ref 12–16)
IMM GRANULOCYTES # BLD AUTO: 0.04 K/UL (ref 0–0.04)
IMM GRANULOCYTES NFR BLD AUTO: 0.6 % (ref 0–0.5)
LYMPHOCYTES # BLD AUTO: 1.8 K/UL (ref 1–4.8)
LYMPHOCYTES NFR BLD: 24.8 % (ref 18–48)
MCH RBC QN AUTO: 29.6 PG (ref 27–31)
MCHC RBC AUTO-ENTMCNC: 32 G/DL (ref 32–36)
MCV RBC AUTO: 92 FL (ref 82–98)
MONOCYTES # BLD AUTO: 0.6 K/UL (ref 0.3–1)
MONOCYTES NFR BLD: 9.1 % (ref 4–15)
NEUTROPHILS # BLD AUTO: 4.6 K/UL (ref 1.8–7.7)
NEUTROPHILS NFR BLD: 64.5 % (ref 38–73)
NRBC BLD-RTO: 0 /100 WBC
PLATELET # BLD AUTO: 217 K/UL (ref 150–450)
PMV BLD AUTO: 11 FL (ref 9.2–12.9)
POTASSIUM SERPL-SCNC: 4.2 MMOL/L (ref 3.5–5.1)
PROT SERPL-MCNC: 7.2 G/DL (ref 6–8.4)
RBC # BLD AUTO: 4.94 M/UL (ref 4–5.4)
SARS-COV-2 IGG SERPL IA-ACNC: ABNORMAL AU/ML
SARS-COV-2 IGG SERPL QL IA: POSITIVE
SODIUM SERPL-SCNC: 141 MMOL/L (ref 136–145)
WBC # BLD AUTO: 7.06 K/UL (ref 3.9–12.7)

## 2021-05-13 PROCEDURE — 3008F PR BODY MASS INDEX (BMI) DOCUMENTED: ICD-10-PCS | Mod: CPTII,S$GLB,, | Performed by: FAMILY MEDICINE

## 2021-05-13 PROCEDURE — 99214 OFFICE O/P EST MOD 30 MIN: CPT | Mod: S$GLB,,, | Performed by: FAMILY MEDICINE

## 2021-05-13 PROCEDURE — 1126F AMNT PAIN NOTED NONE PRSNT: CPT | Mod: S$GLB,,, | Performed by: FAMILY MEDICINE

## 2021-05-13 PROCEDURE — 99214 PR OFFICE/OUTPT VISIT, EST, LEVL IV, 30-39 MIN: ICD-10-PCS | Mod: S$GLB,,, | Performed by: FAMILY MEDICINE

## 2021-05-13 PROCEDURE — 1126F PR PAIN SEVERITY QUANTIFIED, NO PAIN PRESENT: ICD-10-PCS | Mod: S$GLB,,, | Performed by: FAMILY MEDICINE

## 2021-05-13 PROCEDURE — 86769 SARS-COV-2 COVID-19 ANTIBODY: CPT | Performed by: FAMILY MEDICINE

## 2021-05-13 PROCEDURE — 85025 COMPLETE CBC W/AUTO DIFF WBC: CPT | Performed by: FAMILY MEDICINE

## 2021-05-13 PROCEDURE — 99999 PR PBB SHADOW E&M-EST. PATIENT-LVL V: CPT | Mod: PBBFAC,,, | Performed by: FAMILY MEDICINE

## 2021-05-13 PROCEDURE — 86140 C-REACTIVE PROTEIN: CPT | Performed by: FAMILY MEDICINE

## 2021-05-13 PROCEDURE — 99999 PR PBB SHADOW E&M-EST. PATIENT-LVL V: ICD-10-PCS | Mod: PBBFAC,,, | Performed by: FAMILY MEDICINE

## 2021-05-13 PROCEDURE — 3008F BODY MASS INDEX DOCD: CPT | Mod: CPTII,S$GLB,, | Performed by: FAMILY MEDICINE

## 2021-05-13 PROCEDURE — 85651 RBC SED RATE NONAUTOMATED: CPT | Mod: PO | Performed by: FAMILY MEDICINE

## 2021-05-13 PROCEDURE — 36415 COLL VENOUS BLD VENIPUNCTURE: CPT | Mod: PO | Performed by: FAMILY MEDICINE

## 2021-05-13 PROCEDURE — 80053 COMPREHEN METABOLIC PANEL: CPT | Performed by: FAMILY MEDICINE

## 2021-05-13 RX ORDER — ESTERIFIED ESTROGEN AND METHYLTESTOSTERONE .625; 1.25 MG/1; MG/1
1 TABLET ORAL DAILY
COMMUNITY
Start: 2021-05-11 | End: 2022-05-17 | Stop reason: SDUPTHER

## 2021-05-24 ENCOUNTER — PATIENT MESSAGE (OUTPATIENT)
Dept: FAMILY MEDICINE | Facility: CLINIC | Age: 56
End: 2021-05-24

## 2021-06-02 ENCOUNTER — PATIENT MESSAGE (OUTPATIENT)
Dept: FAMILY MEDICINE | Facility: CLINIC | Age: 56
End: 2021-06-02

## 2021-06-02 DIAGNOSIS — T78.40XD ALLERGY, SUBSEQUENT ENCOUNTER: ICD-10-CM

## 2021-06-02 DIAGNOSIS — K21.00 GASTROESOPHAGEAL REFLUX DISEASE WITH ESOPHAGITIS WITHOUT HEMORRHAGE: Primary | ICD-10-CM

## 2021-06-02 DIAGNOSIS — K44.9 HIATAL HERNIA: ICD-10-CM

## 2021-06-02 RX ORDER — MONTELUKAST SODIUM 10 MG/1
10 TABLET ORAL NIGHTLY
Qty: 30 TABLET | Refills: 0 | Status: SHIPPED | OUTPATIENT
Start: 2021-06-02 | End: 2021-06-24

## 2021-06-02 RX ORDER — OMEPRAZOLE 40 MG/1
40 CAPSULE, DELAYED RELEASE ORAL DAILY
Qty: 90 CAPSULE | Refills: 3 | Status: SHIPPED | OUTPATIENT
Start: 2021-06-02 | End: 2022-03-18 | Stop reason: SDUPTHER

## 2021-06-03 ENCOUNTER — PATIENT MESSAGE (OUTPATIENT)
Dept: GASTROENTEROLOGY | Facility: CLINIC | Age: 56
End: 2021-06-03

## 2021-06-09 ENCOUNTER — TELEPHONE (OUTPATIENT)
Dept: RADIOLOGY | Facility: HOSPITAL | Age: 56
End: 2021-06-09

## 2021-06-10 ENCOUNTER — HOSPITAL ENCOUNTER (OUTPATIENT)
Dept: RADIOLOGY | Facility: HOSPITAL | Age: 56
Discharge: HOME | End: 2021-06-10
Attending: FAMILY MEDICINE
Payer: COMMERCIAL

## 2021-06-10 DIAGNOSIS — R59.1 LYMPHADENOPATHY: ICD-10-CM

## 2021-06-10 DIAGNOSIS — R59.0 LAD (LYMPHADENOPATHY), CERVICAL: ICD-10-CM

## 2021-06-10 PROCEDURE — 76536 US SOFT TISSUE HEAD NECK THYROID: ICD-10-PCS | Mod: 26,,, | Performed by: RADIOLOGY

## 2021-06-10 PROCEDURE — 76536 US EXAM OF HEAD AND NECK: CPT | Mod: 26,,, | Performed by: RADIOLOGY

## 2021-06-10 PROCEDURE — 76536 US EXAM OF HEAD AND NECK: CPT | Mod: TC,PO

## 2021-06-24 DIAGNOSIS — T78.40XD ALLERGY, SUBSEQUENT ENCOUNTER: ICD-10-CM

## 2021-06-24 RX ORDER — MONTELUKAST SODIUM 10 MG/1
TABLET ORAL
Qty: 30 TABLET | Refills: 12 | Status: SHIPPED | OUTPATIENT
Start: 2021-06-24 | End: 2021-08-27

## 2021-08-06 DIAGNOSIS — E03.9 ACQUIRED HYPOTHYROIDISM: ICD-10-CM

## 2021-08-06 RX ORDER — THYROID 30 MG/1
TABLET ORAL
Qty: 90 TABLET | Refills: 4 | Status: SHIPPED | OUTPATIENT
Start: 2021-08-06 | End: 2022-09-02

## 2021-08-27 ENCOUNTER — OFFICE VISIT (OUTPATIENT)
Dept: CARDIOLOGY | Facility: CLINIC | Age: 56
End: 2021-08-27
Payer: COMMERCIAL

## 2021-08-27 VITALS
WEIGHT: 142.44 LBS | HEIGHT: 66 IN | BODY MASS INDEX: 22.89 KG/M2 | SYSTOLIC BLOOD PRESSURE: 116 MMHG | HEART RATE: 65 BPM | DIASTOLIC BLOOD PRESSURE: 73 MMHG

## 2021-08-27 DIAGNOSIS — E78.00 PURE HYPERCHOLESTEROLEMIA: Chronic | ICD-10-CM

## 2021-08-27 DIAGNOSIS — I10 ESSENTIAL HYPERTENSION: Primary | Chronic | ICD-10-CM

## 2021-08-27 DIAGNOSIS — I36.1 NONRHEUMATIC TRICUSPID VALVE REGURGITATION: Chronic | ICD-10-CM

## 2021-08-27 DIAGNOSIS — R00.1 BRADYCARDIA: Chronic | ICD-10-CM

## 2021-08-27 DIAGNOSIS — R93.1 AGATSTON CAC SCORE, <100: Chronic | ICD-10-CM

## 2021-08-27 PROCEDURE — 3008F PR BODY MASS INDEX (BMI) DOCUMENTED: ICD-10-PCS | Mod: CPTII,S$GLB,, | Performed by: INTERNAL MEDICINE

## 2021-08-27 PROCEDURE — 3078F DIAST BP <80 MM HG: CPT | Mod: CPTII,S$GLB,, | Performed by: INTERNAL MEDICINE

## 2021-08-27 PROCEDURE — 1159F MED LIST DOCD IN RCRD: CPT | Mod: CPTII,S$GLB,, | Performed by: INTERNAL MEDICINE

## 2021-08-27 PROCEDURE — 3074F PR MOST RECENT SYSTOLIC BLOOD PRESSURE < 130 MM HG: ICD-10-PCS | Mod: CPTII,S$GLB,, | Performed by: INTERNAL MEDICINE

## 2021-08-27 PROCEDURE — 3008F BODY MASS INDEX DOCD: CPT | Mod: CPTII,S$GLB,, | Performed by: INTERNAL MEDICINE

## 2021-08-27 PROCEDURE — 99214 PR OFFICE/OUTPT VISIT, EST, LEVL IV, 30-39 MIN: ICD-10-PCS | Mod: S$GLB,,, | Performed by: INTERNAL MEDICINE

## 2021-08-27 PROCEDURE — 99214 OFFICE O/P EST MOD 30 MIN: CPT | Mod: S$GLB,,, | Performed by: INTERNAL MEDICINE

## 2021-08-27 PROCEDURE — 99999 PR PBB SHADOW E&M-EST. PATIENT-LVL III: CPT | Mod: PBBFAC,,, | Performed by: INTERNAL MEDICINE

## 2021-08-27 PROCEDURE — 1159F PR MEDICATION LIST DOCUMENTED IN MEDICAL RECORD: ICD-10-PCS | Mod: CPTII,S$GLB,, | Performed by: INTERNAL MEDICINE

## 2021-08-27 PROCEDURE — 3078F PR MOST RECENT DIASTOLIC BLOOD PRESSURE < 80 MM HG: ICD-10-PCS | Mod: CPTII,S$GLB,, | Performed by: INTERNAL MEDICINE

## 2021-08-27 PROCEDURE — 99999 PR PBB SHADOW E&M-EST. PATIENT-LVL III: ICD-10-PCS | Mod: PBBFAC,,, | Performed by: INTERNAL MEDICINE

## 2021-08-27 PROCEDURE — 3074F SYST BP LT 130 MM HG: CPT | Mod: CPTII,S$GLB,, | Performed by: INTERNAL MEDICINE

## 2021-08-27 RX ORDER — LEVOCETIRIZINE DIHYDROCHLORIDE 5 MG/1
5 TABLET, FILM COATED ORAL NIGHTLY
COMMUNITY

## 2021-09-14 ENCOUNTER — CLINICAL SUPPORT (OUTPATIENT)
Dept: CARDIOLOGY | Facility: HOSPITAL | Age: 56
End: 2021-09-14
Attending: INTERNAL MEDICINE
Payer: COMMERCIAL

## 2021-09-14 DIAGNOSIS — I10 ESSENTIAL HYPERTENSION: ICD-10-CM

## 2021-09-14 DIAGNOSIS — E78.00 PURE HYPERCHOLESTEROLEMIA: ICD-10-CM

## 2021-09-14 LAB
LEFT CBA DIAS: 29 CM/S
LEFT CBA SYS: 81 CM/S
LEFT CCA DIST DIAS: 28 CM/S
LEFT CCA DIST SYS: 99 CM/S
LEFT CCA MID DIAS: 31 CM/S
LEFT CCA MID SYS: 100 CM/S
LEFT CCA PROX DIAS: 23 CM/S
LEFT CCA PROX SYS: 104 CM/S
LEFT ECA DIAS: 7 CM/S
LEFT ECA SYS: 75 CM/S
LEFT ICA DIST DIAS: 36 CM/S
LEFT ICA DIST SYS: 87 CM/S
LEFT ICA MID DIAS: 42 CM/S
LEFT ICA MID SYS: 88 CM/S
LEFT ICA PROX DIAS: 33 CM/S
LEFT ICA PROX SYS: 99 CM/S
LEFT VERTEBRAL DIAS: 16 CM/S
LEFT VERTEBRAL SYS: 50 CM/S
OHS CV CAROTID RIGHT ICA EDV HIGHEST: 31
OHS CV CAROTID ULTRASOUND LEFT ICA/CCA RATIO: 1
OHS CV CAROTID ULTRASOUND RIGHT ICA/CCA RATIO: 0.99
OHS CV PV CAROTID LEFT HIGHEST CCA: 104
OHS CV PV CAROTID LEFT HIGHEST ICA: 99
OHS CV PV CAROTID RIGHT HIGHEST CCA: 96
OHS CV PV CAROTID RIGHT HIGHEST ICA: 95
OHS CV US CAROTID LEFT HIGHEST EDV: 42
RIGHT CBA DIAS: 21 CM/S
RIGHT CBA SYS: 96 CM/S
RIGHT CCA DIST DIAS: 22 CM/S
RIGHT CCA DIST SYS: 96 CM/S
RIGHT CCA MID DIAS: 18 CM/S
RIGHT CCA MID SYS: 88 CM/S
RIGHT CCA PROX DIAS: 15 CM/S
RIGHT CCA PROX SYS: 80 CM/S
RIGHT ECA DIAS: 8 CM/S
RIGHT ECA SYS: 72 CM/S
RIGHT ICA DIST DIAS: 31 CM/S
RIGHT ICA DIST SYS: 82 CM/S
RIGHT ICA MID DIAS: 31 CM/S
RIGHT ICA MID SYS: 85 CM/S
RIGHT ICA PROX DIAS: 28 CM/S
RIGHT ICA PROX SYS: 95 CM/S
RIGHT VERTEBRAL DIAS: 12 CM/S
RIGHT VERTEBRAL SYS: 46 CM/S

## 2021-09-14 PROCEDURE — 93880 EXTRACRANIAL BILAT STUDY: CPT | Mod: PO

## 2021-09-14 PROCEDURE — 93880 EXTRACRANIAL BILAT STUDY: CPT | Mod: 26,,, | Performed by: INTERNAL MEDICINE

## 2021-09-14 PROCEDURE — 93880 CV US DOPPLER CAROTID (CUPID ONLY): ICD-10-PCS | Mod: 26,,, | Performed by: INTERNAL MEDICINE

## 2021-09-17 ENCOUNTER — CLINICAL SUPPORT (OUTPATIENT)
Dept: OTHER | Facility: CLINIC | Age: 56
End: 2021-09-17
Payer: COMMERCIAL

## 2021-09-17 DIAGNOSIS — Z00.8 ENCOUNTER FOR OTHER GENERAL EXAMINATION: ICD-10-CM

## 2021-09-17 PROCEDURE — 99401 PR PREVENT COUNSEL,INDIV,15 MIN: ICD-10-PCS | Mod: 25,S$GLB,, | Performed by: INTERNAL MEDICINE

## 2021-09-17 PROCEDURE — 80061 PR  LIPID PANEL: ICD-10-PCS | Mod: QW,S$GLB,, | Performed by: INTERNAL MEDICINE

## 2021-09-17 PROCEDURE — 80061 LIPID PANEL: CPT | Mod: QW,S$GLB,, | Performed by: INTERNAL MEDICINE

## 2021-09-17 PROCEDURE — 99401 PREV MED CNSL INDIV APPRX 15: CPT | Mod: 25,S$GLB,, | Performed by: INTERNAL MEDICINE

## 2021-09-17 PROCEDURE — 82947 ASSAY GLUCOSE BLOOD QUANT: CPT | Mod: QW,S$GLB,, | Performed by: INTERNAL MEDICINE

## 2021-09-17 PROCEDURE — 82947 PR  ASSAY QUANTITATIVE,BLOOD GLUCOSE: ICD-10-PCS | Mod: QW,S$GLB,, | Performed by: INTERNAL MEDICINE

## 2021-09-18 VITALS — HEIGHT: 66 IN | BODY MASS INDEX: 22.99 KG/M2

## 2021-09-18 LAB
HDLC SERPL-MCNC: 65 MG/DL
POC CHOLESTEROL, TOTAL: 209 MG/DL
POC GLUCOSE, FASTING: 83 MG/DL (ref 60–110)
TRIGL SERPL-MCNC: 44 MG/DL

## 2021-09-21 ENCOUNTER — PATIENT MESSAGE (OUTPATIENT)
Dept: FAMILY MEDICINE | Facility: CLINIC | Age: 56
End: 2021-09-21

## 2021-09-21 ENCOUNTER — TELEPHONE (OUTPATIENT)
Dept: FAMILY MEDICINE | Facility: CLINIC | Age: 56
End: 2021-09-21

## 2021-09-21 DIAGNOSIS — L70.9 ACNE, UNSPECIFIED ACNE TYPE: Primary | ICD-10-CM

## 2021-09-21 DIAGNOSIS — L98.9 SKIN LESION: ICD-10-CM

## 2021-09-21 RX ORDER — CLINDAMYCIN PHOSPHATE AND BENZOYL PEROXIDE 10; 37.5 MG/G; MG/G
GEL TOPICAL
Qty: 1 BOTTLE | Refills: 1 | Status: SHIPPED | OUTPATIENT
Start: 2021-09-21

## 2021-09-21 RX ORDER — CLOTRIMAZOLE AND BETAMETHASONE DIPROPIONATE 10; .64 MG/G; MG/G
CREAM TOPICAL 2 TIMES DAILY
Qty: 1 TUBE | Refills: 1 | Status: SHIPPED | OUTPATIENT
Start: 2021-09-21

## 2022-03-07 ENCOUNTER — PATIENT MESSAGE (OUTPATIENT)
Dept: ADMINISTRATIVE | Facility: OTHER | Age: 57
End: 2022-03-07
Payer: COMMERCIAL

## 2022-03-18 ENCOUNTER — LAB VISIT (OUTPATIENT)
Dept: LAB | Facility: HOSPITAL | Age: 57
End: 2022-03-18
Attending: FAMILY MEDICINE
Payer: COMMERCIAL

## 2022-03-18 DIAGNOSIS — Z00.01 ENCOUNTER FOR GENERAL ADULT MEDICAL EXAMINATION WITH ABNORMAL FINDINGS: ICD-10-CM

## 2022-03-18 DIAGNOSIS — K21.00 GASTROESOPHAGEAL REFLUX DISEASE WITH ESOPHAGITIS WITHOUT HEMORRHAGE: ICD-10-CM

## 2022-03-18 DIAGNOSIS — E78.00 PURE HYPERCHOLESTEROLEMIA: ICD-10-CM

## 2022-03-18 DIAGNOSIS — E03.9 ACQUIRED HYPOTHYROIDISM: ICD-10-CM

## 2022-03-18 DIAGNOSIS — Z79.899 ENCOUNTER FOR LONG-TERM (CURRENT) USE OF MEDICATIONS: ICD-10-CM

## 2022-03-18 LAB
ALBUMIN SERPL BCP-MCNC: 3.9 G/DL (ref 3.5–5.2)
ALP SERPL-CCNC: 71 U/L (ref 55–135)
ALT SERPL W/O P-5'-P-CCNC: 22 U/L (ref 10–44)
ANION GAP SERPL CALC-SCNC: 9 MMOL/L (ref 8–16)
AST SERPL-CCNC: 24 U/L (ref 10–40)
BILIRUB SERPL-MCNC: 0.9 MG/DL (ref 0.1–1)
BUN SERPL-MCNC: 19 MG/DL (ref 6–20)
CALCIUM SERPL-MCNC: 9.9 MG/DL (ref 8.7–10.5)
CHLORIDE SERPL-SCNC: 106 MMOL/L (ref 95–110)
CHOLEST SERPL-MCNC: 220 MG/DL (ref 120–199)
CHOLEST/HDLC SERPL: 3 {RATIO} (ref 2–5)
CO2 SERPL-SCNC: 27 MMOL/L (ref 23–29)
CREAT SERPL-MCNC: 0.8 MG/DL (ref 0.5–1.4)
EST. GFR  (AFRICAN AMERICAN): >60 ML/MIN/1.73 M^2
EST. GFR  (NON AFRICAN AMERICAN): >60 ML/MIN/1.73 M^2
GLUCOSE SERPL-MCNC: 81 MG/DL (ref 70–110)
HDLC SERPL-MCNC: 73 MG/DL (ref 40–75)
HDLC SERPL: 33.2 % (ref 20–50)
LDLC SERPL CALC-MCNC: 137.2 MG/DL (ref 63–159)
NONHDLC SERPL-MCNC: 147 MG/DL
POTASSIUM SERPL-SCNC: 3.9 MMOL/L (ref 3.5–5.1)
PROT SERPL-MCNC: 6.7 G/DL (ref 6–8.4)
SODIUM SERPL-SCNC: 142 MMOL/L (ref 136–145)
TRIGL SERPL-MCNC: 49 MG/DL (ref 30–150)
TSH SERPL DL<=0.005 MIU/L-ACNC: 1.66 UIU/ML (ref 0.4–4)

## 2022-03-18 PROCEDURE — 84443 ASSAY THYROID STIM HORMONE: CPT | Performed by: FAMILY MEDICINE

## 2022-03-18 PROCEDURE — 80053 COMPREHEN METABOLIC PANEL: CPT | Performed by: FAMILY MEDICINE

## 2022-03-18 PROCEDURE — 36415 COLL VENOUS BLD VENIPUNCTURE: CPT | Mod: PO | Performed by: FAMILY MEDICINE

## 2022-03-18 PROCEDURE — 80061 LIPID PANEL: CPT | Performed by: FAMILY MEDICINE

## 2022-03-18 RX ORDER — OMEPRAZOLE 40 MG/1
CAPSULE, DELAYED RELEASE ORAL
Qty: 90 CAPSULE | Refills: 0 | Status: SHIPPED | OUTPATIENT
Start: 2022-03-18 | End: 2022-10-06

## 2022-03-18 NOTE — TELEPHONE ENCOUNTER
This Rx Request does not qualify for assessment with the OR   Please review protocol details and the Care Due Message for extra clinical information    Reasons Rx Request may be deferred:  Required indication for medication is not active on problem list    Note composed:5:00 PM 03/18/2022

## 2022-03-21 ENCOUNTER — PATIENT MESSAGE (OUTPATIENT)
Dept: FAMILY MEDICINE | Facility: CLINIC | Age: 57
End: 2022-03-21
Payer: COMMERCIAL

## 2022-03-21 NOTE — PROGRESS NOTES
Make follow-up lab appointment per recommendation below.  Check to see if patient has seen the results through my chart.  If not then,  #CALL THE PATIENT# to discuss results/see if they have questions and document verification of contact. Make F/U appt if needed. [phone]    #My interpretation that was sent to them through [employer]:  MINNIE, I have reviewed your recent blood work.     Your metabolic panel which shows your glucose, kidney function, electrolytes, and liver function is normal.   Thyroid study is normal on current dosage of thyroid replacement..   Your cholesterol is slightly elevated from previous.  Still with low risk score.  see below.  =========================  Also please address any outstanding health maintenance that may be due: COVID-19 Vaccine(1) Never done  Shingles Vaccine(1 of 2) Never done  Influenza Vaccine(1) due on 09/01/2021

## 2022-03-27 ENCOUNTER — PATIENT MESSAGE (OUTPATIENT)
Dept: FAMILY MEDICINE | Facility: CLINIC | Age: 57
End: 2022-03-27
Payer: COMMERCIAL

## 2022-03-31 ENCOUNTER — HOSPITAL ENCOUNTER (OUTPATIENT)
Dept: RADIOLOGY | Facility: HOSPITAL | Age: 57
Discharge: HOME | End: 2022-03-31
Attending: NURSE PRACTITIONER
Payer: COMMERCIAL

## 2022-03-31 ENCOUNTER — OFFICE VISIT (OUTPATIENT)
Dept: FAMILY MEDICINE | Facility: CLINIC | Age: 57
End: 2022-03-31
Payer: COMMERCIAL

## 2022-03-31 VITALS
BODY MASS INDEX: 23.29 KG/M2 | HEIGHT: 66 IN | WEIGHT: 144.88 LBS | HEART RATE: 71 BPM | OXYGEN SATURATION: 98 % | SYSTOLIC BLOOD PRESSURE: 112 MMHG | DIASTOLIC BLOOD PRESSURE: 78 MMHG

## 2022-03-31 DIAGNOSIS — M77.11 LATERAL EPICONDYLITIS OF RIGHT ELBOW: Primary | ICD-10-CM

## 2022-03-31 DIAGNOSIS — M77.11 LATERAL EPICONDYLITIS OF RIGHT ELBOW: ICD-10-CM

## 2022-03-31 PROCEDURE — 73080 X-RAY EXAM OF ELBOW: CPT | Mod: TC,PO,RT

## 2022-03-31 PROCEDURE — 73080 XR ELBOW COMPLETE 3 VIEW RIGHT: ICD-10-PCS | Mod: 26,RT,, | Performed by: RADIOLOGY

## 2022-03-31 PROCEDURE — 99999 PR PBB SHADOW E&M-EST. PATIENT-LVL V: ICD-10-PCS | Mod: PBBFAC,,, | Performed by: NURSE PRACTITIONER

## 2022-03-31 PROCEDURE — 99214 OFFICE O/P EST MOD 30 MIN: CPT | Mod: S$GLB,,, | Performed by: NURSE PRACTITIONER

## 2022-03-31 PROCEDURE — 3078F DIAST BP <80 MM HG: CPT | Mod: CPTII,S$GLB,, | Performed by: NURSE PRACTITIONER

## 2022-03-31 PROCEDURE — 1159F MED LIST DOCD IN RCRD: CPT | Mod: CPTII,S$GLB,, | Performed by: NURSE PRACTITIONER

## 2022-03-31 PROCEDURE — 3078F PR MOST RECENT DIASTOLIC BLOOD PRESSURE < 80 MM HG: ICD-10-PCS | Mod: CPTII,S$GLB,, | Performed by: NURSE PRACTITIONER

## 2022-03-31 PROCEDURE — 3008F PR BODY MASS INDEX (BMI) DOCUMENTED: ICD-10-PCS | Mod: CPTII,S$GLB,, | Performed by: NURSE PRACTITIONER

## 2022-03-31 PROCEDURE — 1160F RVW MEDS BY RX/DR IN RCRD: CPT | Mod: CPTII,S$GLB,, | Performed by: NURSE PRACTITIONER

## 2022-03-31 PROCEDURE — 1159F PR MEDICATION LIST DOCUMENTED IN MEDICAL RECORD: ICD-10-PCS | Mod: CPTII,S$GLB,, | Performed by: NURSE PRACTITIONER

## 2022-03-31 PROCEDURE — 3008F BODY MASS INDEX DOCD: CPT | Mod: CPTII,S$GLB,, | Performed by: NURSE PRACTITIONER

## 2022-03-31 PROCEDURE — 73080 X-RAY EXAM OF ELBOW: CPT | Mod: 26,RT,, | Performed by: RADIOLOGY

## 2022-03-31 PROCEDURE — 3074F PR MOST RECENT SYSTOLIC BLOOD PRESSURE < 130 MM HG: ICD-10-PCS | Mod: CPTII,S$GLB,, | Performed by: NURSE PRACTITIONER

## 2022-03-31 PROCEDURE — 99999 PR PBB SHADOW E&M-EST. PATIENT-LVL V: CPT | Mod: PBBFAC,,, | Performed by: NURSE PRACTITIONER

## 2022-03-31 PROCEDURE — 1160F PR REVIEW ALL MEDS BY PRESCRIBER/CLIN PHARMACIST DOCUMENTED: ICD-10-PCS | Mod: CPTII,S$GLB,, | Performed by: NURSE PRACTITIONER

## 2022-03-31 PROCEDURE — 99214 PR OFFICE/OUTPT VISIT, EST, LEVL IV, 30-39 MIN: ICD-10-PCS | Mod: S$GLB,,, | Performed by: NURSE PRACTITIONER

## 2022-03-31 PROCEDURE — 3074F SYST BP LT 130 MM HG: CPT | Mod: CPTII,S$GLB,, | Performed by: NURSE PRACTITIONER

## 2022-03-31 RX ORDER — MELOXICAM 15 MG/1
15 TABLET ORAL DAILY PRN
Qty: 30 TABLET | Refills: 0 | Status: SHIPPED | OUTPATIENT
Start: 2022-03-31 | End: 2022-04-28 | Stop reason: ALTCHOICE

## 2022-03-31 NOTE — PROGRESS NOTES
Assessment/Plan:  Problem List Items Addressed This Visit    None     Visit Diagnoses     Lateral epicondylitis of right elbow    -  Primary    Relevant Medications    meloxicam (MOBIC) 15 MG tablet    Other Relevant Orders    X-Ray Elbow Complete Right      Obtain imaging   Recommend start anti-inflammatory as prescribed  Supportive care- rest, ice, heat, avoid heavy lifting, limit strenuous activity.     Follow up in about 1 week (around 2022), or if symptoms worsen or fail to improve, for Follow up with Dr. Coleman.    Rosa Teague, MARLON  _____________________________________________________________________________________________________________________________________________________    CC: elbow pain    HPI: Patient is a 57-year-old female who presents in clinic today as an established patient here for elbow pain. Patient complains of right elbow pain. Onset of the symptoms was about x2 weeks ago. Inciting event: none know, she reports that she has been trying to work out and noticed pain while doing push ups. Current symptoms include point tenderness lateral epicondyle. Pain is aggravated by: lifting heavy objects, extension. Patient's overall course: gradually worsening. Patient has had no prior elbow problems. Evaluation to date: none. Treatment to date: avoidance of offending activity and OTC analgesics.    Past Medical History:  Past Medical History:   Diagnosis Date    Acne     Acquired hypothyroidism 2020    Chronic.  Stable. Lab Results Component Value Date  TSH 1.993 2020  FREET4 1.08 2019  Patient reports compliance with [place] Thyroid 30 mg.    Diverticulosis     Encounter for blood transfusion     Hyperlipidemia     Hypertension     IBS (irritable bowel syndrome)      Past Surgical History:   Procedure Laterality Date     SECTION      X3    CYSTOSCOPY W/ RETROGRADES N/A 10/2/2019    Procedure: CYSTOSCOPY, WITH RETROGRADE PYELOGRAM;  Surgeon: Fred Aranda  MD;  Location: [facility] OR;  Service: Urology;  Laterality: N/A;    EYE SURGERY  1995    HYSTERECTOMY      TUBAL LIGATION  5/1994     Review of patient's allergies indicates:   Allergen Reactions    Zetia [ezetimibe] Diarrhea    Atorvastatin      Social History     Tobacco Use    Smoking status: Never Smoker    Smokeless tobacco: Never Used   Substance Use Topics    Alcohol use: Yes     Alcohol/week: 4.0 standard drinks     Types: 4 Standard drinks or equivalent per week     Comment: daily    Drug use: Never     Family History   Problem Relation Age of Onset    Breast cancer Mother     Hyperlipidemia Mother     Hypertension Mother     Diabetes Mother     Cancer Mother         Breast    Diverticulitis Mother     Alzheimer's disease Mother     Depression Mother     Hearing loss Mother     Heart disease Mother     Hypertension Father     Hyperlipidemia Father     Stroke Father     Hearing loss Father     Heart disease Father     Diverticulitis Sister     Breast cancer Maternal Aunt     Breast cancer Paternal Aunt     Cancer Brother     Cancer Brother     Cancer Brother         Nasopharyngeal - bladder     Current Outpatient Medications on File Prior to Visit   Medication Sig Dispense Refill    clotrimazole-betamethasone 1-0.05% (LOTRISONE) cream Apply topically 2 (two) times daily. 1 Tube 1    dicyclomine (BENTYL) 10 MG capsule daily as needed.       estrogens,conjugated,-methyltestosterone 0.625-1.25mg (ESTRATEST HS) 0.625-1.25 mg per tablet Take 1 tablet by mouth once daily.      hydroCHLOROthiazide (HYDRODIURIL) 12.5 MG Tab Take 1 tablet (12.5 mg total) by mouth every other day. 45 tablet 0    levocetirizine (XYZAL) 5 MG tablet Take 5 mg by mouth every evening.      multivit,iron,minerals/lutein (CENTRUM SILVER ULTRA WOMEN'S ORAL)       omega 3-dha-epa-fish oil (FISH OIL) 1,000 mg (120 mg-180 mg) Cap Take as directed.      omeprazole (PRILOSEC) 40 MG capsule TAKE 1 CAPSULE BY MOUTH  "EVERY DAY 90 capsule 0    ONEXTON 1.2 %(1 % base) -3.75 % GlwP Use as directed. 1 Bottle 1    pravastatin (PRAVACHOL) 40 MG tablet TAKE 1 TABLET BY MOUTH EVERY DAY 90 tablet 3    Saccharomyces boulardii (FLORASTOR) 250 mg capsule Take 250 mg by mouth once daily.      thyroid, pork, (ARMOUR THYROID) 30 mg Tab TAKE 1 TABLET (30 MG TOTAL) BY MOUTH BEFORE BREAKFAST. 90 tablet 4    triamcinolone acetonide (NASACORT AQ NASL) 2 Squirts by Nasal route Daily.      aspirin (ECOTRIN) 81 MG EC tablet Take 1 tablet (81 mg total) by mouth once daily. 90 tablet 3    [DISCONTINUED] calcium carbonate (OS-BENITA) 600 mg (1,500 mg) Tab Take 600 mg by mouth once daily.       No current facility-administered medications on file prior to visit.     Review of Systems   Constitutional: Negative for appetite change, chills, fatigue and fever.   HENT: Negative for congestion, rhinorrhea and sore throat.    Eyes: Negative for visual disturbance.   Respiratory: Negative for cough and shortness of breath.    Cardiovascular: Negative for chest pain, palpitations and leg swelling.   Gastrointestinal: Negative for abdominal pain, diarrhea and vomiting.   Genitourinary: Negative for difficulty urinating, dysuria and hematuria.   Musculoskeletal: Positive for arthralgias. Negative for myalgias.   Skin: Negative for rash and wound.   Neurological: Negative for dizziness and headaches.   Psychiatric/Behavioral: Negative for behavioral problems. The patient is not nervous/anxious.      Vitals:    03/31/22 0903   BP: 112/78   BP Location: Left arm   Pulse: 71   SpO2: 98%   Weight: 65.7 kg (144 lb 14.4 oz)   Height: 5' 6" (1.676 m)     Wt Readings from Last 3 Encounters:   03/31/22 65.7 kg (144 lb 14.4 oz)   08/27/21 64.6 kg (142 lb 6.7 oz)   05/13/21 61.6 kg (135 lb 12.8 oz)     Physical Exam  Vitals reviewed.   Constitutional:       General: She is not in acute distress.     Appearance: Normal appearance. She is not ill-appearing.   HENT:      " Head: Normocephalic and atraumatic.      Right Ear: External ear normal.      Left Ear: External ear normal.   Eyes:      Extraocular Movements: Extraocular movements intact.      Conjunctiva/sclera: Conjunctivae normal.   Cardiovascular:      Rate and Rhythm: Normal rate.      Heart sounds: Normal heart sounds.   Pulmonary:      Effort: Pulmonary effort is normal. No respiratory distress.      Breath sounds: Normal breath sounds.   Abdominal:      General: Abdomen is flat. There is no distension.   Musculoskeletal:         General: Normal range of motion.      Right elbow: No swelling or deformity. Normal range of motion. Tenderness present in lateral epicondyle.      Cervical back: Normal range of motion.   Skin:     General: Skin is warm and dry.      Capillary Refill: Capillary refill takes less than 2 seconds.      Coloration: Skin is not pale.      Findings: No rash.   Neurological:      Mental Status: She is alert and oriented to person, place, and time. Mental status is at baseline.   Psychiatric:         Mood and Affect: Mood normal.         Speech: Speech normal.         Behavior: Behavior normal. Behavior is cooperative.       Health Maintenance   Topic Date Due    Mammogram  02/24/2023    TETANUS VACCINE  02/09/2027    Lipid Panel  03/18/2027    Hepatitis C Screening  Completed

## 2022-04-26 ENCOUNTER — PATIENT MESSAGE (OUTPATIENT)
Dept: FAMILY MEDICINE | Facility: CLINIC | Age: 57
End: 2022-04-26
Payer: COMMERCIAL

## 2022-04-28 ENCOUNTER — OFFICE VISIT (OUTPATIENT)
Dept: FAMILY MEDICINE | Facility: CLINIC | Age: 57
End: 2022-04-28
Payer: COMMERCIAL

## 2022-04-28 VITALS
SYSTOLIC BLOOD PRESSURE: 128 MMHG | DIASTOLIC BLOOD PRESSURE: 70 MMHG | BODY MASS INDEX: 22.93 KG/M2 | RESPIRATION RATE: 16 BRPM | HEART RATE: 57 BPM | TEMPERATURE: 98 F | OXYGEN SATURATION: 98 % | HEIGHT: 66 IN | WEIGHT: 142.69 LBS

## 2022-04-28 DIAGNOSIS — M77.11 LATERAL EPICONDYLITIS OF RIGHT ELBOW: Primary | ICD-10-CM

## 2022-04-28 PROCEDURE — 99999 PR PBB SHADOW E&M-EST. PATIENT-LVL V: ICD-10-PCS | Mod: PBBFAC,,, | Performed by: NURSE PRACTITIONER

## 2022-04-28 PROCEDURE — 1160F PR REVIEW ALL MEDS BY PRESCRIBER/CLIN PHARMACIST DOCUMENTED: ICD-10-PCS | Mod: CPTII,S$GLB,, | Performed by: NURSE PRACTITIONER

## 2022-04-28 PROCEDURE — 99213 OFFICE O/P EST LOW 20 MIN: CPT | Mod: S$GLB,,, | Performed by: NURSE PRACTITIONER

## 2022-04-28 PROCEDURE — 99999 PR PBB SHADOW E&M-EST. PATIENT-LVL V: CPT | Mod: PBBFAC,,, | Performed by: NURSE PRACTITIONER

## 2022-04-28 PROCEDURE — 1160F RVW MEDS BY RX/DR IN RCRD: CPT | Mod: CPTII,S$GLB,, | Performed by: NURSE PRACTITIONER

## 2022-04-28 PROCEDURE — 3008F PR BODY MASS INDEX (BMI) DOCUMENTED: ICD-10-PCS | Mod: CPTII,S$GLB,, | Performed by: NURSE PRACTITIONER

## 2022-04-28 PROCEDURE — 3074F SYST BP LT 130 MM HG: CPT | Mod: CPTII,S$GLB,, | Performed by: NURSE PRACTITIONER

## 2022-04-28 PROCEDURE — 3078F PR MOST RECENT DIASTOLIC BLOOD PRESSURE < 80 MM HG: ICD-10-PCS | Mod: CPTII,S$GLB,, | Performed by: NURSE PRACTITIONER

## 2022-04-28 PROCEDURE — 1159F PR MEDICATION LIST DOCUMENTED IN MEDICAL RECORD: ICD-10-PCS | Mod: CPTII,S$GLB,, | Performed by: NURSE PRACTITIONER

## 2022-04-28 PROCEDURE — 1159F MED LIST DOCD IN RCRD: CPT | Mod: CPTII,S$GLB,, | Performed by: NURSE PRACTITIONER

## 2022-04-28 PROCEDURE — 3074F PR MOST RECENT SYSTOLIC BLOOD PRESSURE < 130 MM HG: ICD-10-PCS | Mod: CPTII,S$GLB,, | Performed by: NURSE PRACTITIONER

## 2022-04-28 PROCEDURE — 3008F BODY MASS INDEX DOCD: CPT | Mod: CPTII,S$GLB,, | Performed by: NURSE PRACTITIONER

## 2022-04-28 PROCEDURE — 3078F DIAST BP <80 MM HG: CPT | Mod: CPTII,S$GLB,, | Performed by: NURSE PRACTITIONER

## 2022-04-28 PROCEDURE — 99213 PR OFFICE/OUTPT VISIT, EST, LEVL III, 20-29 MIN: ICD-10-PCS | Mod: S$GLB,,, | Performed by: NURSE PRACTITIONER

## 2022-04-28 RX ORDER — METHYLPREDNISOLONE 4 MG/1
TABLET ORAL
Qty: 21 TABLET | Refills: 0 | Status: SHIPPED | OUTPATIENT
Start: 2022-04-28 | End: 2023-05-18

## 2022-04-28 NOTE — PROGRESS NOTES
Assessment/Plan:  Problem List Items Addressed This Visit        Orthopedic    Lateral epicondylitis of right elbow - Primary    Overview     Patient is a 57-year-old female who presents in clinic today as an established patient here for elbow pain. Patient complains of right elbow pain. Onset of the symptoms was over 1 month ago. She was seen for the same problem during encounter 3/31/22. Inciting event: none know, she reports that prior to onset she was trying to work out and noticed pain while doing push ups. Current symptoms include point tenderness lateral epicondyle. Pain is aggravated by: lifting heavy objects, extension. Patient's overall course: gradually worsening. Patient has had no prior elbow problems. Symptoms have become more bothersome. She reports pain with using mouse and typing at work. She reports it has become difficulty to do normal tasks such as cleaning her home. Imaging was obtained which was overall unremarkable. Treatment to date: Meloxicam which provided minimal improvement.    X-Ray Elbow Complete Right   Dx: Lateral epicondylitis of right elbow    Narrative & Impression  EXAMINATION:  XR ELBOW COMPLETE 3 VIEW RIGHT     CLINICAL HISTORY:  Lateral epicondylitis, right elbow     TECHNIQUE:  AP, lateral, and oblique views of the right elbow were performed.     COMPARISON:  None     FINDINGS:  No right elbow fracture identified.  Alignment is normal.  Joint spaces are preserved.  No osseous erosion or suspicious osseous lesion.  No right elbow effusion identified.     Impression:  As above.     Electronically signed by: Andrew Chew  Date: 3/31/2022  Time: 09:32     Trial of steroids. Supportive care- rest, ice, heat, avoid heavy lifting, limit strenuous activity. Refer to ortho if worsening or no improvement.            Relevant Medications    methylPREDNISolone (MEDROL DOSEPACK) 4 mg tablet    Other Relevant Orders    Ambulatory referral/consult to Orthopedics        Follow up if  symptoms worsen or fail to improve.    Rosa Teague, NP  _____________________________________________________________________________________________________________________________________________________    CC: elbow pain    HPI: Patient is a 57-year-old female who presents in clinic today as an established patient here for elbow pain. Patient complains of right elbow pain. Onset of the symptoms was over 1 month ago. She was seen for the same problem during encounter 3/31/22. Inciting event: none know, she reports that prior to onset she was trying to work out and noticed pain while doing push ups. Current symptoms include point tenderness lateral epicondyle. Pain is aggravated by: lifting heavy objects, extension. Patient's overall course: gradually worsening. Patient has had no prior elbow problems. Symptoms have become more bothersome. She reports pain with using mouse and typing at work. She reports it has become difficulty to do normal tasks such as cleaning her home. Imaging was obtained which was overall unremarkable. Treatment to date: Meloxicam which provided minimal improvement.     Past Medical History:  Past Medical History:   Diagnosis Date    Acne     Acquired hypothyroidism 2020    Chronic.  Stable. Lab Results Component Value Date  TSH 1.993 2020  FREET4 1.08 2019  Patient reports compliance with [place] Thyroid 30 mg.    Diverticulosis     Encounter for blood transfusion     Hyperlipidemia     Hypertension     IBS (irritable bowel syndrome)      Past Surgical History:   Procedure Laterality Date     SECTION      X3    CYSTOSCOPY W/ RETROGRADES N/A 10/2/2019    Procedure: CYSTOSCOPY, WITH RETROGRADE PYELOGRAM;  Surgeon: Fred Aranda MD;  Location: [facility];  Service: Urology;  Laterality: N/A;    EYE SURGERY      HYSTERECTOMY      TUBAL LIGATION  1994     Review of patient's allergies indicates:   Allergen Reactions    Zetia [ezetimibe] Diarrhea     Atorvastatin      Social History     Tobacco Use    Smoking status: Never Smoker    Smokeless tobacco: Never Used   Substance Use Topics    Alcohol use: Yes     Alcohol/week: 4.0 standard drinks     Types: 4 Glasses of wine per week     Comment: daily    Drug use: Never     Family History   Problem Relation Age of Onset    Breast cancer Mother     Hyperlipidemia Mother     Hypertension Mother     Diabetes Mother     Cancer Mother         Breast    Diverticulitis Mother     Alzheimer's disease Mother     Depression Mother     Hearing loss Mother     Heart disease Mother     Hypertension Father     Hyperlipidemia Father     Stroke Father     Hearing loss Father     Heart disease Father     Diverticulitis Sister     Breast cancer Maternal Aunt     Breast cancer Paternal Aunt     Cancer Brother         Nasal pharyngeal    Cancer Brother         Bladder    Cancer Brother         Nasopharyngeal - bladder     Current Outpatient Medications on File Prior to Visit   Medication Sig Dispense Refill    aspirin (ECOTRIN) 81 MG EC tablet Take 1 tablet (81 mg total) by mouth once daily. 90 tablet 3    clotrimazole-betamethasone 1-0.05% (LOTRISONE) cream Apply topically 2 (two) times daily. 1 Tube 1    dicyclomine (BENTYL) 10 MG capsule daily as needed.       estrogens,conjugated,-methyltestosterone 0.625-1.25mg (ESTRATEST HS) 0.625-1.25 mg per tablet Take 1 tablet by mouth once daily.      hydroCHLOROthiazide (HYDRODIURIL) 12.5 MG Tab Take 1 tablet (12.5 mg total) by mouth every other day. 45 tablet 0    levocetirizine (XYZAL) 5 MG tablet Take 5 mg by mouth every evening.      multivit,iron,minerals/lutein (CENTRUM SILVER ULTRA WOMEN'S ORAL)       omega 3-dha-epa-fish oil (FISH OIL) 1,000 mg (120 mg-180 mg) Cap Take as directed.      omeprazole (PRILOSEC) 40 MG capsule TAKE 1 CAPSULE BY MOUTH EVERY DAY 90 capsule 0    ONEXTON 1.2 %(1 % base) -3.75 % GlwP Use as directed. 1 Bottle 1     "pravastatin (PRAVACHOL) 40 MG tablet TAKE 1 TABLET BY MOUTH EVERY DAY 90 tablet 3    Saccharomyces boulardii (FLORASTOR) 250 mg capsule Take 250 mg by mouth once daily.      thyroid, pork, (ARMOUR THYROID) 30 mg Tab TAKE 1 TABLET (30 MG TOTAL) BY MOUTH BEFORE BREAKFAST. 90 tablet 4    triamcinolone acetonide (NASACORT AQ NASL) 2 Squirts by Nasal route Daily.      [DISCONTINUED] meloxicam (MOBIC) 15 MG tablet Take 1 tablet (15 mg total) by mouth daily as needed for Pain. 30 tablet 0     No current facility-administered medications on file prior to visit.     Review of Systems   Constitutional: Negative for appetite change, chills, fatigue and fever.   HENT: Negative for congestion, rhinorrhea and sore throat.    Eyes: Negative for visual disturbance.   Respiratory: Negative for shortness of breath.    Cardiovascular: Negative for chest pain, palpitations and leg swelling.   Gastrointestinal: Negative for abdominal pain, diarrhea and vomiting.   Musculoskeletal: Positive for arthralgias. Negative for myalgias.   Skin: Negative for rash and wound.   Neurological: Negative for dizziness and headaches.   Psychiatric/Behavioral: Negative for behavioral problems. The patient is not nervous/anxious.      Vitals:    04/28/22 0733   BP: 128/70   Pulse: (!) 57   Resp: 16   Temp: 97.7 °F (36.5 °C)   TempSrc: Temporal   SpO2: 98%   Weight: 64.7 kg (142 lb 11.2 oz)   Height: 5' 6" (1.676 m)     Wt Readings from Last 3 Encounters:   04/28/22 64.7 kg (142 lb 11.2 oz)   03/31/22 65.7 kg (144 lb 14.4 oz)   08/27/21 64.6 kg (142 lb 6.7 oz)     Physical Exam  Vitals reviewed.   Constitutional:       General: She is not in acute distress.     Appearance: Normal appearance. She is not ill-appearing.   HENT:      Head: Normocephalic and atraumatic.      Right Ear: External ear normal.      Left Ear: External ear normal.   Eyes:      Extraocular Movements: Extraocular movements intact.      Conjunctiva/sclera: Conjunctivae normal. "   Cardiovascular:      Rate and Rhythm: Normal rate.      Heart sounds: Normal heart sounds.   Pulmonary:      Effort: Pulmonary effort is normal. No respiratory distress.      Breath sounds: Normal breath sounds.   Abdominal:      General: Abdomen is flat. There is no distension.   Musculoskeletal:         General: Normal range of motion.      Right elbow: No swelling or deformity. Normal range of motion. Tenderness present in lateral epicondyle.      Cervical back: Normal range of motion.   Skin:     General: Skin is warm and dry.      Capillary Refill: Capillary refill takes less than 2 seconds.      Coloration: Skin is not pale.      Findings: No rash.   Neurological:      Mental Status: She is alert and oriented to person, place, and time. Mental status is at baseline.   Psychiatric:         Mood and Affect: Mood normal.         Speech: Speech normal.         Behavior: Behavior normal. Behavior is cooperative.       Health Maintenance   Topic Date Due    Mammogram  02/24/2023    TETANUS VACCINE  02/09/2027    Lipid Panel  03/18/2027    Hepatitis C Screening  Completed

## 2022-05-19 ENCOUNTER — OFFICE VISIT (OUTPATIENT)
Dept: ORTHOPEDICS | Facility: CLINIC | Age: 57
End: 2022-05-19
Payer: COMMERCIAL

## 2022-05-19 VITALS
SYSTOLIC BLOOD PRESSURE: 133 MMHG | BODY MASS INDEX: 23.2 KG/M2 | HEIGHT: 66 IN | WEIGHT: 144.38 LBS | HEART RATE: 61 BPM | DIASTOLIC BLOOD PRESSURE: 87 MMHG

## 2022-05-19 DIAGNOSIS — M77.11 LATERAL EPICONDYLITIS OF RIGHT ELBOW: ICD-10-CM

## 2022-05-19 PROCEDURE — 3008F PR BODY MASS INDEX (BMI) DOCUMENTED: ICD-10-PCS | Mod: CPTII,S$GLB,, | Performed by: PHYSICIAN ASSISTANT

## 2022-05-19 PROCEDURE — 3008F BODY MASS INDEX DOCD: CPT | Mod: CPTII,S$GLB,, | Performed by: PHYSICIAN ASSISTANT

## 2022-05-19 PROCEDURE — 3079F DIAST BP 80-89 MM HG: CPT | Mod: CPTII,S$GLB,, | Performed by: PHYSICIAN ASSISTANT

## 2022-05-19 PROCEDURE — 1160F RVW MEDS BY RX/DR IN RCRD: CPT | Mod: CPTII,S$GLB,, | Performed by: PHYSICIAN ASSISTANT

## 2022-05-19 PROCEDURE — 1159F PR MEDICATION LIST DOCUMENTED IN MEDICAL RECORD: ICD-10-PCS | Mod: CPTII,S$GLB,, | Performed by: PHYSICIAN ASSISTANT

## 2022-05-19 PROCEDURE — 20605 PR DRAIN/INJECT INTERMEDIATE JOINT/BURSA: ICD-10-PCS | Mod: RT,S$GLB,, | Performed by: PHYSICIAN ASSISTANT

## 2022-05-19 PROCEDURE — 99203 OFFICE O/P NEW LOW 30 MIN: CPT | Mod: 25,S$GLB,, | Performed by: PHYSICIAN ASSISTANT

## 2022-05-19 PROCEDURE — 99203 PR OFFICE/OUTPT VISIT, NEW, LEVL III, 30-44 MIN: ICD-10-PCS | Mod: 25,S$GLB,, | Performed by: PHYSICIAN ASSISTANT

## 2022-05-19 PROCEDURE — 20605 DRAIN/INJ JOINT/BURSA W/O US: CPT | Mod: RT,S$GLB,, | Performed by: PHYSICIAN ASSISTANT

## 2022-05-19 PROCEDURE — 99999 PR PBB SHADOW E&M-EST. PATIENT-LVL V: CPT | Mod: PBBFAC,,, | Performed by: PHYSICIAN ASSISTANT

## 2022-05-19 PROCEDURE — 99999 PR PBB SHADOW E&M-EST. PATIENT-LVL V: ICD-10-PCS | Mod: PBBFAC,,, | Performed by: PHYSICIAN ASSISTANT

## 2022-05-19 PROCEDURE — 3075F SYST BP GE 130 - 139MM HG: CPT | Mod: CPTII,S$GLB,, | Performed by: PHYSICIAN ASSISTANT

## 2022-05-19 PROCEDURE — 1160F PR REVIEW ALL MEDS BY PRESCRIBER/CLIN PHARMACIST DOCUMENTED: ICD-10-PCS | Mod: CPTII,S$GLB,, | Performed by: PHYSICIAN ASSISTANT

## 2022-05-19 PROCEDURE — 1159F MED LIST DOCD IN RCRD: CPT | Mod: CPTII,S$GLB,, | Performed by: PHYSICIAN ASSISTANT

## 2022-05-19 PROCEDURE — 3079F PR MOST RECENT DIASTOLIC BLOOD PRESSURE 80-89 MM HG: ICD-10-PCS | Mod: CPTII,S$GLB,, | Performed by: PHYSICIAN ASSISTANT

## 2022-05-19 PROCEDURE — 3075F PR MOST RECENT SYSTOLIC BLOOD PRESS GE 130-139MM HG: ICD-10-PCS | Mod: CPTII,S$GLB,, | Performed by: PHYSICIAN ASSISTANT

## 2022-05-19 RX ORDER — BETAMETHASONE SODIUM PHOSPHATE AND BETAMETHASONE ACETATE 3; 3 MG/ML; MG/ML
6 INJECTION, SUSPENSION INTRA-ARTICULAR; INTRALESIONAL; INTRAMUSCULAR; SOFT TISSUE
Status: COMPLETED | OUTPATIENT
Start: 2022-05-19 | End: 2022-05-19

## 2022-05-19 RX ADMIN — BETAMETHASONE SODIUM PHOSPHATE AND BETAMETHASONE ACETATE 6 MG: 3; 3 INJECTION, SUSPENSION INTRA-ARTICULAR; INTRALESIONAL; INTRAMUSCULAR; SOFT TISSUE at 01:05

## 2022-05-19 NOTE — PROGRESS NOTES
Chief Complaint & History of Present Illness:  Viridiana Ross is a New patient 57 y.o. female who is seen here today with a complaint of    Chief Complaint   Patient presents with    Right Elbow - Pain    .  Patient is here today for continuing care treatment for lateral epicondylitis of the right elbow was seen by her primary care initially 03/31/2022 has undergone multiple conservative treatments to both include over-the-counter and prescription NSAIDs Tylenol ice heat rest activity modifications with little to no relief  On a scale of 1-10, with 10 being worst pain imaginable, he rates this pain as 2 on good days and 4 on bad days.  she describes the pain as tender.    Review of patient's allergies indicates:   Allergen Reactions    Zetia [ezetimibe] Diarrhea    Atorvastatin          Current Outpatient Medications   Medication Sig Dispense Refill    aspirin (ECOTRIN) 81 MG EC tablet Take 1 tablet (81 mg total) by mouth once daily. 90 tablet 3    clotrimazole-betamethasone 1-0.05% (LOTRISONE) cream Apply topically 2 (two) times daily. 1 Tube 1    dicyclomine (BENTYL) 10 MG capsule daily as needed.       estrogens,esterified,-methyltestosterone 0.625-1.25mg (ESTRATEST HS) per tablet Take 1 tablet by mouth once daily. 30 tablet 5    hydroCHLOROthiazide (HYDRODIURIL) 12.5 MG Tab Take 1 tablet (12.5 mg total) by mouth every other day. 45 tablet 0    levocetirizine (XYZAL) 5 MG tablet Take 5 mg by mouth every evening.      methylPREDNISolone (MEDROL DOSEPACK) 4 mg tablet follow package directions 21 tablet 0    multivit,iron,minerals/lutein (CENTRUM SILVER ULTRA WOMEN'S ORAL)       omega 3-dha-epa-fish oil (FISH OIL) 1,000 mg (120 mg-180 mg) Cap Take as directed.      omeprazole (PRILOSEC) 40 MG capsule TAKE 1 CAPSULE BY MOUTH EVERY DAY 90 capsule 0    ONEXTON 1.2 %(1 % base) -3.75 % GlwP Use as directed. 1 Bottle 1    pravastatin (PRAVACHOL) 40 MG tablet TAKE 1 TABLET BY MOUTH EVERY DAY 90 tablet 3     Saccharomyces boulardii (FLORASTOR) 250 mg capsule Take 250 mg by mouth once daily.      thyroid, pork, (ARMOUR THYROID) 30 mg Tab TAKE 1 TABLET (30 MG TOTAL) BY MOUTH BEFORE BREAKFAST. 90 tablet 4    triamcinolone acetonide (NASACORT AQ NASL) 2 Squirts by Nasal route Daily.       No current facility-administered medications for this visit.       Past Medical History:   Diagnosis Date    Acne     Acquired hypothyroidism 2020    Chronic.  Stable. Lab Results Component Value Date  TSH 1.993 2020  FREET4 1.08 2019  Patient reports compliance with [place] Thyroid 30 mg.    Diverticulosis     Encounter for blood transfusion     Hyperlipidemia     Hypertension     IBS (irritable bowel syndrome)        Past Surgical History:   Procedure Laterality Date     SECTION      X3    CYSTOSCOPY W/ RETROGRADES N/A 10/02/2019    Procedure: CYSTOSCOPY, WITH RETROGRADE PYELOGRAM;  Surgeon: Fred Aranda MD;  [facility];  Service: Urology;  Laterality: N/A;    EYE SURGERY      TOTAL ABDOMINAL HYSTERECTOMY W/ BILATERAL SALPINGOOPHORECTOMY      MENORRHAGIA    TUBAL LIGATION  1994       Vital Signs (Most Recent)  Vitals:    22 0750   BP: 133/87   Pulse: 61           Review of Systems:  ROS:  Constitutional: no fever or chills, Positive for insomnia  Eyes: no visual changes  ENT: no nasal congestion or sore throat  Respiratory: no cough or shortness of breath, Positive lung nodule  Cardiovascular: no chest pain or palpitations, Nonrheumatic tricuspid valve regurgitation bradycardia  Gastrointestinal: no nausea or vomiting, tolerating diet, Positive IBS, diverticulosis,  Genitourinary: no hematuria or dysuria  Integument/Breast: no rash or pruritis  Hematologic/Lymphatic: no easy bruising or lymphadenopathy  Musculoskeletal: no arthralgias or myalgias  Neurological: no seizures or tremors, Positive non intractable headache  Behavioral/Psych: no auditory or visual  "hallucinations  Endocrine: no heat or cold intolerance, Positive vitamin-D deficiency, acquired hypothyroidism                OBJECTIVE:     PHYSICAL EXAM:  Height: 5' 6" (167.6 cm) Weight: 65.5 kg (144 lb 6.4 oz), General Appearance: Well nourished, well developed, in no acute distress.  Neurological: Mood & affect are normal.  right  Elbow:   History of injury: no, repetitive injury  Pain: Description: moderate and intermittent  Night pain:  yes and mild  Rest pain: yes and mild  Quality: sharp and tender  Location: lateral  Radiates to: arm  Exacerbating factors: activity, lifting, writing and repetitive activity  Mass/swelling: none  Neurological complaints: numbness intermittent  lateral epicondylar tenderness, olecranon tenderness, radial pulse normal, negative Cozen  ROM: flexion arc 0-145  Strength: flexion grade 5 of 5, extension grade 5 of 5, supination grade 5 of 5 and pronation grade 5 of 5      RADIOGRAPHS:  From previous visit reviewed by me today demonstrate no evidence of fracture dislocation joint spaces well maintained no advanced degenerative joint disease or bony abnormalities noted    ASSESSMENT/PLAN:       ICD-10-CM ICD-9-CM   1. Lateral epicondylitis of right elbow  M77.11 726.32       Plan: We discussed with the patient at length all the different treatment options available for her elbow including anti-inflammatories, acetaminophen, rest, ice, physical therapy to include strengthening, range of motion exercise,  splinting,  occasional cortisone injections for temporary relief,  or possible surgical interventions.  Patient has failed conservative treatment with oral medications activity modification and physical therapy.  Will proceed with therapeutic diagnostic cortisone injection of the right elbow.  Patient was advised the risks and benefits injection therapy for her epicondylitis to include infections pain at the injection site and tendon rupture she has expressed her understanding or so " for was treatment    The injection site was identified and the skin was prepared with an ETOH solution. The    right  elbow was injected with 1 ml of Celestone and 1 ml Lidocaine under sterile technique. Viridiana Ross tolerated the procedure well, she was advised to rest the  elbow  today, ice and support. she did receive immediate relief of the pain in and about her  elbow  she was told this would be short lived and is secondary to the lidocaine. she may have an increase in her discomfort tonight followed by steady improvement over the next several days. It may take 1-3 weeks following the injection to get the full benefit of the medication.  I will see her back in 4-6 weeks. Sooner if she has any problems or concerns.

## 2022-05-31 ENCOUNTER — PATIENT MESSAGE (OUTPATIENT)
Dept: FAMILY MEDICINE | Facility: CLINIC | Age: 57
End: 2022-05-31
Payer: COMMERCIAL

## 2022-06-21 ENCOUNTER — TELEPHONE (OUTPATIENT)
Dept: ORTHOPEDICS | Facility: CLINIC | Age: 57
End: 2022-06-21
Payer: COMMERCIAL

## 2022-06-21 NOTE — TELEPHONE ENCOUNTER
Tried contacting patient regarding rescheduling her appointment in orthopedics ,no answer , left message on voice mail

## 2022-06-22 ENCOUNTER — TELEPHONE (OUTPATIENT)
Dept: ORTHOPEDICS | Facility: CLINIC | Age: 57
End: 2022-06-22
Payer: COMMERCIAL

## 2022-06-22 NOTE — TELEPHONE ENCOUNTER
Tried contacting patient several times regarding her appointment in orthopedics on 6-24-22 need to be rescheduled because Penny Najera booked out of clinic that day , no answer left message on voice mail

## 2022-07-15 ENCOUNTER — PATIENT MESSAGE (OUTPATIENT)
Dept: INTERNAL MEDICINE | Facility: CLINIC | Age: 57
End: 2022-07-15
Payer: COMMERCIAL

## 2023-01-01 ENCOUNTER — PATIENT MESSAGE (OUTPATIENT)
Dept: ADMINISTRATIVE | Facility: OTHER | Age: 58
End: 2023-01-01
Payer: COMMERCIAL

## 2023-03-14 DIAGNOSIS — E78.00 PURE HYPERCHOLESTEROLEMIA: Chronic | ICD-10-CM

## 2023-03-14 RX ORDER — PRAVASTATIN SODIUM 40 MG/1
TABLET ORAL
Qty: 90 TABLET | Refills: 1 | Status: SHIPPED | OUTPATIENT
Start: 2023-03-14 | End: 2023-05-18 | Stop reason: SDUPTHER

## 2023-03-14 NOTE — TELEPHONE ENCOUNTER
Care Due:                  Date            Visit Type   Department     Provider  --------------------------------------------------------------------------------                                EP -                              PRIMARY      [facility] FAMILY  Last Visit: 05-      CARE ([facility])   MEDICINE       Ranjeet Coleman  Next Visit: None Scheduled  None         None Found                                                            Last  Test          Frequency    Reason                     Performed    Due Date  --------------------------------------------------------------------------------    Office Visit  12 months..  omeprazole, pravastatin,   05- 05-                             thyroid,.................    CMP.........  12 months..  pravastatin..............  03- 03-    Lipid Panel.  12 months..  pravastatin..............  03- 03-    TSH.........  12 months..  thyroid,.................  03- 03-    [employer] Embedded Care Gaps. Reference number: [id number]. 3/14/2023   1:09:23 PM CDT

## 2023-03-14 NOTE — TELEPHONE ENCOUNTER
Refill Routing Note   Medication(s) are not appropriate for processing by [facility] for the following reason(s):         Patient not seen by PCP within 15 months  Required labs outdated    [facility] action(s):  Defer   Requires appointment : Yes   Requires labs : Yes         Appointments  past 12m or future 3m with PCP    Date Provider   Last Visit   5/13/2021 Ranjeet Coleman MD   Next Visit   Visit date not found Ranjeet Coleman MD   ED visits in past 90 days: 0        Note composed:4:09 PM 03/14/2023

## 2023-05-18 ENCOUNTER — LAB VISIT (OUTPATIENT)
Dept: LAB | Facility: HOSPITAL | Age: 58
End: 2023-05-18
Attending: FAMILY MEDICINE
Payer: COMMERCIAL

## 2023-05-18 ENCOUNTER — OFFICE VISIT (OUTPATIENT)
Dept: FAMILY MEDICINE | Facility: CLINIC | Age: 58
End: 2023-05-18
Payer: COMMERCIAL

## 2023-05-18 VITALS
RESPIRATION RATE: 18 BRPM | WEIGHT: 149 LBS | SYSTOLIC BLOOD PRESSURE: 121 MMHG | DIASTOLIC BLOOD PRESSURE: 83 MMHG | HEART RATE: 45 BPM | HEIGHT: 66 IN | BODY MASS INDEX: 23.95 KG/M2

## 2023-05-18 DIAGNOSIS — Z12.11 COLON CANCER SCREENING: ICD-10-CM

## 2023-05-18 DIAGNOSIS — R29.818 SUSPECTED SLEEP APNEA: ICD-10-CM

## 2023-05-18 DIAGNOSIS — G47.19 EXCESSIVE DAYTIME SLEEPINESS: ICD-10-CM

## 2023-05-18 DIAGNOSIS — Z79.899 ENCOUNTER FOR LONG-TERM (CURRENT) USE OF MEDICATIONS: ICD-10-CM

## 2023-05-18 DIAGNOSIS — Z13.6 ENCOUNTER FOR LIPID SCREENING FOR CARDIOVASCULAR DISEASE: ICD-10-CM

## 2023-05-18 DIAGNOSIS — Z00.00 WELL ADULT EXAM: Primary | ICD-10-CM

## 2023-05-18 DIAGNOSIS — Z00.00 WELL ADULT EXAM: ICD-10-CM

## 2023-05-18 DIAGNOSIS — E78.00 PURE HYPERCHOLESTEROLEMIA: Chronic | ICD-10-CM

## 2023-05-18 DIAGNOSIS — Z13.220 ENCOUNTER FOR LIPID SCREENING FOR CARDIOVASCULAR DISEASE: ICD-10-CM

## 2023-05-18 LAB
ALBUMIN SERPL BCP-MCNC: 4.1 G/DL (ref 3.5–5.2)
ALP SERPL-CCNC: 85 U/L (ref 55–135)
ALT SERPL W/O P-5'-P-CCNC: 18 U/L (ref 10–44)
ANION GAP SERPL CALC-SCNC: 8 MMOL/L (ref 8–16)
AST SERPL-CCNC: 22 U/L (ref 10–40)
BILIRUB SERPL-MCNC: 0.7 MG/DL (ref 0.1–1)
BUN SERPL-MCNC: 18 MG/DL (ref 6–20)
CALCIUM SERPL-MCNC: 10.1 MG/DL (ref 8.7–10.5)
CHLORIDE SERPL-SCNC: 106 MMOL/L (ref 95–110)
CHOLEST SERPL-MCNC: 232 MG/DL (ref 120–199)
CHOLEST/HDLC SERPL: 3.7 {RATIO} (ref 2–5)
CO2 SERPL-SCNC: 27 MMOL/L (ref 23–29)
CREAT SERPL-MCNC: 0.9 MG/DL (ref 0.5–1.4)
ERYTHROCYTE [DISTWIDTH] IN BLOOD BY AUTOMATED COUNT: 12.9 % (ref 11.5–14.5)
EST. GFR  (NO RACE VARIABLE): >60 ML/MIN/1.73 M^2
ESTIMATED AVG GLUCOSE: 100 MG/DL (ref 68–131)
GLUCOSE SERPL-MCNC: 79 MG/DL (ref 70–110)
HBA1C MFR BLD: 5.1 % (ref 4–5.6)
HCT VFR BLD AUTO: 46.6 % (ref 37–48.5)
HDLC SERPL-MCNC: 62 MG/DL (ref 40–75)
HDLC SERPL: 26.7 % (ref 20–50)
HGB BLD-MCNC: 14.8 G/DL (ref 12–16)
LDLC SERPL CALC-MCNC: 154.6 MG/DL (ref 63–159)
MCH RBC QN AUTO: 30.3 PG (ref 27–31)
MCHC RBC AUTO-ENTMCNC: 31.8 G/DL (ref 32–36)
MCV RBC AUTO: 96 FL (ref 82–98)
NONHDLC SERPL-MCNC: 170 MG/DL
PLATELET # BLD AUTO: 249 K/UL (ref 150–450)
PMV BLD AUTO: 10.9 FL (ref 9.2–12.9)
POTASSIUM SERPL-SCNC: 4.2 MMOL/L (ref 3.5–5.1)
PROT SERPL-MCNC: 6.9 G/DL (ref 6–8.4)
RBC # BLD AUTO: 4.88 M/UL (ref 4–5.4)
SODIUM SERPL-SCNC: 141 MMOL/L (ref 136–145)
TRIGL SERPL-MCNC: 77 MG/DL (ref 30–150)
TSH SERPL DL<=0.005 MIU/L-ACNC: 1.41 UIU/ML (ref 0.4–4)
WBC # BLD AUTO: 8.94 K/UL (ref 3.9–12.7)

## 2023-05-18 PROCEDURE — 1159F MED LIST DOCD IN RCRD: CPT | Mod: CPTII,S$GLB,, | Performed by: FAMILY MEDICINE

## 2023-05-18 PROCEDURE — 99999 PR PBB SHADOW E&M-EST. PATIENT-LVL V: ICD-10-PCS | Mod: PBBFAC,,, | Performed by: FAMILY MEDICINE

## 2023-05-18 PROCEDURE — 3008F BODY MASS INDEX DOCD: CPT | Mod: CPTII,S$GLB,, | Performed by: FAMILY MEDICINE

## 2023-05-18 PROCEDURE — 36415 COLL VENOUS BLD VENIPUNCTURE: CPT | Mod: PO | Performed by: FAMILY MEDICINE

## 2023-05-18 PROCEDURE — 3079F DIAST BP 80-89 MM HG: CPT | Mod: CPTII,S$GLB,, | Performed by: FAMILY MEDICINE

## 2023-05-18 PROCEDURE — 3079F PR MOST RECENT DIASTOLIC BLOOD PRESSURE 80-89 MM HG: ICD-10-PCS | Mod: CPTII,S$GLB,, | Performed by: FAMILY MEDICINE

## 2023-05-18 PROCEDURE — 99999 PR PBB SHADOW E&M-EST. PATIENT-LVL V: CPT | Mod: PBBFAC,,, | Performed by: FAMILY MEDICINE

## 2023-05-18 PROCEDURE — 80061 LIPID PANEL: CPT | Performed by: FAMILY MEDICINE

## 2023-05-18 PROCEDURE — 80053 COMPREHEN METABOLIC PANEL: CPT | Performed by: FAMILY MEDICINE

## 2023-05-18 PROCEDURE — 85027 COMPLETE CBC AUTOMATED: CPT | Performed by: FAMILY MEDICINE

## 2023-05-18 PROCEDURE — 84443 ASSAY THYROID STIM HORMONE: CPT | Performed by: FAMILY MEDICINE

## 2023-05-18 PROCEDURE — 99396 PR PREVENTIVE VISIT,EST,40-64: ICD-10-PCS | Mod: S$GLB,,, | Performed by: FAMILY MEDICINE

## 2023-05-18 PROCEDURE — 1160F PR REVIEW ALL MEDS BY PRESCRIBER/CLIN PHARMACIST DOCUMENTED: ICD-10-PCS | Mod: CPTII,S$GLB,, | Performed by: FAMILY MEDICINE

## 2023-05-18 PROCEDURE — 3074F PR MOST RECENT SYSTOLIC BLOOD PRESSURE < 130 MM HG: ICD-10-PCS | Mod: CPTII,S$GLB,, | Performed by: FAMILY MEDICINE

## 2023-05-18 PROCEDURE — 3008F PR BODY MASS INDEX (BMI) DOCUMENTED: ICD-10-PCS | Mod: CPTII,S$GLB,, | Performed by: FAMILY MEDICINE

## 2023-05-18 PROCEDURE — 99396 PREV VISIT EST AGE 40-64: CPT | Mod: S$GLB,,, | Performed by: FAMILY MEDICINE

## 2023-05-18 PROCEDURE — 83036 HEMOGLOBIN GLYCOSYLATED A1C: CPT | Performed by: FAMILY MEDICINE

## 2023-05-18 PROCEDURE — 1160F RVW MEDS BY RX/DR IN RCRD: CPT | Mod: CPTII,S$GLB,, | Performed by: FAMILY MEDICINE

## 2023-05-18 PROCEDURE — 3074F SYST BP LT 130 MM HG: CPT | Mod: CPTII,S$GLB,, | Performed by: FAMILY MEDICINE

## 2023-05-18 PROCEDURE — 1159F PR MEDICATION LIST DOCUMENTED IN MEDICAL RECORD: ICD-10-PCS | Mod: CPTII,S$GLB,, | Performed by: FAMILY MEDICINE

## 2023-05-18 RX ORDER — PRAVASTATIN SODIUM 40 MG/1
40 TABLET ORAL DAILY
Qty: 90 TABLET | Refills: 4 | Status: SHIPPED | OUTPATIENT
Start: 2023-05-18 | End: 2023-10-01 | Stop reason: SDUPTHER

## 2023-05-18 NOTE — PROGRESS NOTES
This note is specifically for wellness visit performed today.     WELLNESS EXAM      Patient ID: Viridiana Ross is a 58 y.o. female with  has a past medical history of Acne, Acquired hypothyroidism (6/18/2020), Diverticulosis, Encounter for blood transfusion, Hyperlipidemia, Hypertension, and IBS (irritable bowel syndrome).     Chief Complaint:  Encounter for wellness exam    Well Adult Physical: Patient here for a comprehensive physical exam.The patient reports no problems.  The patient's last visit with me was on 5/13/2021.    Reviewed Care team:ob gyn- Karina Cuadra MD [facility]  Gastro- Dr. Dominic Wing  Cardiology Dr. Jose  Physical therapySpencer [facility] Marta Casper PA-C    May 2023:  Patient reports that she is doing well other than insomnia not being able to sleep at night.  Patient is restless.  Gaining weight.  Reports that her  states that she stops breathing and gasps for air.  Patient also having excessive daytime sleepiness symptoms.  History of hypertension.  She is been off of her hydrochlorothiazide and blood pressure appears controlled today.  She has never had a sleep study.  Suspected sleep apnea.  Elevated stop Bang score.    Do you take any herbs or supplements that were not prescribed by a doctor?  Yes  Are you taking calcium supplements?  Yes  Are you taking aspirin daily? yes    Hypothyroidism:  Chronic.  Stable.  Patient compliant with [place] Thyroid 30 mg.  Reports that her hair has stop falling out.  Patient interested about stopping thyroid medication.  Lab Results   Component Value Date    TSH 1.662 03/18/2022    FREET4 0.95 12/15/2020        History:  OBGYN: Dr. Jackelyn Jones- GYN for hormones.   LMP: No LMP recorded. Patient has had a hysterectomy.   MMG:    Result:  Mammo Digital Screening Bilat w/ Mykel     History:  Patient is 56 y.o. and is seen for a screening mammogram.        Films Compared:  Prior  images (if available) were compared.      Findings:   This procedure was performed using tomosynthesis.   Computer-aided detection was utilized in the interpretation of this examination.     The breasts are heterogeneously dense, which may obscure small masses. There is no evidence of suspicious masses, microcalcifications or architectural distortion.     Impression:   No mammographic evidence of malignancy.     BI-RADS Category 1: Negative     Recommendation:  Routine screening mammogram in 1 year is recommended.                       Exam Ended: 22 08:46           PAP:  Not indicated  Colonoscopy:  Reportedly done in .  No report available.  Patient was given 10 year recall.  Patient should repeat colonoscopy 2023.    Health Maintenance Topics with due status: Not Due       Topic Last Completion Date    Colorectal Cancer Screening 2013    Influenza Vaccine 10/31/2018    Lipid Panel 2022    TETANUS VACCINE 2022            ==============================================  History reviewed.   Health Maintenance Due   Topic Date Due    Pneumococcal Vaccines (Age 0-64) (1 - PCV) Never done    Shingles Vaccine (1 of 2) Never done    COVID-19 Vaccine (3 - Booster for Moderna series) 2021    Mammogram  2023       Past Medical History:  Past Medical History:   Diagnosis Date    Acne     Acquired hypothyroidism 2020    Chronic.  Stable. Lab Results Component Value Date  TSH 1.993 2020  FREET4 1.08 2019  Patient reports compliance with [place] Thyroid 30 mg.    Diverticulosis     Encounter for blood transfusion     Hyperlipidemia     Hypertension     IBS (irritable bowel syndrome)      Past Surgical History:   Procedure Laterality Date     SECTION      X3    CYSTOSCOPY W/ RETROGRADES N/A 10/02/2019    Procedure: CYSTOSCOPY, WITH RETROGRADE PYELOGRAM;  Surgeon: Fred Aranda MD;  Location: [facility] OR;  Service: Urology;  Laterality: N/A;    EYE  SURGERY  1995    HYSTERECTOMY  2008    TOTAL ABDOMINAL HYSTERECTOMY W/ BILATERAL SALPINGOOPHORECTOMY  2008    MENORRHAGIA    TUBAL LIGATION  05/1994     Review of patient's allergies indicates:   Allergen Reactions    Zetia [ezetimibe] Diarrhea    Atorvastatin      Current Outpatient Medications on File Prior to Visit   Medication Sig Dispense Refill    aspirin (ECOTRIN) 81 MG EC tablet Take 1 tablet (81 mg total) by mouth once daily. 90 tablet 3    clotrimazole-betamethasone 1-0.05% (LOTRISONE) cream Apply topically 2 (two) times daily. 1 Tube 1    dicyclomine (BENTYL) 10 MG capsule daily as needed.       estrogens,esterified,-methyltestosterone 0.625-1.25mg (ESTRATEST HS) per tablet Take 1 tablet by mouth once daily. 30 tablet 5    levocetirizine (XYZAL) 5 MG tablet Take 5 mg by mouth every evening.      multivit,iron,minerals/lutein (CENTRUM SILVER ULTRA WOMEN'S ORAL)       omega 3-dha-epa-fish oil (FISH OIL) 1,000 mg (120 mg-180 mg) Cap Take as directed.      omeprazole (PRILOSEC) 40 MG capsule TAKE 1 CAPSULE BY MOUTH EVERY DAY 90 capsule 4    ONEXTON 1.2 %(1 % base) -3.75 % GlwP Use as directed. 1 Bottle 1    Saccharomyces boulardii (FLORASTOR) 250 mg capsule Take 250 mg by mouth once daily.      thyroid, pork, (ARMOUR THYROID) 30 mg Tab TAKE 1 TABLET BY MOUTH EVERY DAY BEFORE BREAKFAST 90 tablet 2    [DISCONTINUED] pravastatin (PRAVACHOL) 40 MG tablet TAKE 1 TABLET BY MOUTH EVERY DAY 90 tablet 1    [DISCONTINUED] hydroCHLOROthiazide (HYDRODIURIL) 12.5 MG Tab Take 1 tablet (12.5 mg total) by mouth every other day. 45 tablet 0    [DISCONTINUED] methylPREDNISolone (MEDROL DOSEPACK) 4 mg tablet follow package directions 21 tablet 0    [DISCONTINUED] triamcinolone acetonide (NASACORT AQ NASL) 2 Squirts by Nasal route Daily.       No current facility-administered medications on file prior to visit.     Social History     Socioeconomic History    Marital status:     Number of children: 3   Tobacco Use     Smoking status: Never    Smokeless tobacco: Never   Substance and Sexual Activity    Alcohol use: Yes     Alcohol/week: 4.0 standard drinks     Types: 4 Glasses of wine per week     Comment: daily    Drug use: Never    Sexual activity: Yes     Partners: Male     Birth control/protection: Post-menopausal     Social Determinants of Health     Financial Resource Strain: Low Risk     Difficulty of Paying Living Expenses: Not hard at all   Food Insecurity: No Food Insecurity    Worried About Running Out of Food in the Last Year: Never true    Ran Out of Food in the Last Year: Never true   Transportation Needs: No Transportation Needs    Lack of Transportation (Medical): No    Lack of Transportation (Non-Medical): No   Physical Activity: Sufficiently Active    Days of Exercise per Week: 4 days    Minutes of Exercise per Session: 70 min   Stress: No Stress Concern Present    Feeling of Stress : Not at all   Social Connections: Unknown    Frequency of Communication with Friends and Family: More than three times a week    Frequency of Social Gatherings with Friends and Family: More than three times a week    Active Member of Clubs or Organizations: Yes    Attends Club or Organization Meetings: More than 4 times per year    Marital Status:    Housing Stability: Low Risk     Unable to Pay for Housing in the Last Year: No    Number of Places Lived in the Last Year: 1    Unstable Housing in the Last Year: No     Family History   Problem Relation Age of Onset    Breast cancer Mother 85    Hyperlipidemia Mother     Hypertension Mother     Diabetes Mother     Cancer Mother         Breast    Diverticulitis Mother     Alzheimer's disease Mother     Depression Mother     Hearing loss Mother     Heart disease Mother     Hypertension Father     Hyperlipidemia Father     Stroke Father     Hearing loss Father     Heart disease Father     Cancer Father         Prostate    Diverticulitis Sister     Cancer Brother         Nasal  pharyngeal    Cancer Brother         Bladder    Cancer Brother         Nasopharyngeal - bladder    Breast cancer Maternal Aunt     Breast cancer Paternal Aunt           Review of Systems   Constitutional: Negative for chills, fatigue, fever and unexpected weight change.   HENT: Negative for ear pain and sore throat.    Eyes: Negative for redness and visual disturbance.   Respiratory: Negative for cough and shortness of breath.    Cardiovascular: Negative for chest pain and palpitations.   Gastrointestinal: Negative for nausea and vomiting.   Musculoskeletal: Negative for arthralgias and myalgias.   Skin: Negative for rash and wound.   Neurological: Negative for weakness and headaches.         Objective:    Nursing note and vitals reviewed.  Vitals:    05/18/23 1046   BP: 121/83   Pulse: (!) 45   Resp: 18     Body mass index is 24.05 kg/m².     Physical Exam   Constitutional: SHE is oriented to person, place, and time. She appears well-developed and well-nourished. No distress.   HENT:  Narrow airway, right nasal passage nearly block with turbinate edema, congestion  Head: Normocephalic and atraumatic.   Eyes: Pupils are equal, round, and reactive to light. EOM are normal.   Neck: Normal range of motion. Neck supple.   Cardiovascular: Normal rate, regular rhythm, normal heart sounds and intact distal pulses.   No murmur heard.  Pulmonary/Chest: Effort normal and breath sounds normal. No respiratory distress. She has no wheezes.   Musculoskeletal: Normal range of motion. She exhibits no edema.   Neurological: She is alert and oriented to person, place, and time. No cranial nerve deficit.   Skin: Skin is warm and dry. Capillary refill takes less than 2 seconds.   Psychiatric: She has a normal mood and affect. Her behavior is normal.           Assessment / Plan:      1.  ANNUAL WELLNESS EXAM -patient here for annual wellness exam.  Labs ordered.  Health maintenance was reviewed and ordered.  Medications were reviewed  and reconciled.  Suspected sleep apnea set up home sleep study.  Follow-up after for results.  Patient also has sinus symptoms with allergies.  Continue Xyzal.  Add Flonase.    Hyperlipidemia:  Continue pravastatin.Counseled on hyperlipidemia disease course, healthy diet and increased need for exercise.  Please be advised of the risk of cardiovascular disease, increase stroke and heart attack risk with uncontrolled/untreated hyperlipidemia.     Patient voiced understanding and understood the treatment plan. All questions were answered.     Hypothyroidism:  Continue [place] Thyroid.  Check TSH.  Patient interested in stopping medication.  Patient advised to recheck labs if she decides to stop medication.  Monitor for symptoms.Discussed hypothyroidism disease course.  Discussed risks and benefits of medication use.  ER precautions.    GERD RECOMMENDATIONS  Please be advised of condition course.  - Take PPI in the morning 30-60 minutes before breakfast  - I recommend ongoing Education for lifestyle modifications to help control/reduce reflux/abdominal pain including: avoid large meals, avoid eating within 2-3 hours of bedtime (avoid late night eating & lying down soon after eating), elevate head of bed if nocturnal symptoms are present, smoking cessation (if current smoker), & weight loss (if overweight).   - please be advised to avoid known foods which trigger reflux symptoms & to minimize/avoid high-fat foods, chocolate, caffeine, citrus, alcohol, & tomato products.  - Advised to avoid/limit use of NSAID's, since they can cause GI upset, bleeding, and/or ulcers. If needed, take with food.     Complete history and physical was completed today.  Complete and thorough medication reconciliation was performed.  Discussed risks and benefits of medications.  Advised patient on orders and health maintenance.  We discussed old records and old labs if available.  Will request any records not available through epic.  Continue  current medications listed on your summary sheet.    All questions were answered. Patient had no further concerns. Advised of Wellness plan. Follow up in 1 year for ANNUAL WELLNESS EXAM    Orders Placed This Encounter   Procedures    CBC Without Differential     Standing Status:   Future     Number of Occurrences:   1     Standing Expiration Date:   7/16/2024    Comprehensive Metabolic Panel     Standing Status:   Future     Number of Occurrences:   1     Standing Expiration Date:   7/16/2024    TSH     Standing Status:   Future     Number of Occurrences:   1     Standing Expiration Date:   7/16/2024    Hemoglobin A1C     Standing Status:   Future     Number of Occurrences:   1     Standing Expiration Date:   7/16/2024    Lipid Panel     Standing Status:   Future     Number of Occurrences:   1     Standing Expiration Date:   7/16/2024    Ambulatory referral/consult to Endo Procedure      Standing Status:   Future     Standing Expiration Date:   11/18/2023     Referral Priority:   Routine     Referral Type:   Consultation     Number of Visits Requested:   1    Home Sleep Study     Standing Status:   Future     Standing Expiration Date:   5/18/2024       Medications Ordered This Encounter   Medications    pravastatin (PRAVACHOL) 40 MG tablet     Sig: Take 1 tablet (40 mg total) by mouth once daily.     Dispense:  90 tablet     Refill:  4     refill request  03/14/2023 1:06:20 PM            Ranjeet Coleman MD

## 2023-05-18 NOTE — PATIENT INSTRUCTIONS
Follow up in about 1 year (around 5/18/2024), or if symptoms worsen or fail to improve, for Annual Wellness Exam.     Dear patient,   As a result of recent federal legislation (The Federal Cures Act), you may receive lab or pathology results from your visit in your [facility] account before your physician is able to contact you. Your physician or their representative will relay the results to you with their recommendations at their soonest availability.     If no improvement in symptoms or symptoms worsen, please be advised to call MD, follow-up at clinic and/or go to ER if becomes severe.    Ranjeet Coleman M.D.        We Offer TELEHEALTH & Same Day Appointments!   Book your Telehealth appointment with me through my nurse or   Clinic appointments on [facility]!    [address] [place] [address]    Office: [phone]   FAX: [phone]    Check out my Facebook Page and Follow Me at: [email or web address]    Check out my website at [facility] by clicking on: [email or web address]    To Schedule appointments online, go to [facility]: [email or web address]

## 2023-05-20 ENCOUNTER — TELEPHONE (OUTPATIENT)
Dept: PULMONOLOGY | Facility: HOSPITAL | Age: 58
End: 2023-05-20
Payer: COMMERCIAL

## 2023-05-20 NOTE — TELEPHONE ENCOUNTER
----- Message from Rekha Guallpa sent at 5/19/2023  8:38 PM CDT -----  [facility]

## 2023-05-21 NOTE — PROGRESS NOTES
Make follow-up lab appointment per recommendation below.  Check to see if patient has seen the results through my chart.  If not then,  #CALL THE PATIENT# to discuss results/see if they have questions and document verification of contact. Make F/U appt if needed. [phone]    #My interpretation that was sent to them through [facility]:  MINNIE, I have reviewed your recent blood work.     Your complete blood count is normal.    Your metabolic panel which shows your glucose, kidney function, electrolytes, and liver function is normal.   Thyroid study is normal.   Your cholesterol is elevated from previous.  I recommend lifestyle modification with low-fat high-fiber diet and increase in exercise.  Consider increasing pravastatin dosage no improvement.  Your hemoglobin A1c is normal.  This test is gold standard screening test for diabetes.  It is a measures 3 months of your average blood sugar.  =========================  Also please address any outstanding health maintenance that may be due: Mammogram due on 02/24/2023

## 2023-05-23 ENCOUNTER — PATIENT MESSAGE (OUTPATIENT)
Dept: FAMILY MEDICINE | Facility: CLINIC | Age: 58
End: 2023-05-23
Payer: COMMERCIAL

## 2023-06-13 DIAGNOSIS — E03.9 ACQUIRED HYPOTHYROIDISM: ICD-10-CM

## 2023-06-13 RX ORDER — THYROID 30 MG/1
TABLET ORAL
Qty: 90 TABLET | Refills: 1 | Status: SHIPPED | OUTPATIENT
Start: 2023-06-13 | End: 2024-02-16

## 2023-06-13 NOTE — TELEPHONE ENCOUNTER
Refill Routing Note   Medication(s) are not appropriate for processing by [facility] for the following reason(s):      Required labs outdated    [facility] action(s):  Defer None identified   Medication Therapy Plan: t4      Appointments  past 12m or future 3m with PCP    Date Provider   Last Visit   5/18/2023 Ranjeet Coleman MD   Next Visit   Visit date not found Ranjeet Coleman MD   ED visits in past 90 days: 0        Note composed:1:35 PM 06/13/2023

## 2023-06-21 ENCOUNTER — HOSPITAL ENCOUNTER (OUTPATIENT)
Dept: SLEEP MEDICINE | Facility: HOSPITAL | Age: 58
Discharge: HOME | End: 2023-06-21
Attending: FAMILY MEDICINE
Payer: COMMERCIAL

## 2023-06-21 DIAGNOSIS — G47.33 OSA (OBSTRUCTIVE SLEEP APNEA): Primary | ICD-10-CM

## 2023-06-21 DIAGNOSIS — R29.818 SUSPECTED SLEEP APNEA: ICD-10-CM

## 2023-06-21 PROCEDURE — 95800 SLP STDY UNATTENDED: CPT | Mod: 26,,, | Performed by: INTERNAL MEDICINE

## 2023-06-21 PROCEDURE — 95800 PR SLEEP STUDY, UNATTENDED, RECORD HEART RATE/O2 SAT/RESP ANAL/SLEEP TIME: ICD-10-PCS | Mod: 26,,, | Performed by: INTERNAL MEDICINE

## 2023-06-21 NOTE — Clinical Note
PHYSICIAN INTERPRETATION AND COMMENTS: Findings are consistent with severe, positional obstructive sleep apnea(ADRIANO). Therapy indicated. Please refer to sleep disorders clinic CLINICAL HISTORY: 58 year old female presented with: 12.25 inch neck, BMI of 23.7, an Pearl sleepiness score of 19, no co-morbidities and symptoms of nocturnal snoring, waking up choking and witnessed apneas. Based on the clinical history, the patient has a high pre-test probability of having Mild ADRIANO.

## 2023-06-22 NOTE — PROCEDURES
PHYSICIAN INTERPRETATION AND COMMENTS: Findings are consistent with severe, positional obstructive sleep  apnea(ADRIANO). Therapy indicated. Please refer to sleep disorders clinic  CLINICAL HISTORY: 58 year old female presented with: 12.25 inch neck, BMI of 23.7, an Blair sleepiness score of 19, no  co-morbidities and symptoms of nocturnal snoring, waking up choking and witnessed apneas. Based on the clinical  history, the patient has a high pre-test probability of having Mild ADRIANO.  SLEEP STUDY FINDINGS: Patient underwent a 1 night Home Sleep Test and by behavioral criteria, slept for approximately  6.32 hours , with a sleep efficiency of 90%. Severe sleep disordered breathing (AHI=31) is noted based on a 4% hypopnea  desaturation criteria. The patient slept supine 66.4% of the night based on valid recording time of 6.31 hours and is 1.7  times as likely to have apneas/hypopneas when supine. When considering more subtle measures of sleep disordered  breathing, the overall respiratory disturbance index is severe(RDI=48) based on a 1% hypopnea desaturation criteria with  confirmation by surrogate arousal indicators, predominantly in the supine position (56 events/hour). The  apneas/hypopneas are accompanied by minimal oxygen desaturation (percent time below 90% SpO2: 3%, Min SpO2:  80.4%). The average desaturation across all sleep disordered breathing events is 3.6%. Snoring occurs for 3.9% (30 dB) of  the study. The mean pulse rate is 56.9 BPM, with very frequent pulse rate variability (86 events with >= 6 BPM  increase/decrease per hour).  TREATMENT CONSIDERATIONS: Consider nasal continuous positive airway pressure (CPAP/AutoPAP) as the initial  treatment choice for Severe obstructive sleep apnea. A mandibular advancement splint (MAS) or referral to an ENT  surgeon for modification to the airway should be considered to reduce the risk of mortality caused by Severe ADRIANO if the  patient prefers an alternative therapy or  "the CPAP trial is unsuccessful. Based on the ADRIANO Supine data in the study, a  Mandibular Advancement Splint (MAS) will likely provide treatment benefit independent of ADRIANO severity. The patient  should avoid sleeping supine; the non-supine RDI is 1.7 times less severe than the supine RDI.  DISEASE MANAGEMENT CONSIDERATIONS: Insomnia and morning headaches are symptoms that can be associated with  untreated ADRIANO.      Dear Ranjeet Coleman MD  [address] [facility] [address]/Ranjeet Coleman MD         The sleep study that you ordered is complete.  You have ordered sleep LAB services to perform the sleep study for Viridiana Ross .      Please find Sleep Study result in  the "Media tab" of Chart Review menu.        You can look  for the report in the  Media by the document type "Sleep Study Documents". Alphabetizing  "Document type" column helps to find the SLEEP STUDY report  Faster.       As the ordering provider, you are responsible for reviewing the results and implementing a treatment plan with your patient.    If you need a Sleep Medicine provider to explain the sleep study findings and arrange treatment for the patient, please refer patient for consultation to our Sleep Clinic via [facility] with Ambulatory Consult Sleep.     To do that please place an order for an  "Ambulatory Consult Sleep" -  order , it will go to our clinic work queue for our staff  to contact the patient for an appointment.      For any questions, please contact our sleep lab  staff at [phone] to talk to clinical staff          Yuri Wesley MD    "

## 2023-06-26 ENCOUNTER — PATIENT MESSAGE (OUTPATIENT)
Dept: PULMONOLOGY | Facility: CLINIC | Age: 58
End: 2023-06-26
Payer: COMMERCIAL

## 2023-06-29 ENCOUNTER — PATIENT MESSAGE (OUTPATIENT)
Dept: PULMONOLOGY | Facility: CLINIC | Age: 58
End: 2023-06-29

## 2023-06-29 ENCOUNTER — OFFICE VISIT (OUTPATIENT)
Dept: PULMONOLOGY | Facility: CLINIC | Age: 58
End: 2023-06-29
Payer: COMMERCIAL

## 2023-06-29 VITALS
DIASTOLIC BLOOD PRESSURE: 80 MMHG | RESPIRATION RATE: 18 BRPM | HEIGHT: 66 IN | OXYGEN SATURATION: 98 % | BODY MASS INDEX: 24.54 KG/M2 | SYSTOLIC BLOOD PRESSURE: 124 MMHG | WEIGHT: 152.69 LBS | HEART RATE: 63 BPM

## 2023-06-29 DIAGNOSIS — I10 ESSENTIAL HYPERTENSION: ICD-10-CM

## 2023-06-29 DIAGNOSIS — R91.1 LUNG NODULE: ICD-10-CM

## 2023-06-29 DIAGNOSIS — G47.33 OSA (OBSTRUCTIVE SLEEP APNEA): Primary | ICD-10-CM

## 2023-06-29 PROCEDURE — 1159F PR MEDICATION LIST DOCUMENTED IN MEDICAL RECORD: ICD-10-PCS | Mod: CPTII,S$GLB,, | Performed by: PHYSICIAN ASSISTANT

## 2023-06-29 PROCEDURE — 3079F DIAST BP 80-89 MM HG: CPT | Mod: CPTII,S$GLB,, | Performed by: PHYSICIAN ASSISTANT

## 2023-06-29 PROCEDURE — 3008F BODY MASS INDEX DOCD: CPT | Mod: CPTII,S$GLB,, | Performed by: PHYSICIAN ASSISTANT

## 2023-06-29 PROCEDURE — 3044F PR MOST RECENT HEMOGLOBIN A1C LEVEL <7.0%: ICD-10-PCS | Mod: CPTII,S$GLB,, | Performed by: PHYSICIAN ASSISTANT

## 2023-06-29 PROCEDURE — 1160F RVW MEDS BY RX/DR IN RCRD: CPT | Mod: CPTII,S$GLB,, | Performed by: PHYSICIAN ASSISTANT

## 2023-06-29 PROCEDURE — 99999 PR PBB SHADOW E&M-EST. PATIENT-LVL IV: ICD-10-PCS | Mod: PBBFAC,,, | Performed by: PHYSICIAN ASSISTANT

## 2023-06-29 PROCEDURE — 3044F HG A1C LEVEL LT 7.0%: CPT | Mod: CPTII,S$GLB,, | Performed by: PHYSICIAN ASSISTANT

## 2023-06-29 PROCEDURE — 1160F PR REVIEW ALL MEDS BY PRESCRIBER/CLIN PHARMACIST DOCUMENTED: ICD-10-PCS | Mod: CPTII,S$GLB,, | Performed by: PHYSICIAN ASSISTANT

## 2023-06-29 PROCEDURE — 99204 PR OFFICE/OUTPT VISIT, NEW, LEVL IV, 45-59 MIN: ICD-10-PCS | Mod: S$GLB,,, | Performed by: PHYSICIAN ASSISTANT

## 2023-06-29 PROCEDURE — 3074F SYST BP LT 130 MM HG: CPT | Mod: CPTII,S$GLB,, | Performed by: PHYSICIAN ASSISTANT

## 2023-06-29 PROCEDURE — 99999 PR PBB SHADOW E&M-EST. PATIENT-LVL IV: CPT | Mod: PBBFAC,,, | Performed by: PHYSICIAN ASSISTANT

## 2023-06-29 PROCEDURE — 99204 OFFICE O/P NEW MOD 45 MIN: CPT | Mod: S$GLB,,, | Performed by: PHYSICIAN ASSISTANT

## 2023-06-29 PROCEDURE — 3079F PR MOST RECENT DIASTOLIC BLOOD PRESSURE 80-89 MM HG: ICD-10-PCS | Mod: CPTII,S$GLB,, | Performed by: PHYSICIAN ASSISTANT

## 2023-06-29 PROCEDURE — 3008F PR BODY MASS INDEX (BMI) DOCUMENTED: ICD-10-PCS | Mod: CPTII,S$GLB,, | Performed by: PHYSICIAN ASSISTANT

## 2023-06-29 PROCEDURE — 3074F PR MOST RECENT SYSTOLIC BLOOD PRESSURE < 130 MM HG: ICD-10-PCS | Mod: CPTII,S$GLB,, | Performed by: PHYSICIAN ASSISTANT

## 2023-06-29 PROCEDURE — 1159F MED LIST DOCD IN RCRD: CPT | Mod: CPTII,S$GLB,, | Performed by: PHYSICIAN ASSISTANT

## 2023-06-29 NOTE — PROGRESS NOTES
1st check to see if patient has seen the results.  If not then  CALL patient with results and Document verification.  Schedule follow-up if needed.  [phone]  Please set up follow-up with me to discuss abnormal sleep study results.

## 2023-06-29 NOTE — PROGRESS NOTES
Subjective:       Patient ID: Viridiana Ross is a 58 y.o. female.    Chief Complaint: Sleep Apnea    57yo female referred by her PCP for sleep apnea  Recent home sleep study revealed Severe sleep disordered breathing (AHI=31) is noted based on a 4% hypopnea desaturation criteria.  Complains of snoring, witnessed apneas, wakes up un refreshed, has daytime fatigue  Wants to start CPAP    BP Readings from Last 3 Encounters:   06/29/23 124/80   05/26/23 126/80   05/18/23 121/83     [place] Sleepiness Scale TOTAL =   18  (validated sleepiness questionnaire with a higher score indicating greater sleepiness; range 0-24)  [place] SLEEPINESS SCALE 6/29/2023   Sitting and reading 3   Watching TV 3   Sitting, inactive in a public place (e.g. a theatre or a meeting) 2   As a passenger in a car for an hour without a break 3   Lying down to rest in the afternoon when circumstances permit 3   Sitting and talking to someone 1   Sitting quietly after a lunch without alcohol 2   In a car, while stopped for a few minutes in traffic 1   Total score 18         Immunization History   Administered Date(s) Administered    COVID-19, MRNA, LN-S, PF (MODERNA FULL 0.5 ML DOSE) 03/17/2021, 04/22/2021    Influenza 10/31/2018    Tdap 02/09/2017, 09/03/2022      Tobacco Use: Low Risk     Smoking Tobacco Use: Never    Smokeless Tobacco Use: Never    Passive Exposure: Not on file      Past Medical History:   Diagnosis Date    Acne     Acquired hypothyroidism 6/18/2020    Chronic.  Stable. Lab Results Component Value Date  TSH 1.993 02/25/2020  FREET4 1.08 11/25/2019  Patient reports compliance with [place] Thyroid 30 mg.    Diverticulosis     Encounter for blood transfusion     Hyperlipidemia     Hypertension     IBS (irritable bowel syndrome)       Current Outpatient Medications on File Prior to Visit   Medication Sig Dispense Refill    clotrimazole-betamethasone 1-0.05% (LOTRISONE) cream Apply topically 2 (two) times daily. 1 Tube 1    dicyclomine  "(BENTYL) 10 MG capsule daily as needed.       estrogens,esterified,-methyltestosterone 0.625-1.25mg (ESTRATEST HS) per tablet Take 1 tablet by mouth once daily. 30 tablet 5    levocetirizine (XYZAL) 5 MG tablet Take 5 mg by mouth every evening.      multivit,iron,minerals/lutein (CENTRUM SILVER ULTRA WOMEN'S ORAL)       omega 3-dha-epa-fish oil (FISH OIL) 1,000 mg (120 mg-180 mg) Cap Take as directed.      omeprazole (PRILOSEC) 40 MG capsule TAKE 1 CAPSULE BY MOUTH EVERY DAY 90 capsule 4    ONEXTON 1.2 %(1 % base) -3.75 % GlwP Use as directed. 1 Bottle 1    pravastatin (PRAVACHOL) 40 MG tablet Take 1 tablet (40 mg total) by mouth once daily. 90 tablet 4    Saccharomyces boulardii (FLORASTOR) 250 mg capsule Take 250 mg by mouth once daily.      thyroid, pork, (ARMOUR THYROID) 30 mg Tab TAKE 1 TABLET BY MOUTH EVERY DAY BEFORE BREAKFAST 90 tablet 1    aspirin (ECOTRIN) 81 MG EC tablet Take 1 tablet (81 mg total) by mouth once daily. 90 tablet 3     No current facility-administered medications on file prior to visit.        Review of Systems   Constitutional:  Negative for fever, weight loss, appetite change, fatigue and weakness.   HENT:  Negative for postnasal drip, rhinorrhea, sinus pressure, trouble swallowing and congestion.    Respiratory:  Positive for apnea, snoring and somnolence. Negative for cough, sputum production, choking, chest tightness, shortness of breath, wheezing and dyspnea on extertion.    Cardiovascular:  Negative for chest pain and leg swelling.   Musculoskeletal:  Negative for arthralgias, gait problem and joint swelling.   Gastrointestinal:  Negative for nausea, vomiting and abdominal pain.   Neurological:  Negative for dizziness, weakness and headaches.   All other systems reviewed and are negative.    Objective:       Vitals:    06/29/23 1252   BP: 124/80   Pulse: 63   Resp: 18   SpO2: 98%   Weight: 69.3 kg (152 lb 10.7 oz)   Height: 5' 6" (1.676 m)       Physical Exam   Constitutional: She " is oriented to person, place, and time. She appears well-developed and well-nourished. No distress.   HENT:   Head: Normocephalic.   Nose: Nose normal.   Mouth/Throat: Oropharynx is clear and moist.   Cardiovascular: Normal rate and regular rhythm.   Pulmonary/Chest: Effort normal. No respiratory distress. She has no wheezes. She has no rhonchi. She has no rales.   Musculoskeletal:         General: No edema.      Cervical back: Normal range of motion and neck supple.   Neurological: She is alert and oriented to person, place, and time. Gait normal.   Skin: Skin is warm and dry.   Psychiatric: She has a normal mood and affect.   Vitals reviewed.  Personal Diagnostic Review    PHYSICIAN INTERPRETATION AND COMMENTS: Findings are consistent with severe, positional obstructive sleep  apnea(ADRIANO). Therapy indicated. Please refer to sleep disorders clinic  CLINICAL HISTORY: 58 year old female presented with: 12.25 inch neck, BMI of 23.7, an [place] sleepiness score of 19, no  co-morbidities and symptoms of nocturnal snoring, waking up choking and witnessed apneas. Based on the clinical  history, the patient has a high pre-test probability of having Mild ADRIANO.  SLEEP STUDY FINDINGS: Patient underwent a 1 night Home Sleep Test and by behavioral criteria, slept for approximately  6.32 hours , with a sleep efficiency of 90%. Severe sleep disordered breathing (AHI=31) is noted based on a 4% hypopnea  desaturation criteria. The patient slept supine 66.4% of the night based on valid recording time of 6.31 hours and is 1.7  times as likely to have apneas/hypopneas when supine. When considering more subtle measures of sleep disordered  breathing, the overall respiratory disturbance index is severe(RDI=48) based on a 1% hypopnea desaturation criteria with  confirmation by surrogate arousal indicators, predominantly in the supine position (56 events/hour). The  apneas/hypopneas are accompanied by minimal oxygen desaturation (percent  "time below 90% SpO2: 3%, Min SpO2:  80.4%). The average desaturation across all sleep disordered breathing events is 3.6%. Snoring occurs for 3.9% (30 dB) of  the study. The mean pulse rate is 56.9 BPM, with very frequent pulse rate variability (86 events with >= 6 BPM  increase/decrease per hour).      Assessment/Plan:       Problem List Items Addressed This Visit          Pulmonary    Lung nodule     Impression       1. The coronary artery calcium screening score is 47.  This implies definite, at least mild, atherosclerotic plaque deposition within the coronary arteries.  Mild or minimal coronary artery narrowings are likely.  2. 3 mm left lower lobe pulmonary nodule.  For a solid nodule <6 mm, Fleischner Society 2017 guidelines recommend no routine follow up for a low risk patient, or follow-up with non-contrast chest CT at 12 months in a high risk patient.  This report was flagged in Epic as abnormal.      Electronically signed by: Nestor Gale MD  Date: 10/07/2019  Time: 16:45               Cardiac/Vascular    Essential hypertension     Stable, continue current medication management             Other    ADRIANO (obstructive sleep apnea) - Primary     Severe ADRIANO  Start CPAP, nasal mask  Discussed therapeutic goals for CPAP: Ideal usage 100% of nights for 6-8 hours per night. Minimum usage is 70% of night for at least 4 hours per night.           Relevant Orders    CPAP FOR HOME USE   Patient was educated about the symptoms and signs of drowsy driving and about effective countermeasures. Symptoms of drowsy driving include difficulty focusing, frequent blinking, heavy eyelids, daydreaming, wandering/disconnected thoughts, difficulty remembering the last few miles driven (sometimes called "automatic behavior") or missing exits and street signs, frequent yawning, rubbing eyes, difficulty keeping head up, drifting from josue to josue, tailgating or hitting a shoulder, and feeling restless and irritable .  Patient was made  " aware that the ability to self-rate sleepiness and performance is unreliable . Although performance continues to decline with cumulative days of sleep deprivation, subjective ratings of sleepiness tend to level off after the first few days in laboratory conditions of sleep deprivation , and drivers are often unaware of their own level of sleepiness .  Rather than driving while drowsy, patient was told to use other modes of transportation, such as ride sharing, public transportation, taxis, or walking. Importantly, drivers should be advised to plan ahead so that they avoid driving during times of day when they are likely to be sleepy, such as mid-afternoon, late at night, or after a period of sleep deprivation; to keep their trips short (under 20 minutes); and to use alternative modes of transportation.  Patient was warned about the risk of driving while drowsy.  Patient was instructed that patients who are considered high-risk (eg, those with excessive daytime sleepiness and a history of a drowsy driving crash or near miss) should be warned not to drive until therapy has been instituted and proven effective.      Follow up in about 3 months (around 9/29/2023) for new cpap dl.    Discussed diagnosis, its evaluation, treatment and usual course. All questions answered.    Patient verbalized understanding of plan and left in no acute distress    Thank you for the courtesy of participating in the care of this patient    Elizabeth G Blough, PA-C [facility]

## 2023-06-30 PROBLEM — G47.33 OSA (OBSTRUCTIVE SLEEP APNEA): Status: ACTIVE | Noted: 2023-06-30

## 2023-06-30 NOTE — ASSESSMENT & PLAN NOTE
Severe ADRIANO  Start CPAP, nasal mask  Discussed therapeutic goals for CPAP: Ideal usage 100% of nights for 6-8 hours per night. Minimum usage is 70% of night for at least 4 hours per night.

## 2023-06-30 NOTE — ASSESSMENT & PLAN NOTE
Impression       1. The coronary artery calcium screening score is 47.  This implies definite, at least mild, atherosclerotic plaque deposition within the coronary arteries.  Mild or minimal coronary artery narrowings are likely.  2. 3 mm left lower lobe pulmonary nodule.  For a solid nodule <6 mm, Fleischner Society 2017 guidelines recommend no routine follow up for a low risk patient, or follow-up with non-contrast chest CT at 12 months in a high risk patient.  This report was flagged in Epic as abnormal.      Electronically signed by: Nestor Gale MD  Date: 10/07/2019  Time: 16:45

## 2023-07-07 ENCOUNTER — OFFICE VISIT (OUTPATIENT)
Dept: FAMILY MEDICINE | Facility: CLINIC | Age: 58
End: 2023-07-07
Payer: COMMERCIAL

## 2023-07-07 DIAGNOSIS — G47.33 OSA (OBSTRUCTIVE SLEEP APNEA): Primary | ICD-10-CM

## 2023-07-07 DIAGNOSIS — Z79.899 ENCOUNTER FOR LONG-TERM (CURRENT) USE OF MEDICATIONS: ICD-10-CM

## 2023-07-07 PROCEDURE — 1160F RVW MEDS BY RX/DR IN RCRD: CPT | Mod: CPTII,95,, | Performed by: FAMILY MEDICINE

## 2023-07-07 PROCEDURE — 99213 PR OFFICE/OUTPT VISIT, EST, LEVL III, 20-29 MIN: ICD-10-PCS | Mod: 95,,, | Performed by: FAMILY MEDICINE

## 2023-07-07 PROCEDURE — 1160F PR REVIEW ALL MEDS BY PRESCRIBER/CLIN PHARMACIST DOCUMENTED: ICD-10-PCS | Mod: CPTII,95,, | Performed by: FAMILY MEDICINE

## 2023-07-07 PROCEDURE — 1159F MED LIST DOCD IN RCRD: CPT | Mod: CPTII,95,, | Performed by: FAMILY MEDICINE

## 2023-07-07 PROCEDURE — 1159F PR MEDICATION LIST DOCUMENTED IN MEDICAL RECORD: ICD-10-PCS | Mod: CPTII,95,, | Performed by: FAMILY MEDICINE

## 2023-07-07 PROCEDURE — 3044F HG A1C LEVEL LT 7.0%: CPT | Mod: CPTII,95,, | Performed by: FAMILY MEDICINE

## 2023-07-07 PROCEDURE — 3044F PR MOST RECENT HEMOGLOBIN A1C LEVEL <7.0%: ICD-10-PCS | Mod: CPTII,95,, | Performed by: FAMILY MEDICINE

## 2023-07-07 PROCEDURE — 99213 OFFICE O/P EST LOW 20 MIN: CPT | Mod: 95,,, | Performed by: FAMILY MEDICINE

## 2023-07-07 NOTE — ASSESSMENT & PLAN NOTE
Discussed sleep study results with the patient.  Patient is already established care with sleep medicine.  Order for CPAP has been placed.  Patient waiting on supplies and device.  All questions were answered.  Patient will follow-up in a few weeks after starting the device review of her download information.

## 2023-07-07 NOTE — PROGRESS NOTES
Primary Care Telemedicine Note  The patient location is:  Patient's Home - [place]  The chief complaint leading to consultation is:   Chief Complaint   Patient presents with    Sleep Apnea      Total time:  see MDM below. The total time spent on the encounter, which includes face to face time and non-face to face time preparing to see the patient (eg, review of previous medical records, tests), Obtaining and/or reviewing separately obtained history, documenting clinical information in the electronic or other health record, independently interpreting results (not separately reported)/communicating results to the patient/family/caregiver, and/or care coordination (not separately reported).    Visit type: Virtual visit with synchronous audio and video  Each patient to whom he or she provides medical services by telemedicine is:  (1) informed of the relationship between the physician and patient and the respective role of any other health care provider with respect to management of the patient; and (2) notified that he or she may decline to receive medical services by telemedicine and may withdraw from such care at any time.  =================================================================  ==========================================================================  Subjective/Assessment/ Plan:      Patient ID: Viridiana Ross is a 58 y.o. female.  has a past medical history of Acne, Acquired hypothyroidism (6/18/2020), Diverticulosis, Encounter for blood transfusion, Hyperlipidemia, Hypertension, and IBS (irritable bowel syndrome).   Chief Complaint: Sleep Apnea    Problem List Items Addressed This Visit       Encounter for long-term (current) use of medications    Overview     July 2023:  Reviewed labs.   06/27/2019 CHRONIC long-term drug therapy for managed conditions. See medication list. Reports compliance.  No side effects reported.  Routine lab work is being monitored.  Patient does not need refills today.    09/10/2019Update.  Patient here with concerns about her recent lipid panel.  Patient reports compliance with hydrochlorothiazide for hypertension and lower extremity edema.  Blood pressure is low normal today.Patient compliant with other medications as well.  Patient not currently taking anything for cholesterol.Patient does do KETO diet which may have made her cholesterol worse.  November 2019:  CHRONIC. Stable. Compliant with medications for managed conditions. See medication list. No SE reported. Routine lab analysis is being monitored. Refills were addressed.  June 2020:  Patient reports diarrhea to [place]etia.  Patient stop medication and cholesterol has increased.  See problem.  CHRONIC. Stable. Compliant with medications for managed conditions. See medication list. Routine lab analysis is being monitored. Refills were addressed.  December 2020:  Patient reports compliance with pravastatin.  See problem.  She has stop several supplements since previous visit.  See med list.  Lab Results   Component Value Date    WBC 8.94 05/18/2023    HGB 14.8 05/18/2023    HCT 46.6 05/18/2023    MCV 96 05/18/2023     05/18/2023       Chemistry        Component Value Date/Time     05/18/2023 1112    K 4.2 05/18/2023 1112     05/18/2023 1112    CO2 27 05/18/2023 1112    BUN 18 05/18/2023 1112    CREATININE 0.9 05/18/2023 1112    GLU 79 05/18/2023 1112        Component Value Date/Time    CALCIUM 10.1 05/18/2023 1112    ALKPHOS 85 05/18/2023 1112    AST 22 05/18/2023 1112    ALT 18 05/18/2023 1112    BILITOT 0.7 05/18/2023 1112    ESTGFRAFRICA >60.0 03/18/2022 0808    EGFRNONAA >60.0 03/18/2022 0808          Lab Results   Component Value Date    TSH 1.414 05/18/2023    FREET4 0.95 12/15/2020    T3FREE 3.2 12/15/2020            Current Assessment & Plan     Complete history and physical was completed today.  Complete and thorough medication reconciliation was performed.  Discussed risks and benefits of medications.   Advised patient on orders and health maintenance.  We discussed old records and old labs if available.  Will request any records not available through epic.  Continue current medications listed on your summary sheet.           ADRIANO (obstructive sleep apnea) - Primary    Overview     PHYSICIAN INTERPRETATION AND COMMENTS: Findings are consistent with severe, positional obstructive sleep  apnea(ADRIANO). Therapy indicated. Please refer to sleep disorders clinic  CLINICAL HISTORY: 58 year old female presented with: 12.25 inch neck, BMI of 23.7, an [place] sleepiness score of 19, no  co-morbidities and symptoms of nocturnal snoring, waking up choking and witnessed apneas. Based on the clinical  history, the patient has a high pre-test probability of having Mild ADRIANO.  SLEEP STUDY FINDINGS: Patient underwent a 1 night Home Sleep Test and by behavioral criteria, slept for approximately  6.32 hours , with a sleep efficiency of 90%. Severe sleep disordered breathing (AHI=31) is noted based on a 4% hypopnea  desaturation criteria. The patient slept supine 66.4% of the night based on valid recording time of 6.31 hours and is 1.7  times as likely to have apneas/hypopneas when supine. When considering more subtle measures of sleep disordered  breathing, the overall respiratory disturbance index is severe(RDI=48) based on a 1% hypopnea desaturation criteria with  confirmation by surrogate arousal indicators, predominantly in the supine position (56 events/hour). The  apneas/hypopneas are accompanied by minimal oxygen desaturation (percent time below 90% SpO2: 3%, Min SpO2:  80.4%). The average desaturation across all sleep disordered breathing events is 3.6%. Snoring occurs for 3.9% (30 dB) of  the study. The mean pulse rate is 56.9 BPM, with very frequent pulse rate variability (86 events with >= 6 BPM  increase/decrease per hour).  TREATMENT CONSIDERATIONS: Consider nasal continuous positive airway pressure (CPAP/AutoPAP) as the  initial  treatment choice for Severe obstructive sleep apnea. A mandibular advancement splint (MAS) or referral to an ENT  surgeon for modification to the airway should be considered to reduce the risk of mortality caused by Severe ADRIANO if the  patient prefers an alternative therapy or the CPAP trial is unsuccessful. Based on the ADRIANO Supine data in the study, a  Mandibular Advancement Splint (MAS) will likely provide treatment benefit independent of ADRIANO severity. The patient  should avoid sleeping supine; the non-supine RDI is 1.7 times less severe than the supine RDI.  DISEASE MANAGEMENT CONSIDERATIONS: Insomnia and morning headaches are symptoms that can be associated with  untreated ADRIANO.         Current Assessment & Plan     Discussed sleep study results with the patient.  Patient is already established care with sleep medicine.  Order for CPAP has been placed.  Patient waiting on supplies and device.  All questions were answered.  Patient will follow-up in a few weeks after starting the device review of her download information.           I have reviewed the complete PMH, Family History, social history, surgical history, allergies and medications per Epic record at the time of this visit.  Review of Systems   Constitutional:  Positive for fatigue. Negative for chills, fever and unexpected weight change.   HENT:  Negative for ear pain and sore throat.    Eyes:  Negative for redness and visual disturbance.   Respiratory:  Negative for cough and shortness of breath.    Cardiovascular:  Negative for chest pain and palpitations.   Gastrointestinal:  Negative for nausea and vomiting.   Genitourinary:  Negative for difficulty urinating and hematuria.   Musculoskeletal:  Negative for arthralgias and myalgias.   Skin:  Negative for rash and wound.   Neurological:  Negative for weakness and headaches.   Psychiatric/Behavioral:  Positive for sleep disturbance. The patient is not nervous/anxious.   see HPI otherwise  negative  Objective   There were no vitals taken for this visit.  Physical Exam  Constitutional:       General: She is not in acute distress.     Appearance: She is well-developed. She is not ill-appearing, toxic-appearing or diaphoretic.   HENT:      Head: Normocephalic and atraumatic.      Right Ear: Hearing and external ear normal.      Left Ear: Hearing and external ear normal.   Eyes:      General: Lids are normal.      Conjunctiva/sclera: Conjunctivae normal.   Pulmonary:      Effort: Pulmonary effort is normal. No respiratory distress.   Musculoskeletal:         General: Normal range of motion.      Cervical back: Normal range of motion.   Skin:     Coloration: Skin is not pale.   Neurological:      Mental Status: She is alert. She is not disoriented.   Psychiatric:         Attention and Perception: She is attentive.         Mood and Affect: Mood is not anxious or depressed.         Speech: Speech is not rapid and pressured or slurred.         Behavior: Behavior normal. Behavior is not agitated, aggressive or hyperactive. Behavior is cooperative.         Thought Content: Thought content normal. Thought content is not paranoid or delusional. Thought content does not include homicidal or suicidal ideation. Thought content does not include homicidal or suicidal plan.         Cognition and Memory: Memory is not impaired.         Judgment: Judgment normal.     Assessment:     1. ADRIANO (obstructive sleep apnea)    2. Encounter for long-term (current) use of medications      I have signed for the following orders AND/OR meds.  No orders of the defined types were placed in this encounter.        Medication List with Changes/Refills   Current Medications    ASPIRIN (ECOTRIN) 81 MG EC TABLET    Take 1 tablet (81 mg total) by mouth once daily.    CLOTRIMAZOLE-BETAMETHASONE 1-0.05% (LOTRISONE) CREAM    Apply topically 2 (two) times daily.    DICYCLOMINE (BENTYL) 10 MG CAPSULE    daily as needed.      ESTROGENS,ESTERIFIED,-METHYLTESTOSTERONE 0.625-1.25MG (ESTRATEST HS) PER TABLET    Take 1 tablet by mouth once daily.    LEVOCETIRIZINE (XYZAL) 5 MG TABLET    Take 5 mg by mouth every evening.    MULTIVIT,IRON,MINERALS/LUTEIN (CENTRUM SILVER ULTRA WOMEN'S ORAL)        OMEGA 3-DHA-EPA-FISH OIL (FISH OIL) 1,000 MG (120 MG-180 MG) CAP    Take as directed.    OMEPRAZOLE (PRILOSEC) 40 MG CAPSULE    TAKE 1 CAPSULE BY MOUTH EVERY DAY    ONEXTON 1.2 %(1 % BASE) -3.75 % GLWP    Use as directed.    PRAVASTATIN (PRAVACHOL) 40 MG TABLET    Take 1 tablet (40 mg total) by mouth once daily.    SACCHAROMYCES BOULARDII (FLORASTOR) 250 MG CAPSULE    Take 250 mg by mouth once daily.    THYROID, PORK, (ARMOUR THYROID) 30 MG TAB    TAKE 1 TABLET BY MOUTH EVERY DAY BEFORE BREAKFAST     Follow up if symptoms worsen or fail to improve.  Future Appointments       Date Provider Specialty Appt Notes    9/29/2023 Kaela Vann PA-C Pulmonology Follow up in about 3 months (around 9/29/2023) for new cpap dl.            If no improvement in symptoms or symptoms worsen, advised to call/follow-up at clinic or go to ER. Patient voiced understanding and all questions/concerns were addressed.   DISCLAIMER: This note was compiled by using a speech recognition dictation system and therefore please be aware that typographical / speech recognition errors can and do occur.  Please contact me if you see any errors specifically.  Ranjeet Coleman M.D.

## 2023-07-07 NOTE — PATIENT INSTRUCTIONS
Follow up if symptoms worsen or fail to improve.     Dear patient,   As a result of recent federal legislation (The Federal Cures Act), you may receive lab or pathology results from your visit in your [facility] account before your physician is able to contact you. Your physician or their representative will relay the results to you with their recommendations at their soonest availability.     If no improvement in symptoms or symptoms worsen, please be advised to call MD, follow-up at clinic and/or go to ER if becomes severe.    Ranjeet Coleman M.D.        We Offer TELEHEALTH & Same Day Appointments!   Book your Telehealth appointment with me through my nurse or   Clinic appointments on [employer]!    [address] [place] [address]    Office: [phone]   FAX: [phone]    Check out my Facebook Page and Follow Me at: [email or web address]    Check out my website at [email or web address] by clicking on: [email or web address]    To Schedule appointments online, go to [email or web address]: [email or web address]

## 2023-09-29 ENCOUNTER — OFFICE VISIT (OUTPATIENT)
Dept: PULMONOLOGY | Facility: CLINIC | Age: 58
End: 2023-09-29
Payer: COMMERCIAL

## 2023-09-29 VITALS
SYSTOLIC BLOOD PRESSURE: 120 MMHG | DIASTOLIC BLOOD PRESSURE: 80 MMHG | WEIGHT: 155 LBS | RESPIRATION RATE: 17 BRPM | HEIGHT: 66 IN | HEART RATE: 98 BPM | OXYGEN SATURATION: 96 % | BODY MASS INDEX: 24.91 KG/M2

## 2023-09-29 DIAGNOSIS — R91.1 LUNG NODULE: ICD-10-CM

## 2023-09-29 DIAGNOSIS — I10 ESSENTIAL HYPERTENSION: ICD-10-CM

## 2023-09-29 DIAGNOSIS — G47.33 OSA (OBSTRUCTIVE SLEEP APNEA): Primary | ICD-10-CM

## 2023-09-29 PROCEDURE — 1159F PR MEDICATION LIST DOCUMENTED IN MEDICAL RECORD: ICD-10-PCS | Mod: CPTII,S$GLB,, | Performed by: PHYSICIAN ASSISTANT

## 2023-09-29 PROCEDURE — 3074F SYST BP LT 130 MM HG: CPT | Mod: CPTII,S$GLB,, | Performed by: PHYSICIAN ASSISTANT

## 2023-09-29 PROCEDURE — 1159F MED LIST DOCD IN RCRD: CPT | Mod: CPTII,S$GLB,, | Performed by: PHYSICIAN ASSISTANT

## 2023-09-29 PROCEDURE — 99999 PR PBB SHADOW E&M-EST. PATIENT-LVL IV: ICD-10-PCS | Mod: PBBFAC,,, | Performed by: PHYSICIAN ASSISTANT

## 2023-09-29 PROCEDURE — 3044F PR MOST RECENT HEMOGLOBIN A1C LEVEL <7.0%: ICD-10-PCS | Mod: CPTII,S$GLB,, | Performed by: PHYSICIAN ASSISTANT

## 2023-09-29 PROCEDURE — 3008F BODY MASS INDEX DOCD: CPT | Mod: CPTII,S$GLB,, | Performed by: PHYSICIAN ASSISTANT

## 2023-09-29 PROCEDURE — 3074F PR MOST RECENT SYSTOLIC BLOOD PRESSURE < 130 MM HG: ICD-10-PCS | Mod: CPTII,S$GLB,, | Performed by: PHYSICIAN ASSISTANT

## 2023-09-29 PROCEDURE — 99214 OFFICE O/P EST MOD 30 MIN: CPT | Mod: S$GLB,,, | Performed by: PHYSICIAN ASSISTANT

## 2023-09-29 PROCEDURE — 3008F PR BODY MASS INDEX (BMI) DOCUMENTED: ICD-10-PCS | Mod: CPTII,S$GLB,, | Performed by: PHYSICIAN ASSISTANT

## 2023-09-29 PROCEDURE — 1160F PR REVIEW ALL MEDS BY PRESCRIBER/CLIN PHARMACIST DOCUMENTED: ICD-10-PCS | Mod: CPTII,S$GLB,, | Performed by: PHYSICIAN ASSISTANT

## 2023-09-29 PROCEDURE — 1160F RVW MEDS BY RX/DR IN RCRD: CPT | Mod: CPTII,S$GLB,, | Performed by: PHYSICIAN ASSISTANT

## 2023-09-29 PROCEDURE — 99214 PR OFFICE/OUTPT VISIT, EST, LEVL IV, 30-39 MIN: ICD-10-PCS | Mod: S$GLB,,, | Performed by: PHYSICIAN ASSISTANT

## 2023-09-29 PROCEDURE — 99999 PR PBB SHADOW E&M-EST. PATIENT-LVL IV: CPT | Mod: PBBFAC,,, | Performed by: PHYSICIAN ASSISTANT

## 2023-09-29 PROCEDURE — 3079F PR MOST RECENT DIASTOLIC BLOOD PRESSURE 80-89 MM HG: ICD-10-PCS | Mod: CPTII,S$GLB,, | Performed by: PHYSICIAN ASSISTANT

## 2023-09-29 PROCEDURE — 3079F DIAST BP 80-89 MM HG: CPT | Mod: CPTII,S$GLB,, | Performed by: PHYSICIAN ASSISTANT

## 2023-09-29 PROCEDURE — 3044F HG A1C LEVEL LT 7.0%: CPT | Mod: CPTII,S$GLB,, | Performed by: PHYSICIAN ASSISTANT

## 2023-09-29 NOTE — ASSESSMENT & PLAN NOTE
Impression       1. The coronary artery calcium screening score is 47.  This implies definite, at least mild, atherosclerotic plaque deposition within the coronary arteries.  Mild or minimal coronary artery narrowings are likely.  2. 3 mm left lower lobe pulmonary nodule.  For a solid nodule <6 mm, Fleischner Society 2017 guidelines recommend no routine follow up for a low risk patient

## 2023-09-29 NOTE — PROGRESS NOTES
"Subjective:       Patient ID: Viridiana Ross is a 58 y.o. female.    Chief Complaint: Sleep Apnea    9/29/23  Here for ADRIANO on CPAP follow up  Initial compliance download reviewed, days with usage > 4 hours is 95%, average AHI is 5.5  Patient states improved symptoms with use of CPAP. Sleeping more soundly. Waking up feeling more refreshed. Improved daytime sleepiness. Feels cpap is "life changing"  Definitely wants to keep using    6/2023  57yo female referred by her PCP for sleep apnea  Recent home sleep study revealed Severe sleep disordered breathing (AHI=31) is noted based on a 4% hypopnea desaturation criteria.  Complains of snoring, witnessed apneas, wakes up un refreshed, has daytime fatigue  Wants to start CPAP    BP Readings from Last 3 Encounters:   09/29/23 120/80   06/29/23 124/80   05/26/23 126/80     [place] Sleepiness Scale TOTAL =   18  (validated sleepiness questionnaire with a higher score indicating greater sleepiness; range 0-24)  EP[place]TH SLEEPINESS SCALE 6/29/2023   Sitting and reading 3   Watching TV 3   Sitting, inactive in a public place (e.g. a theatre or a meeting) 2   As a passenger in a car for an hour without a break 3   Lying down to rest in the afternoon when circumstances permit 3   Sitting and talking to someone 1   Sitting quietly after a lunch without alcohol 2   In a car, while stopped for a few minutes in traffic 1   Total score 18         Immunization History   Administered Date(s) Administered    COVID-19, MRNA, LN-S, PF (MODERNA FULL 0.5 ML DOSE) 03/17/2021, 04/22/2021    Influenza 10/31/2018    Tdap 02/09/2017, 09/03/2022      Tobacco Use: Low Risk  (9/29/2023)    Patient History     Smoking Tobacco Use: Never     Smokeless Tobacco Use: Never     Passive Exposure: Not on file      Past Medical History:   Diagnosis Date    Acne     Acquired hypothyroidism 6/18/2020    Chronic.  Stable. Lab Results Component Value Date  TSH 1.993 02/25/2020  FREET4 1.08 11/25/2019  Patient " reports compliance with [place] Thyroid 30 mg.    Diverticulosis     Encounter for blood transfusion     Hyperlipidemia     Hypertension     IBS (irritable bowel syndrome)       Current Outpatient Medications on File Prior to Visit   Medication Sig Dispense Refill    aspirin (ECOTRIN) 81 MG EC tablet Take 1 tablet (81 mg total) by mouth once daily. 90 tablet 3    clotrimazole-betamethasone 1-0.05% (LOTRISONE) cream Apply topically 2 (two) times daily. 1 Tube 1    dicyclomine (BENTYL) 10 MG capsule daily as needed.       estrogens,esterified,-methyltestosterone 0.625-1.25mg (ESTRATEST HS) per tablet Take 1 tablet by mouth once daily. 30 tablet 5    levocetirizine (XYZAL) 5 MG tablet Take 5 mg by mouth every evening.      multivit,iron,minerals/lutein (CENTRUM SILVER ULTRA WOMEN'S ORAL)       omega 3-dha-epa-fish oil (FISH OIL) 1,000 mg (120 mg-180 mg) Cap Take as directed.      omeprazole (PRILOSEC) 40 MG capsule TAKE 1 CAPSULE BY MOUTH EVERY DAY 90 capsule 4    ONEXTON 1.2 %(1 % base) -3.75 % GlwP Use as directed. 1 Bottle 1    pravastatin (PRAVACHOL) 40 MG tablet Take 1 tablet (40 mg total) by mouth once daily. 90 tablet 4    Saccharomyces boulardii (FLORASTOR) 250 mg capsule Take 250 mg by mouth once daily.      thyroid, pork, (ARMOUR THYROID) 30 mg Tab TAKE 1 TABLET BY MOUTH EVERY DAY BEFORE BREAKFAST 90 tablet 1     No current facility-administered medications on file prior to visit.        Review of Systems   Constitutional:  Negative for fever, weight loss, appetite change, fatigue and weakness.   HENT:  Negative for postnasal drip, rhinorrhea, sinus pressure, trouble swallowing and congestion.    Respiratory:  Negative for apnea, snoring, cough, sputum production, choking, chest tightness, shortness of breath, wheezing, dyspnea on extertion and somnolence.    Cardiovascular:  Negative for chest pain and leg swelling.   Musculoskeletal:  Negative for arthralgias, gait problem and joint swelling.  "  Gastrointestinal:  Negative for nausea, vomiting and abdominal pain.   Neurological:  Negative for dizziness, weakness and headaches.   All other systems reviewed and are negative.      Objective:       Vitals:    09/29/23 1317   BP: 120/80   Pulse: 98   Resp: 17   SpO2: 96%   Weight: 70.3 kg (154 lb 15.7 oz)   Height: 5' 6" (1.676 m)         Physical Exam   Constitutional: She is oriented to person, place, and time. She appears well-developed and well-nourished. No distress.   HENT:   Head: Normocephalic.   Nose: Nose normal.   Mouth/Throat: Oropharynx is clear and moist.   Cardiovascular: Normal rate and regular rhythm.   Pulmonary/Chest: Effort normal. No respiratory distress. She has no wheezes. She has no rhonchi. She has no rales.   Musculoskeletal:         General: No edema.      Cervical back: Normal range of motion and neck supple.   Neurological: She is alert and oriented to person, place, and time. Gait normal.   Skin: Skin is warm and dry.   Psychiatric: She has a normal mood and affect.   Vitals reviewed.    Personal Diagnostic Review    PHYSICIAN INTERPRETATION AND COMMENTS: Findings are consistent with severe, positional obstructive sleep  apnea(ADRIANO). Therapy indicated. Please refer to sleep disorders clinic  CLINICAL HISTORY: 58 year old female presented with: 12.25 inch neck, BMI of 23.7, an [place] sleepiness score of 19, no  co-morbidities and symptoms of nocturnal snoring, waking up choking and witnessed apneas. Based on the clinical  history, the patient has a high pre-test probability of having Mild ADRIANO.  SLEEP STUDY FINDINGS: Patient underwent a 1 night Home Sleep Test and by behavioral criteria, slept for approximately  6.32 hours , with a sleep efficiency of 90%. Severe sleep disordered breathing (AHI=31) is noted based on a 4% hypopnea  desaturation criteria. The patient slept supine 66.4% of the night based on valid recording time of 6.31 hours and is 1.7  times as likely to have " apneas/hypopneas when supine. When considering more subtle measures of sleep disordered  breathing, the overall respiratory disturbance index is severe(RDI=48) based on a 1% hypopnea desaturation criteria with  confirmation by surrogate arousal indicators, predominantly in the supine position (56 events/hour). The  apneas/hypopneas are accompanied by minimal oxygen desaturation (percent time below 90% SpO2: 3%, Min SpO2:  80.4%). The average desaturation across all sleep disordered breathing events is 3.6%. Snoring occurs for 3.9% (30 dB) of  the study. The mean pulse rate is 56.9 BPM, with very frequent pulse rate variability (86 events with >= 6 BPM  increase/decrease per hour).    Compliance Report  Compliance  Payor Standard  Usage 07/31/2023 - 09/28/2023  Usage days 59/60 days (98%)  >= 4 hours 57 days (95%)  < 4 hours 2 days (3%)  Usage hours 438 hours 13 minutes  Average usage (total days) 7 hours 18 minutes  Average usage (days used) 7 hours 26 minutes  Median usage (days used) 7 hours 38 minutes  Total used hours (value since last reset - 09/28/2023) 489 hours  AirSense 10 AutoSet  Serial number [id number]  Mode AutoSet  Min Pressure 5 cmH2O  Max Pressure 20 cmH2O  EPR Fulltime  EPR level 3  Response Standard  Therapy  Pressure - cmH2O Median: 6.3 95th percentile: 9.1 Maximum: 10.9  Leaks - L/min Median: 0.0 95th percentile: 2.8 Maximum: 14.9  Events per hour AI: 5.3 HI: 0.2 AHI: 5.5  Apnea Index Central: 3.8 Obstructive: 1.3 Unknown: 0.1  RERA Index 0.0  Cheyne-Arredondo respiration (average duration per night) 2 minutes (1%)      Assessment/Plan:       Problem List Items Addressed This Visit          Pulmonary    Lung nodule     Impression       1. The coronary artery calcium screening score is 47.  This implies definite, at least mild, atherosclerotic plaque deposition within the coronary arteries.  Mild or minimal coronary artery narrowings are likely.  2. 3 mm left lower lobe pulmonary nodule.  For a  solid nodule <6 mm, Fleischner Society 2017 guidelines recommend no routine follow up for a low risk patient               Cardiac/Vascular    Essential hypertension     Stable, continue current medication management             Other    ADRIANO (obstructive sleep apnea) - Primary     Compliant with CPAP and benefits from use  Discussed therapeutic goals for CPAP: Ideal usage 100% of nights for 6-8 hours per night. Minimum usage is 70% of night for at least 4 hours per night.           Relevant Orders    CPAP/BIPAP SUPPLIES     Follow up in about 1 year (around 9/29/2024) for ADRIANO follow up.    Discussed diagnosis, its evaluation, treatment and usual course. All questions answered.    Patient verbalized understanding of plan and left in no acute distress    Thank you for the courtesy of participating in the care of this patient    Elizabeth G Blough, PA-C [facility]

## 2023-09-29 NOTE — ASSESSMENT & PLAN NOTE
Compliant with CPAP and benefits from use  Discussed therapeutic goals for CPAP: Ideal usage 100% of nights for 6-8 hours per night. Minimum usage is 70% of night for at least 4 hours per night.

## 2023-10-01 DIAGNOSIS — E78.00 PURE HYPERCHOLESTEROLEMIA: Chronic | ICD-10-CM

## 2023-10-01 NOTE — TELEPHONE ENCOUNTER
No care due was identified.  [facility] Embedded Care Due Messages. Reference number: [id number].   10/01/2023 6:05:35 AM CDT

## 2023-10-02 RX ORDER — PRAVASTATIN SODIUM 40 MG/1
40 TABLET ORAL DAILY
Qty: 90 TABLET | Refills: 2 | Status: SHIPPED | OUTPATIENT
Start: 2023-10-02

## 2023-10-02 NOTE — TELEPHONE ENCOUNTER
Refill Decision Note   Viridiana Ross  is requesting a refill authorization.  Brief Assessment and Rationale for Refill:  Approve     Medication Therapy Plan:         Alert overridden per protocol: Yes   Comments:     Note composed:8:33 AM 10/02/2023             Appointments     Last Visit   7/7/2023 Ranjeet Coleman MD   Next Visit   Visit date not found Ranjeet Coleman MD

## 2023-12-21 DIAGNOSIS — K21.00 GASTROESOPHAGEAL REFLUX DISEASE WITH ESOPHAGITIS WITHOUT HEMORRHAGE: ICD-10-CM

## 2023-12-21 RX ORDER — OMEPRAZOLE 40 MG/1
CAPSULE, DELAYED RELEASE ORAL
Qty: 90 CAPSULE | Refills: 1 | Status: SHIPPED | OUTPATIENT
Start: 2023-12-21

## 2023-12-21 NOTE — TELEPHONE ENCOUNTER
No care due was identified.  [facility] Embedded Care Due Messages. Reference number: [id number].   12/21/2023 8:01:40 AM CST

## 2023-12-21 NOTE — TELEPHONE ENCOUNTER
Refill Decision Note   Viridiana Vandana  is requesting a refill authorization.  Brief Assessment and Rationale for Refill:  Approve     Medication Therapy Plan:         Comments:     Note composed:8:21 AM 12/21/2023

## 2024-02-02 LAB — CRC RECOMMENDATION EXT: NORMAL

## 2024-02-15 DIAGNOSIS — E03.9 ACQUIRED HYPOTHYROIDISM: ICD-10-CM

## 2024-02-15 NOTE — TELEPHONE ENCOUNTER
Care Due:                  Date            Visit Type   Department     Provider  --------------------------------------------------------------------------------                                ESTABLISHED                              PATIENT -    [facility]  Last Visit: 07-      [facility]       Ranjeet Coleman  Next Visit: None Scheduled  None         None Found                                                            Last  Test          Frequency    Reason                     Performed    Due Date  --------------------------------------------------------------------------------    CMP.........  12 months..  pravastatin..............  05- 05-    Lipid Panel.  12 months..  pravastatin..............  05- 05-    TSH.........  12 months..  thyroid,.................  05- 05-    [employer] Embedded Care Due Messages. Reference number: [id number].   2/15/2024 1:40:15 PM CST

## 2024-02-16 RX ORDER — THYROID 30 MG/1
TABLET ORAL
Qty: 90 TABLET | Refills: 1 | Status: SHIPPED | OUTPATIENT
Start: 2024-02-16 | End: 2024-04-25

## 2024-02-16 NOTE — TELEPHONE ENCOUNTER
Provider Staff:  Action required for this patient    Requires labs      Please see care gap opportunities below in Care Due Message.    Thanks!  [facility]     Appointments      Date Provider   Last Visit   7/7/2023 Ranjeet Coleman MD   Next Visit   Visit date not found Ranjeet Coleman MD     Refill Decision Note   Viridiana Ross  is requesting a refill authorization.  Brief Assessment and Rationale for Refill:  Approve     Medication Therapy Plan:         Comments:     Note composed:8:53 AM 02/16/2024

## 2024-03-05 ENCOUNTER — PATIENT OUTREACH (OUTPATIENT)
Dept: ADMINISTRATIVE | Facility: HOSPITAL | Age: 59
End: 2024-03-05
Payer: COMMERCIAL

## 2024-03-05 NOTE — PROGRESS NOTES
Manually uploaded COLON PATHOLOGY 2/2/2024 to media.   BRYCE faxed to Dr. DARA Davies 1x to request 2/2/2024 colonoscopy report. Reminder set 1 week.

## 2024-03-05 NOTE — LETTER
[id number]    AUTHORIZATION FOR RELEASE OF   CONFIDENTIAL INFORMATION    Dear Dr. Davies,    We are seeing Viridiana Ross, date of birth 1965, in the clinic at [facility]. Ranjeet Coleman MD is the patient's PCP. Viridiana Ross has an outstanding lab/procedure at the time we reviewed her chart. In order to help keep her health information updated, she has authorized us to request the following medical record(s):       ( x ) 2024 COLONOSCOPY REPORT    ( x ) COLON RECALL RECOMMENDATION          Please fax records to [facility], [phone]     If you have any questions, please contact    LEONEL Thomas at [phone]           Patient Name: Viridiana Ross  : 1965  Patient Phone #: [phone]

## 2024-03-12 NOTE — PROGRESS NOTES
Population Health Chart Review & Patient Outreach Details      Additional [facility] Notes:      Manually uploaded and hyper-linked COLONOSCOPY & PATHOLOGY 2-2-24           Updates Requested / Reviewed:      Updated Care Coordination Note, Care Everywhere, , and Immunizations Reconciliation Completed or Queried: [facility] Topics Overdue:      [facility] Score: 0     Patient is not due for any topics at this time.    Influenza Vaccine  RSV Vaccine                  Health Maintenance Topic(s) Outreach Outcomes & Actions Taken:    Colorectal Cancer Screening - Outreach Outcomes & Actions Taken  : External Records Uploaded, Care Team Updated, & History Updated if Applicable

## 2024-03-12 NOTE — PROGRESS NOTES
Population Health Chart Review & Patient Outreach Details      Additional [facility] Notes:    2nd attempt  BRYCE faxed to Dr. DARA Davies 1x to request 2/2/2024 colonoscopy report. Reminder set 1 week.             Updates Requested / Reviewed:      Care Everywhere, , Care Team Updated, and Immunizations Reconciliation Completed or Queried: [place]         Health Maintenance Topics Overdue:      VB Score: 1     Colon Cancer Screening    Influenza Vaccine  RSV Vaccine                  Health Maintenance Topic(s) Outreach Outcomes & Actions Taken:    Colorectal Cancer Screening - Outreach Outcomes & Actions Taken  : External Records Requested & Care Team Updated if Applicable

## 2024-04-25 ENCOUNTER — OFFICE VISIT (OUTPATIENT)
Dept: FAMILY MEDICINE | Facility: CLINIC | Age: 59
End: 2024-04-25
Payer: COMMERCIAL

## 2024-04-25 ENCOUNTER — LAB VISIT (OUTPATIENT)
Dept: LAB | Facility: HOSPITAL | Age: 59
End: 2024-04-25
Attending: NURSE PRACTITIONER
Payer: COMMERCIAL

## 2024-04-25 VITALS
SYSTOLIC BLOOD PRESSURE: 118 MMHG | BODY MASS INDEX: 24.19 KG/M2 | HEIGHT: 66 IN | HEART RATE: 75 BPM | WEIGHT: 150.5 LBS | RESPIRATION RATE: 18 BRPM | DIASTOLIC BLOOD PRESSURE: 76 MMHG

## 2024-04-25 DIAGNOSIS — Z00.00 ENCOUNTER FOR WELLNESS EXAMINATION: Primary | ICD-10-CM

## 2024-04-25 DIAGNOSIS — Z79.890 HORMONE REPLACEMENT THERAPY (POSTMENOPAUSAL): ICD-10-CM

## 2024-04-25 DIAGNOSIS — Z00.00 ENCOUNTER FOR WELLNESS EXAMINATION: ICD-10-CM

## 2024-04-25 DIAGNOSIS — K58.0 IRRITABLE BOWEL SYNDROME WITH DIARRHEA: ICD-10-CM

## 2024-04-25 DIAGNOSIS — E55.9 VITAMIN D DEFICIENCY: ICD-10-CM

## 2024-04-25 DIAGNOSIS — Z79.899 ENCOUNTER FOR LONG-TERM (CURRENT) USE OF MEDICATIONS: ICD-10-CM

## 2024-04-25 DIAGNOSIS — E03.9 ACQUIRED HYPOTHYROIDISM: Chronic | ICD-10-CM

## 2024-04-25 DIAGNOSIS — E78.00 PURE HYPERCHOLESTEROLEMIA: Chronic | ICD-10-CM

## 2024-04-25 DIAGNOSIS — I10 ESSENTIAL HYPERTENSION: ICD-10-CM

## 2024-04-25 PROBLEM — R29.818 SUSPECTED SLEEP APNEA: Status: RESOLVED | Noted: 2023-05-18 | Resolved: 2024-04-25

## 2024-04-25 PROBLEM — R00.1 BRADYCARDIA: Status: RESOLVED | Noted: 2019-09-30 | Resolved: 2024-04-25

## 2024-04-25 PROBLEM — G47.19 EXCESSIVE DAYTIME SLEEPINESS: Status: RESOLVED | Noted: 2023-05-18 | Resolved: 2024-04-25

## 2024-04-25 PROBLEM — M77.11 LATERAL EPICONDYLITIS OF RIGHT ELBOW: Status: RESOLVED | Noted: 2022-04-28 | Resolved: 2024-04-25

## 2024-04-25 PROBLEM — R53.83 MALAISE AND FATIGUE: Status: RESOLVED | Noted: 2019-09-10 | Resolved: 2024-04-25

## 2024-04-25 PROBLEM — R53.81 MALAISE AND FATIGUE: Status: RESOLVED | Noted: 2019-09-10 | Resolved: 2024-04-25

## 2024-04-25 PROBLEM — R59.1 LYMPHADENOPATHY: Status: RESOLVED | Noted: 2021-05-13 | Resolved: 2024-04-25

## 2024-04-25 PROBLEM — N95.1 SYMPTOMATIC MENOPAUSAL OR FEMALE CLIMACTERIC STATES: Status: RESOLVED | Noted: 2019-09-10 | Resolved: 2024-04-25

## 2024-04-25 LAB
ALBUMIN SERPL BCP-MCNC: 4.1 G/DL (ref 3.5–5.2)
ALP SERPL-CCNC: 86 U/L (ref 55–135)
ALT SERPL W/O P-5'-P-CCNC: 20 U/L (ref 10–44)
ANION GAP SERPL CALC-SCNC: 7 MMOL/L (ref 8–16)
AST SERPL-CCNC: 21 U/L (ref 10–40)
BILIRUB SERPL-MCNC: 0.7 MG/DL (ref 0.1–1)
BUN SERPL-MCNC: 17 MG/DL (ref 6–20)
CALCIUM SERPL-MCNC: 10.7 MG/DL (ref 8.7–10.5)
CHLORIDE SERPL-SCNC: 107 MMOL/L (ref 95–110)
CHOLEST SERPL-MCNC: 239 MG/DL (ref 120–199)
CHOLEST/HDLC SERPL: 4.5 {RATIO} (ref 2–5)
CO2 SERPL-SCNC: 26 MMOL/L (ref 23–29)
CREAT SERPL-MCNC: 1.1 MG/DL (ref 0.5–1.4)
ERYTHROCYTE [DISTWIDTH] IN BLOOD BY AUTOMATED COUNT: 12.6 % (ref 11.5–14.5)
EST. GFR  (NO RACE VARIABLE): 57.5 ML/MIN/1.73 M^2
ESTIMATED AVG GLUCOSE: 103 MG/DL (ref 68–131)
GLUCOSE SERPL-MCNC: 83 MG/DL (ref 70–110)
HBA1C MFR BLD: 5.2 % (ref 4–5.6)
HCT VFR BLD AUTO: 45.3 % (ref 37–48.5)
HDLC SERPL-MCNC: 53 MG/DL (ref 40–75)
HDLC SERPL: 22.2 % (ref 20–50)
HGB BLD-MCNC: 14.4 G/DL (ref 12–16)
LDLC SERPL CALC-MCNC: 170.2 MG/DL (ref 63–159)
MCH RBC QN AUTO: 30.6 PG (ref 27–31)
MCHC RBC AUTO-ENTMCNC: 31.8 G/DL (ref 32–36)
MCV RBC AUTO: 96 FL (ref 82–98)
NONHDLC SERPL-MCNC: 186 MG/DL
PLATELET # BLD AUTO: 236 K/UL (ref 150–450)
PMV BLD AUTO: 10.9 FL (ref 9.2–12.9)
POTASSIUM SERPL-SCNC: 3.9 MMOL/L (ref 3.5–5.1)
PROT SERPL-MCNC: 7.4 G/DL (ref 6–8.4)
RBC # BLD AUTO: 4.71 M/UL (ref 4–5.4)
SODIUM SERPL-SCNC: 140 MMOL/L (ref 136–145)
TRIGL SERPL-MCNC: 79 MG/DL (ref 30–150)
TSH SERPL DL<=0.005 MIU/L-ACNC: 2.65 UIU/ML (ref 0.4–4)
WBC # BLD AUTO: 6.79 K/UL (ref 3.9–12.7)

## 2024-04-25 PROCEDURE — 3008F BODY MASS INDEX DOCD: CPT | Mod: CPTII,S$GLB,, | Performed by: NURSE PRACTITIONER

## 2024-04-25 PROCEDURE — 36415 COLL VENOUS BLD VENIPUNCTURE: CPT | Mod: PO | Performed by: NURSE PRACTITIONER

## 2024-04-25 PROCEDURE — 80061 LIPID PANEL: CPT | Performed by: NURSE PRACTITIONER

## 2024-04-25 PROCEDURE — 84443 ASSAY THYROID STIM HORMONE: CPT | Performed by: NURSE PRACTITIONER

## 2024-04-25 PROCEDURE — 85027 COMPLETE CBC AUTOMATED: CPT | Performed by: NURSE PRACTITIONER

## 2024-04-25 PROCEDURE — 3078F DIAST BP <80 MM HG: CPT | Mod: CPTII,S$GLB,, | Performed by: NURSE PRACTITIONER

## 2024-04-25 PROCEDURE — 99396 PREV VISIT EST AGE 40-64: CPT | Mod: S$GLB,,, | Performed by: NURSE PRACTITIONER

## 2024-04-25 PROCEDURE — 83036 HEMOGLOBIN GLYCOSYLATED A1C: CPT | Performed by: NURSE PRACTITIONER

## 2024-04-25 PROCEDURE — 80053 COMPREHEN METABOLIC PANEL: CPT | Performed by: NURSE PRACTITIONER

## 2024-04-25 PROCEDURE — 1160F RVW MEDS BY RX/DR IN RCRD: CPT | Mod: CPTII,S$GLB,, | Performed by: NURSE PRACTITIONER

## 2024-04-25 PROCEDURE — 1159F MED LIST DOCD IN RCRD: CPT | Mod: CPTII,S$GLB,, | Performed by: NURSE PRACTITIONER

## 2024-04-25 PROCEDURE — 3074F SYST BP LT 130 MM HG: CPT | Mod: CPTII,S$GLB,, | Performed by: NURSE PRACTITIONER

## 2024-04-25 PROCEDURE — 99999 PR PBB SHADOW E&M-EST. PATIENT-LVL IV: CPT | Mod: PBBFAC,,, | Performed by: NURSE PRACTITIONER

## 2024-04-25 RX ORDER — CYST/ALA/Q10/PHOS.SER/DHA/BROC 100-20-50
POWDER (GRAM) ORAL
COMMUNITY

## 2024-04-25 RX ORDER — LANOLIN ALCOHOL/MO/W.PET/CERES
100 CREAM (GRAM) TOPICAL DAILY
COMMUNITY

## 2024-04-25 RX ORDER — UBIDECARENONE 30 MG
30 CAPSULE ORAL DAILY
COMMUNITY

## 2024-04-25 RX ORDER — PRAVASTATIN SODIUM 40 MG/1
40 TABLET ORAL DAILY
Qty: 90 TABLET | Refills: 3 | Status: SHIPPED | OUTPATIENT
Start: 2024-04-25 | End: 2024-04-26 | Stop reason: ALTCHOICE

## 2024-04-25 NOTE — PROGRESS NOTES
This note is specifically for wellness visit performed today.     WELLNESS EXAM      Patient ID: Viridiana Ross is a 59 y.o. female with  has a past medical history of Acne, Acquired hypothyroidism (6/18/2020), Diverticulosis, Encounter for blood transfusion, Hyperlipidemia, Hypertension, and IBS (irritable bowel syndrome).     Chief Complaint:  Encounter for wellness exam    Well Adult Physical: Patient is a 59-year-old female who presents here for a comprehensive physical exam.The patient reports chronic problems stable. Chronic condition has been reviewed. Further details as stated below:     Reviewed Care team:  ob gyn- Karina Cuadra MD [facility]  Gastro- Dr. Jesús Davies [place]  Cardiology Dr. Jose  Physical therapySpencer Cuellar [facility] Marta Casper PA-C    April 2024:  Patient reports that she is doing well. States she is currently being treated with Xifaxan per GI, [facility] . 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 She states she researched this and attributes it to long term use of Omeprazole and history of C.Diff. States she has made dietary changes, no dairy, gluten, sugars. BRYCE completed. Records requested.     Hypertension:  CHRONIC. STABLE. BP Reviewed. Not currently on any BP medications. No SE reported.   (-) CP, SOB, palpitations, dizziness, lightheadedness, HA, arm numbness, tingling or weakness, syncope.  Creatinine   Date Value Ref Range Status   05/18/2023 0.9 0.5 - 1.4 mg/dL Final     Hyperlipidemia: taking low dose ASA, statin, fish oil  CHRONIC. STABLE. Lab analysis reviewed.   (-) CP, SOB, abdominal pain, N/V/D, constipation, jaundice, skin changes.  (-) Myalgias  Lab Results   Component Value Date    CHOL 232 (H) 05/18/2023    CHOL 220 (H) 03/18/2022    CHOL 207 (H) 03/19/2021     Lab Results   Component Value Date    HDL  62 05/18/2023    HDL 73 03/18/2022    HDL 89 (H) 03/19/2021     Lab Results   Component Value Date    LDLCALC 154.6 05/18/2023    LDLCALC 137.2 03/18/2022    LDLCALC 110.2 03/19/2021     Lab Results   Component Value Date    TRIG 77 05/18/2023    TRIG 49 03/18/2022    TRIG 39 03/19/2021     Lab Results   Component Value Date    CHOLHDL 26.7 05/18/2023    CHOLHDL 33.2 03/18/2022    CHOLHDL 43.0 03/19/2021     Lab Results   Component Value Date    TOTALCHOLEST 3.7 05/18/2023    TOTALCHOLEST 3.0 03/18/2022    TOTALCHOLEST 2.3 03/19/2021     Lab Results   Component Value Date    ALT 18 05/18/2023    AST 22 05/18/2023    ALKPHOS 85 05/18/2023    BILITOT 0.7 05/18/2023     ======================================================  The 10-year ASCVD risk score (Jami BAIN, et al., 2019) is: 2.9%    Values used to calculate the score:      Age: 59 years      Sex: Female      Is Non- : No      Diabetic: No      Tobacco smoker: No      Systolic Blood Pressure: 118 mmHg      Is BP treated: No      HDL Cholesterol: 62 mg/dL      Total Cholesterol: 232 mg/dL    Hyperlipidemia Medications               omega 3-dha-epa-fish oil (FISH OIL) 1,000 mg (120 mg-180 mg) Cap Take as directed.    pravastatin (PRAVACHOL) 40 MG tablet Take 1 tablet (40 mg total) by mouth once daily.     Hypothyroidism:  Chronic.  Stable.  Previously on [place] Thyroid 30 mg.  Reports that her hair has been falling out and suspects thyroid levels may be out of range. Patient stopped medication after stopping omeprazole. She reports omeprazole is linked to hypothyroidism.   Lab Results   Component Value Date    TSH 1.414 05/18/2023    FREET4 0.95 12/15/2020     Do you take any herbs or supplements that were not prescribed by a doctor?  Yes- Vitamin K2, CoQ 10, Vitamin B12, Vitamin D  Are you taking calcium supplements?  Yes  Are you taking aspirin daily? yes     History:  OBGYN: Dr. Jackelyn Jones- GYN for hormones. On oral estrogen.    LMP: No LMP recorded. Patient has had a hysterectomy.   MMG:  Mammogram due. States she is planning to have mammogram and DEXA scan in  through GYN.  Narrative & Impression  Result:  Mammo Digital Screening Bilat w/ Mykel     History:  Patient is 58 y.o. and is seen for a screening mammogram.        Films Compared:  Prior images (if available) were compared.      Findings:   This procedure was performed using tomosynthesis.   Computer-aided detection was utilized in the interpretation of this examination.     The breasts are heterogeneously dense, which may obscure small masses. There is no evidence of suspicious masses, microcalcifications or architectural distortion.     Impression:   No mammographic evidence of malignancy.     BI-RADS Category 1: Negative     Recommendation:  Routine screening mammogram in 1 year is recommended.           Exam Ended: 23 09:52 CDT Last Resulted: 23 10:13 CDT           PAP:  Not indicated; hysterectomy     Colonoscopy: Completed 2024; 10 year surveillance     Health Maintenance Topics with due status: Not Due       Topic Last Completion Date    TETANUS VACCINE 2022    Lipid Panel 2023    Colorectal Cancer Screening 2024    High Dose Statin 2024      ==============================================  History reviewed.   Health Maintenance Due   Topic Date Due    RSV Vaccine (Age 60+ and Pregnant patients) (1 - 1-dose 60+ series) Never done    Mammogram  2024   Advised of due vaccines     Past Medical History:  Past Medical History:   Diagnosis Date    Acne     Acquired hypothyroidism 2020    Chronic.  Stable. Lab Results Component Value Date  TSH 1.993 2020  FREET4 1.08 2019  Patient reports compliance with [place] Thyroid 30 mg.    Diverticulosis     Encounter for blood transfusion     Hyperlipidemia     Hypertension     IBS (irritable bowel syndrome)      Past Surgical History:   Procedure Laterality Date      SECTION      X3    CYSTOSCOPY W/ RETROGRADES N/A 10/02/2019    Procedure: CYSTOSCOPY, WITH RETROGRADE PYELOGRAM;  Surgeon: Fred Aranda MD;  Location: [facility];  Service: Urology;  Laterality: N/A;    EYE SURGERY  1995    HYSTERECTOMY  2008    TOTAL ABDOMINAL HYSTERECTOMY W/ BILATERAL SALPINGOOPHORECTOMY  2008    MENORRHAGIA    TUBAL LIGATION  05/1994     Review of patient's allergies indicates:   Allergen Reactions    Zetia [ezetimibe] Diarrhea    Atorvastatin      Current Outpatient Medications on File Prior to Visit   Medication Sig Dispense Refill    dicyclomine (BENTYL) 10 MG capsule daily as needed.       estrogens,esterified,-methyltestosterone 0.625-1.25mg (ESTRATEST HS) per tablet Take 1 tablet by mouth once daily. 30 tablet 5    levocetirizine (XYZAL) 5 MG tablet Take 5 mg by mouth every evening.      multivit,iron,minerals/lutein (CENTRUM SILVER ULTRA WOMEN'S ORAL)       omega 3-dha-epa-fish oil (FISH OIL) 1,000 mg (120 mg-180 mg) Cap Take as directed.      ONEXTON 1.2 %(1 % base) -3.75 % GlwP Use as directed. 1 Bottle 1    Saccharomyces boulardii (FLORASTOR) 250 mg capsule Take 250 mg by mouth once daily.      [DISCONTINUED] pravastatin (PRAVACHOL) 40 MG tablet Take 1 tablet (40 mg total) by mouth once daily. 90 tablet 2    aspirin (ECOTRIN) 81 MG EC tablet Take 1 tablet (81 mg total) by mouth once daily. 90 tablet 3    co-enzyme Q-10 30 mg capsule Take 30 mg by mouth once daily.      cyanocobalamin (VITAMIN B-12) 1000 MCG tablet Take 100 mcg by mouth once daily.      vitamin K2 45 mcg Cap Take by mouth.      [DISCONTINUED] clotrimazole-betamethasone 1-0.05% (LOTRISONE) cream Apply topically 2 (two) times daily. 1 Tube 1    [DISCONTINUED] omeprazole (PRILOSEC) 40 MG capsule TAKE 1 CAPSULE BY MOUTH EVERY DAY 90 capsule 1    [DISCONTINUED] thyroid, pork, (ARMOUR THYROID) 30 mg Tab TAKE 1 TABLET BY MOUTH EVERY DAY BEFORE BREAKFAST 90 tablet 1     No current facility-administered medications on file  prior to visit.     Social History     Socioeconomic History    Marital status:     Number of children: 3   Tobacco Use    Smoking status: Never    Smokeless tobacco: Never   Substance and Sexual Activity    Alcohol use: Yes     Alcohol/week: 4.0 standard drinks of alcohol     Types: 4 Glasses of wine per week     Comment: daily    Drug use: Never    Sexual activity: Yes     Partners: Male     Birth control/protection: Post-menopausal     Social Determinants of Health     Financial Resource Strain: Low Risk  (4/22/2024)    Overall Financial Resource Strain (CARDIA)     Difficulty of Paying Living Expenses: Not hard at all   Food Insecurity: No Food Insecurity (4/22/2024)    Hunger Vital Sign     Worried About Running Out of Food in the Last Year: Never true     Ran Out of Food in the Last Year: Never true   Transportation Needs: No Transportation Needs (4/22/2024)    PRAPARE - Transportation     Lack of Transportation (Medical): No     Lack of Transportation (Non-Medical): No   Physical Activity: Sufficiently Active (4/22/2024)    Exercise Vital Sign     Days of Exercise per Week: 4 days     Minutes of Exercise per Session: 60 min   Stress: No Stress Concern Present (4/22/2024)    [employer] - Occupational Stress Questionnaire     Feeling of Stress : Not at all   Social Connections: Unknown (4/22/2024)    Social Connection and Isolation Panel [NHANES]     Frequency of Communication with Friends and Family: More than three times a week     Frequency of Social Gatherings with Friends and Family: Three times a week     Active Member of Clubs or Organizations: Yes     Attends Club or Organization Meetings: 1 to 4 times per year     Marital Status:    Housing Stability: Low Risk  (7/7/2023)    Housing Stability Vital Sign     Unable to Pay for Housing in the Last Year: No     Number of Places Lived in the Last Year: 1     Unstable Housing in the Last Year: No     Family History    Problem Relation Name Age of Onset    Breast cancer Mother Jazmin Lasseigne 85    Hyperlipidemia Mother Jazmin Lasseigne     Hypertension Mother Jazmin Lasseigne     Diabetes Mother Jazmin Lasseigne     Cancer Mother Jazmin Lasseigne         Breast    Diverticulitis Mother Jazmin Lasseigne     Alzheimer's disease Mother Jazmin Lasseigne     Depression Mother Jazmin Lasseigne     Hearing loss Mother Jazmin Lasseigne     Heart disease Mother Jazmin Lasseigne     Hypertension Father Reymundo Lasseigne     Hyperlipidemia Father Reymundo Lasseigne     Stroke Father Reymundo Lasseigne     Hearing loss Father Reymundo Lasseigne     Heart disease Father Reymundo Lasseigne     Cancer Father Reymundo Lasseigne         Prostate    Diverticulitis Sister      Cancer Brother Reymundo Lasseigne         Nasal pharyngeal    Cancer Brother Marilyn Lasseigne         Bladder    Cancer Brother Jos Lasseigne and Marilyn Lasseigne         Nasopharyngeal - bladder    Breast cancer Maternal Aunt      Breast cancer Paternal Aunt       Review of Systems   Constitutional: Negative for chills, fatigue, fever and unexpected weight change.   HENT: Negative for ear pain and sore throat.    Eyes: Negative for redness and visual disturbance.   Respiratory: Negative for cough and shortness of breath.    Cardiovascular: Negative for chest pain and palpitations.   Gastrointestinal: Negative for nausea and vomiting.   Musculoskeletal: Negative for arthralgias and myalgias.   Skin: Negative for rash and wound.   Neurological: Negative for weakness and headaches.       Objective:    Nursing note and vitals reviewed.  Vitals:    04/25/24 0824   BP: 118/76   Pulse: 75   Resp: 18     Body mass index is 24.29 kg/m².     Physical Exam   Constitutional: She is oriented to person, place, and time. She appears well-developed and well-nourished. No distress.   HENT:  Narrow airway, right nasal passage nearly block with turbinate edema, congestion  Head: Normocephalic  and atraumatic.   Eyes: Pupils are equal, round, and reactive to light. EOM are normal.   Neck: Normal range of motion. Neck supple.   Cardiovascular: Normal rate, regular rhythm, normal heart sounds and intact distal pulses.   No murmur heard.  Pulmonary/Chest: Effort normal and breath sounds normal. No respiratory distress. She has no wheezes.   Musculoskeletal: Normal range of motion. She exhibits no edema.   Neurological: She is alert and oriented to person, place, and time. No cranial nerve deficit.   Skin: Skin is warm and dry. Capillary refill takes less than 2 seconds.   Psychiatric: She has a normal mood and affect. Her behavior is normal.     Assessment / Plan:      1.  ANNUAL WELLNESS EXAM -patient here for annual wellness exam.  Labs ordered.  Health maintenance was reviewed and ordered.  Medications were reviewed and reconciled.  Suspected sleep apnea set up home sleep study.  Follow-up after for results.  Patient also has sinus symptoms with allergies.  Continue Xyzal.  Add Flonase.    Hyperlipidemia:  Continue pravastatin. Update fasting lipid panel. Counseled on hyperlipidemia disease course, healthy diet and increased need for exercise.  Please be advised of the risk of cardiovascular disease, increase stroke and heart attack risk with uncontrolled/untreated hyperlipidemia.     Patient voiced understanding and understood the treatment plan. All questions were answered.     Hypothyroidism: Check TSH. Consider restarting armour thyroid if needed. Monitor for symptoms.Discussed hypothyroidism disease course.  Discussed risks and benefits of medication use.  ER precautions.    HTN: Continue current blood pressure medication regimen.Counseled on importance of hypertension disease course, I recommend ongoing Education for DASH-diet and exercise. Counseled on medication regimen importance of treating high blood pressure. Please be advised of risk of untreated blood pressure as discussed. Please advised of  ER precautions were given for symptoms of hypertensive urgency and emergency.     Continue current medications and plan of care per PCP and specialists. Follow up with specialists.     Complete history and physical was completed today.  Complete and thorough medication reconciliation was performed.  Discussed risks and benefits of medications.  Advised patient on orders and health maintenance.  We discussed old records and old labs if available.  Will request any records not available through epic.  Continue current medications listed on your summary sheet.    All questions were answered. Patient had no further concerns. Advised of Wellness plan. Follow up in 1 year for ANNUAL WELLNESS EXAM    Update routine labs. Update mammogram and DEXA w/ GYN as scheduled.   Orders Placed This Encounter   Procedures    CBC Without Differential     Standing Status:   Future     Number of Occurrences:   1     Standing Expiration Date:   7/24/2025    Comprehensive Metabolic Panel     Standing Status:   Future     Number of Occurrences:   1     Standing Expiration Date:   6/24/2025    TSH     Standing Status:   Future     Number of Occurrences:   1     Standing Expiration Date:   7/24/2025    Lipid Panel     Standing Status:   Future     Number of Occurrences:   1     Standing Expiration Date:   7/24/2025    Hemoglobin A1C     Standing Status:   Future     Number of Occurrences:   1     Standing Expiration Date:   6/24/2025    VITAMIN B12     Standing Status:   Future     Standing Expiration Date:   7/24/2025    Vitamin D     Standing Status:   Future     Standing Expiration Date:   6/24/2025     Medications Ordered This Encounter   Medications    pravastatin (PRAVACHOL) 40 MG tablet     Sig: Take 1 tablet (40 mg total) by mouth once daily.     Dispense:  90 tablet     Refill:  3     Rosa Teague NP

## 2024-04-25 NOTE — PATIENT INSTRUCTIONS
Vikash Kemp,     If you are due for any health screening(s) below please notify me so we can arrange them to be ordered and scheduled. Most healthy patients at your age complete them, but you are free to accept or refuse.     If you can't do it, I'll definitely understand. If you can, I'd certainly appreciate it!    All of your core healthy metrics are met.

## 2024-04-25 NOTE — PROGRESS NOTES
Assessment/Plan:    Problem List Items Addressed This Visit          Cardiac/Vascular    Pure hypercholesterolemia (Chronic)    Overview     Lab Results   Component Value Date    CHOL 291 (H) 12/15/2020    CHOL 261 (H) 08/21/2020    CHOL 336 (H) 05/29/2020     Lab Results   Component Value Date    HDL 89 (H) 12/15/2020    HDL 77 (H) 08/21/2020    HDL 94 (H) 05/29/2020     Lab Results   Component Value Date    LDLCALC 190.2 (H) 12/15/2020    LDLCALC 169.6 (H) 08/21/2020    LDLCALC 231.6 (H) 05/29/2020     Lab Results   Component Value Date    TRIG 59 12/15/2020    TRIG 72 08/21/2020    TRIG 52 05/29/2020     Lab Results   Component Value Date    CHOLHDL 30.6 12/15/2020    CHOLHDL 29.5 08/21/2020    CHOLHDL 28.0 05/29/2020     Initial HPI:  Chronic.  Untreated.  Only using diet and exercise.The most recent lipid panel was done by her OBGYN on May 31, 2019.  Showing total cholesterol is still elevated 286.  Triglycerides are normal at 82 HDL is therapeutic at 89 LDL is still high at 181.  Patient has strong family history of heart attacks in her family.  Thyroid studies were normal.  Testosterone is within normal limits.  Blood count and metabolic panel within normal limits.  ==========================  Patient does not want to take a statin at this time.  But is willing to take Zetia and fish oil and niacin.  Patient is concerned about her strong family history of heart attacks and strokes.  Patient denies any symptoms of chest pain shortness of breath blurry vision dizziness lightheadedness abdominal pain etc.  ======================================  JUNE 2020:  Lipid panel is significantly elevated from previous.  Patient has been on statins in the past and had severe muscle aches.  ==========================================  October 2020:  Patient did retry statin however she noticed that she was having headaches since she started this medication.  Patient tried to stop medication and the headaches went  away.  ==================================================  2020:  Lipid panel has worsened over previous three months.  Patient reports compliance with pravastatin 40 mg daily.  She was recently changed to this medication.  No side effects reported.  Patient did stop multiple supplements and estrogen patch since last visit.  Patient no longer taking fish oil.  =================================================         Relevant Medications    pravastatin (PRAVACHOL) 40 MG tablet     Other Visit Diagnoses       Encounter for wellness examination    -  Primary    Relevant Orders    CBC Without Differential    Comprehensive Metabolic Panel    TSH    Lipid Panel    Hemoglobin A1C            No follow-ups on file.    Rosa Teague NP  _____________________________________________________________________________________________________________________________________________________    CC: ***    HPI: Patient is in clinic today as an established patient here for ***      [facility]  Small bowel bacteria overgrowth - omeprazole   Xiafan     No dairy, no gluten, no sugar, no alcohol     Mammogram and bone scan   Preethi yumiko- GYN          [place] thyroid  Lab Results   Component Value Date    TSH 1.414 2023       Past Medical History:  Past Medical History:   Diagnosis Date    Acne     Acquired hypothyroidism 2020    Chronic.  Stable. Lab Results Component Value Date  TSH 1.993 2020  FREET4 1.08 2019  Patient reports compliance with [place] Thyroid 30 mg.    Diverticulosis     Encounter for blood transfusion     Hyperlipidemia     Hypertension     IBS (irritable bowel syndrome)      Past Surgical History:   Procedure Laterality Date     SECTION      X3    CYSTOSCOPY W/ RETROGRADES N/A 10/02/2019    Procedure: CYSTOSCOPY, WITH RETROGRADE PYELOGRAM;  Surgeon: Fred Aranda MD;  Location: [facility];  Service: Urology;  Laterality: N/A;    EYE SURGERY   1995    HYSTERECTOMY  2008    TOTAL ABDOMINAL HYSTERECTOMY W/ BILATERAL SALPINGOOPHORECTOMY  2008    MENORRHAGIA    TUBAL LIGATION  05/1994     Review of patient's allergies indicates:   Allergen Reactions    Zetia [ezetimibe] Diarrhea    Atorvastatin      Social History     Tobacco Use    Smoking status: Never    Smokeless tobacco: Never   Substance Use Topics    Alcohol use: Yes     Alcohol/week: 4.0 standard drinks of alcohol     Types: 4 Glasses of wine per week     Comment: daily    Drug use: Never     Family History   Problem Relation Name Age of Onset    Breast cancer Mother Jazmin Lasseigne 85    Hyperlipidemia Mother Jazmin Lasseigne     Hypertension Mother Jazmin Lasseigne     Diabetes Mother Jazmin Lasseigne     Cancer Mother Jazmin Lasseigne         Breast    Diverticulitis Mother Jazmin Lasseigne     Alzheimer's disease Mother Jazmin Lasseigne     Depression Mother Jazmin Lasseigne     Hearing loss Mother Jazmin Lasseigne     Heart disease Mother Jazmin Lasseigne     Hypertension Father Reymundo Lasseigne     Hyperlipidemia Father Reymundo Lasseigne     Stroke Father Reymundo Lasseigne     Hearing loss Father Reymundo Lasseigne     Heart disease Father Reymundo Lasseigne     Cancer Father Reymundo Lasseigne         Prostate    Diverticulitis Sister      Cancer Brother Reymundo Lasseigne         Nasal pharyngeal    Cancer Brother Marilyn Lasseigne         Bladder    Cancer Brother Jos Lasseigne and Marilyn Lasseigne         Nasopharyngeal - bladder    Breast cancer Maternal Aunt      Breast cancer Paternal Aunt       Current Outpatient Medications on File Prior to Visit   Medication Sig Dispense Refill    dicyclomine (BENTYL) 10 MG capsule daily as needed.       estrogens,esterified,-methyltestosterone 0.625-1.25mg (ESTRATEST HS) per tablet Take 1 tablet by mouth once daily. 30 tablet 5    levocetirizine (XYZAL) 5 MG tablet Take 5 mg by mouth every evening.      multivit,iron,minerals/lutein (CENTRUM SILVER  "ULTRA WOMEN'S ORAL)       omega 3-dha-epa-fish oil (FISH OIL) 1,000 mg (120 mg-180 mg) Cap Take as directed.      ONEXTON 1.2 %(1 % base) -3.75 % GlwP Use as directed. 1 Bottle 1    Saccharomyces boulardii (FLORASTOR) 250 mg capsule Take 250 mg by mouth once daily.      [DISCONTINUED] pravastatin (PRAVACHOL) 40 MG tablet Take 1 tablet (40 mg total) by mouth once daily. 90 tablet 2    aspirin (ECOTRIN) 81 MG EC tablet Take 1 tablet (81 mg total) by mouth once daily. 90 tablet 3    co-enzyme Q-10 30 mg capsule Take 30 mg by mouth once daily.      cyanocobalamin (VITAMIN B-12) 1000 MCG tablet Take 100 mcg by mouth once daily.      vitamin K2 45 mcg Cap Take by mouth.      [DISCONTINUED] clotrimazole-betamethasone 1-0.05% (LOTRISONE) cream Apply topically 2 (two) times daily. 1 Tube 1    [DISCONTINUED] omeprazole (PRILOSEC) 40 MG capsule TAKE 1 CAPSULE BY MOUTH EVERY DAY 90 capsule 1    [DISCONTINUED] thyroid, pork, (ARMOUR THYROID) 30 mg Tab TAKE 1 TABLET BY MOUTH EVERY DAY BEFORE BREAKFAST 90 tablet 1     No current facility-administered medications on file prior to visit.     Review of Systems    Vitals:    04/25/24 0824   BP: 118/76   BP Location: Right arm   Patient Position: Sitting   Pulse: 75   Resp: 18   Weight: 68.3 kg (150 lb 8 oz)   Height: 5' 6" (1.676 m)     Wt Readings from Last 3 Encounters:   04/25/24 68.3 kg (150 lb 8 oz)   09/29/23 70.3 kg (154 lb 15.7 oz)   06/29/23 69.3 kg (152 lb 10.7 oz)     Physical Exam    Health Maintenance   Topic Date Due    Mammogram  05/26/2024    Shingles Vaccine (1 of 2) 05/18/2024 (Originally 2/28/2014)    High Dose Statin  10/02/2024    Lipid Panel  05/18/2028    TETANUS VACCINE  09/03/2032    Colorectal Cancer Screening  02/02/2034    Hepatitis C Screening  Completed       "

## 2024-04-26 ENCOUNTER — PATIENT MESSAGE (OUTPATIENT)
Dept: FAMILY MEDICINE | Facility: CLINIC | Age: 59
End: 2024-04-26
Payer: COMMERCIAL

## 2024-04-26 DIAGNOSIS — E78.00 PURE HYPERCHOLESTEROLEMIA: Primary | ICD-10-CM

## 2024-04-26 RX ORDER — ROSUVASTATIN CALCIUM 40 MG/1
40 TABLET, COATED ORAL NIGHTLY
Qty: 90 TABLET | Refills: 3 | Status: SHIPPED | OUTPATIENT
Start: 2024-04-26 | End: 2025-04-26

## 2024-04-26 NOTE — TELEPHONE ENCOUNTER
I have signed for the following orders AND/OR meds.  Please call the patient and ask the patient to schedule the testing AND/OR inform about any medications that were sent. Medications have been sent to pharmacy listed below      Medications Ordered This Encounter   Medications    rosuvastatin (CRESTOR) 40 MG Tab     Sig: Take 1 tablet (40 mg total) by mouth every evening.     Dispense:  90 tablet     Refill:  3       [facility] [address] [address]  Phone: [phone] Fax: [phone]

## 2024-05-18 ENCOUNTER — PATIENT MESSAGE (OUTPATIENT)
Dept: FAMILY MEDICINE | Facility: CLINIC | Age: 59
End: 2024-05-18
Payer: COMMERCIAL

## 2024-06-10 ENCOUNTER — TELEPHONE (OUTPATIENT)
Dept: PULMONOLOGY | Facility: CLINIC | Age: 59
End: 2024-06-10
Payer: COMMERCIAL

## 2024-06-10 ENCOUNTER — PATIENT MESSAGE (OUTPATIENT)
Dept: PULMONOLOGY | Facility: CLINIC | Age: 59
End: 2024-06-10
Payer: COMMERCIAL

## 2024-09-11 ENCOUNTER — PATIENT MESSAGE (OUTPATIENT)
Dept: FAMILY MEDICINE | Facility: CLINIC | Age: 59
End: 2024-09-11
Payer: COMMERCIAL

## 2024-09-16 ENCOUNTER — TELEPHONE (OUTPATIENT)
Dept: PULMONOLOGY | Facility: CLINIC | Age: 59
End: 2024-09-16
Payer: COMMERCIAL

## 2024-10-28 NOTE — TELEPHONE ENCOUNTER
Care Due:                  Date            Visit Type   Department     Provider  --------------------------------------------------------------------------------                                EP -                              PRIMARY      [facility] FAMILY  Last Visit: 05-      CARE ([facility])   MEDICINE       Ranjeet Coleman  Next Visit: None Scheduled  None         None Found                                                            Last  Test          Frequency    Reason                     Performed    Due Date  --------------------------------------------------------------------------------    Office Visit  12 months..  omeprazole, pravastatin,   05- 05-                             thyroid,.................    CMP.........  12 months..  pravastatin..............  05- 05-    Lipid Panel.  12 months..  pravastatin..............  03-   03-    TSH.........  12 months..  thyroid,.................  03-   03-    Powered by [employer] by [employer]. Reference number: [id number].   3/18/2022 4:01:16 PM CDT   Unknown

## 2024-11-22 ENCOUNTER — OFFICE VISIT (OUTPATIENT)
Dept: PULMONOLOGY | Facility: CLINIC | Age: 59
End: 2024-11-22
Payer: COMMERCIAL

## 2024-11-22 VITALS
SYSTOLIC BLOOD PRESSURE: 120 MMHG | HEIGHT: 66 IN | BODY MASS INDEX: 25.07 KG/M2 | HEART RATE: 90 BPM | DIASTOLIC BLOOD PRESSURE: 80 MMHG | WEIGHT: 156 LBS | OXYGEN SATURATION: 96 %

## 2024-11-22 DIAGNOSIS — G47.33 OSA (OBSTRUCTIVE SLEEP APNEA): Primary | ICD-10-CM

## 2024-11-22 DIAGNOSIS — Z78.9 DIFFICULTY WITH CPAP USE: ICD-10-CM

## 2024-11-22 DIAGNOSIS — R09.81 NASAL CONGESTION: Chronic | ICD-10-CM

## 2024-11-22 DIAGNOSIS — Z71.89 CPAP USE COUNSELING: ICD-10-CM

## 2024-11-22 PROCEDURE — 99999 PR PBB SHADOW E&M-EST. PATIENT-LVL V: CPT | Mod: PBBFAC,,, | Performed by: INTERNAL MEDICINE

## 2024-11-22 RX ORDER — FLUTICASONE PROPIONATE 50 MCG
1 SPRAY, SUSPENSION (ML) NASAL DAILY
Qty: 16 G | Refills: 0 | Status: SHIPPED | OUTPATIENT
Start: 2024-11-22

## 2024-11-22 NOTE — PATIENT INSTRUCTIONS
No eating / drinking for 3 hours before going to bed.  Elevate head of bed 30 - 45 %     CPAP HABITUATION PROCEDURE     Justin Hendricks, Ph.D., [facility] and Ludin Garcia M.D.  [facility], [facility]     Some people have difficulty adjusting to CPAP/BiPAP/AutoCPAP.  This is not unusual or hard to understand: Breathing with CPAP is different from ordinary breathing, and this difference is aversive to some. The problem can be overcome, however, and the benefits CPAP confers are certainly worth the effort.  Below, you will find a simple and gradual way to get used to CPAP before you try to use it all night, every night.  The essence of this procedure is to relax and let breathing with CPAP become a habit.  It may take about 2 weeks, and involves the following:      CPAP while awake and comfortably seated, during the late evening.     CPAP in bed while attempting sleep at night.     If your discomfort is too great at any time, discontinue and attempt again later the same night, for the same amount of time.   You and your physician may alter the times and pressures if necessary.     If you find that it is very easy to get used to CPAP, you may start using it every night when you are comfortable enough to do so.  IMPORTANT REMINDER: If you have a cold or sinus congestion it is okay to miss a night or two of CPAP. Consider using antihistamines or decongestants to clear up your sinus congestion prior to sleeping.     DAYS  1-3   Start CPAP while awake and comfortably seated during the late evening, after having prepared for bed.  You may do this while watching television, listening to music or reading. Use for 1 hour, then take off CPAP and go directly to bed to sleep     DAYS  4-6     Start CPAP when you go to bed and use for 1 hour, or until you fall asleep.  If your discomfort is too great at any time, discontinue and attempt again later the same night, for the same designated  amount of time (1 hour).      DAYS  7-9     Increase time with CPAP to 2 hours a night.  If your discomfort is too great at any time, discontinue and attempt again later the same night, for the same designated amount of time (2 hours).      DAYS 10-12    Increase time with CPAP to 3 hours a night. If your discomfort is too great at any time, discontinue and attempt again later the same night, for the same designated amount of time (3 hours).      DAYS 13-15     Sleep the entire night with CPAP.      OPTIONAL: You may use Progressive Muscle Relaxation (PMR) to help put you at ease when using CPAP; do PMR twice each day, once in the morning or afternoon, and once in the evening just before using CPAP. You may do PMR prior to any attempt until you are comfortable with CPAP.        Continuous Positive Air Pressure (CPAP)  Continuous positive air pressure (CPAP) uses gentle air pressure to hold the airway open. CPAP is often the most effective treatment for sleep apnea and severe snoring. It works very well for many people. But keep in mind that it can take several adjustments before the setup is right for you.       How CPAP Works  CPAP is a small portable pump beside the bed. The pump sends air through a hose, which is held over your nose and/or mouth by a mask. Mild air pressure is gently pushed through your airway. The air pressure nudges sagging tissues aside. This widens the airway so you can breathe better. CPAP may be combined with other kinds of therapy for sleep apnea.       Types of Air Pressure Treatments  There are different types of CPAP. Your doctor or CPAP technician will help you decide which type is best for you:  Basic CPAP keeps the pressure constant all night long.  A bilevel device (BiPAP) provides more pressure when you breathe in and less when you breathe out. A BiPAP machine also may be set to provide automatic breaths to maintain breathing if you stop breathing while sleeping.  An autoCPAP  device automatically adjusts pressure throughout the night and in response to changes such as body position, sleep stage, and snoring.  © 2992-8996 [employer]. [address] [place] [address]. All rights reserved. This information is not intended as a substitute for professional medical care. Always follow your healthcare professional's instructions.        Snoring and Sleep Apnea: Notes for a Partner  Snoring and sleep apnea affect your life, as well as your partners. You can help in the treatment of the problem. Be supportive. Encourage your partner both to get treatment and to make the adjustments needed to follow through.       Adjusting to Changes  Your partners treatment may involve making changes to certain life habits. You can help your partner make and stick with these changes. For example:  Support and even join your partners exercise program.  Be supportive if your partner gets CPAP (continuous positive airway pressure). He or she may feel self-conscious at first. Remind your partner to expect adjustments to CPAP before it feels just right.  Consider joining a snoring and sleep apnea support group.  Go Along to See the Health Care Provider  You can give the health care provider the best account of your partners nighttime breathing and snoring patterns. Try to go along to health care providers appointments. If you cant go, write notes for your partner to give to the health care provider. Describe your partners snoring and sleep breathing patterns in detail.  Tips for Sleeping with a Snorer  Until treatment takes care of your partners snoring:  Try to go to bed first. It may help if youre already asleep when your partner starts to snore.  Wear earplugs to bed. A fan or other source of background noise may also help drown out snoring.   © 2006-7308 [employer]. [address] [place] [address]. All rights reserved. This information is not intended as a  substitute for professional medical care. Always follow your healthcare professional's instructions.        Continuous Positive Airway Pressure (CPAP)  Your health care provider has prescribed continuous positive airway pressure (CPAP) therapy for you. A CPAP device helps you breathe better at night. The device delivers air through your nose or mouth when you breathe in to keep your air passages open. CPAP is:  Used most often to treat sleep apnea and some other problems (Sleep apnea is a chronic condition with periods of sleep in which you briefly stop breathing.)  Safe and very effective, but it takes time to get used to the mask.   Your health care provider, nurse, or medical supplier will give you tips for wearing and caring for your CPAP device.  General guidelines  It's very important not to give up! It takes time to get used to wearing the mask at night.  Practice using your CPAP device during the day, especially whenever you take a nap.  Remember, there are several different types of masks. If you cant get used to your mask, ask your provider or medical supply company about trying another style.  If you have nasal stuffiness or dryness when using your CPAP device, talk with your provider or medical supply company. There are ways to lessen these problems. For example, your provider may recommend moistening nasal spray or the medical supply company may recommend a device with a humidifier.  The goal is to use your CPAP all night, every night, during all naps, and even when you travel.  Keep your mask clean. Wash it with soap and water. Be sure to rinse the mask and tubing well with water to remove any soap. Let them air-dry thoroughly before using.  Make yourself comfortable when sleeping with CPAP. Try using extra pillows.  Work with your medical supply company so that you know how to correctly use your CPAP. Their representative will be able to help you:  Use the CPAP correctly  Troubleshoot any problems  that come up  Learn to clean and maintain the device  Adjust to regular use of the CPAP  © 3952-7884 [employer], [employer]. [address] [place] [address], [place] [address]. All rights reserved. This information is not intended as a substitute for professional medical care. Always follow your healthcare professional's instructions.         Referral for dental device for sleep apnea.       Dr Ramos         [address] [facility], [place], [place], [place], [place]     [phone]        Dr. Kurt A. LeJeune    [address] [place] [address]    Phone:[phone]    Dr Live     [facility]    [address] [place] [address]  [address] [place] [address] [phone]    Dr. Shay Pedraza     [address], [facility]# [address] [place] [phone]

## 2024-11-22 NOTE — PROGRESS NOTES
Pulmonary Outpatient Follow Up Visit     Subjective:    This patient is new to me.  Previous records with colleagues including office visits, testing were personally reviewed.  Outside records under care everywhere if available that includes office visits and testing were reviewed personally as well.     Patient ID: Viridiana Ross is a 59 y.o. female.    Chief Complaint: Sleep Apnea and Pulmonary Nodules      HPI         59-year-old female patient presenting for follow-up.      Severe obstructive sleep apnea on auto PAP.  Partially adherent.      [place] Sleepiness scale score 14.                Review of Systems   Respiratory:  Positive for apnea, snoring and somnolence.    Psychiatric/Behavioral:  Positive for sleep disturbance.        Outpatient Encounter Medications as of 11/22/2024   Medication Sig Dispense Refill    aspirin (ECOTRIN) 81 MG EC tablet Take 1 tablet (81 mg total) by mouth once daily. 90 tablet 3    cholecalciferol, vitamin D3, (VITAMIN D3) 50 mcg (2,000 unit) Cap capsule Take 2,000 Units by mouth.      dicyclomine (BENTYL) 10 MG capsule daily as needed.       estrogens,esterified,-methyltestosterone 0.625-1.25mg (ESTRATEST HS) per tablet Take 1 tablet by mouth once daily. 30 tablet 5    famotidine (PEPCID) 40 MG tablet Take 40 mg by mouth 2 (two) times daily.      fluticasone propionate (FLONASE) 50 mcg/actuation nasal spray 1 spray (50 mcg total) by Each Nostril route once daily. 16 g 0    folic acid (FOLVITE) 1 MG tablet 0      levocetirizine (XYZAL) 5 MG tablet Take 5 mg by mouth every evening.      multivit,iron,minerals/lutein (CENTRUM SILVER ULTRA WOMEN'S ORAL)       omega 3-dha-epa-fish oil (FISH OIL) 1,000 mg (120 mg-180 mg) Cap Take as directed.      ONEXTON 1.2 %(1 % base) -3.75 % GlwP Use as directed. 1 Bottle 1    rosuvastatin (CRESTOR) 40 MG Tab Take 1 tablet (40 mg total) by mouth every evening. 90 tablet 3    turmeric root extract 1,053 mg  "Tab       vitamin K2, MK-4, 100 mcg Tab       [DISCONTINUED] calcium carbonate-vitamin D3 (CALTRATE 600 PLUS D) 600 mg-20 mcg (800 unit) Chew Take 1 tablet by mouth. (Patient not taking: Reported on 6/27/2024)      [DISCONTINUED] Saccharomyces boulardii (FLORASTOR) 250 mg capsule Take 250 mg by mouth once daily.      [DISCONTINUED] testosterone 1 % (25 mg/2.5gram) GlPk APPLY 0.1 ml TO inner wrist EVERY DAY      [DISCONTINUED] XIFAXAN 550 mg Tab Take 550 mg by mouth 3 (three) times daily.       No facility-administered encounter medications on file as of 11/22/2024.       Objective:     Vital Signs (Most Recent)  Vital Signs  Pulse: 90  SpO2: 96 %  BP: 120/80  Pain Score: 0-No pain  Height and Weight  Height: 5' 6" (167.6 cm)  Weight: 70.8 kg (155 lb 15.6 oz)  BSA (Calculated - sq m): 1.81 sq meters  BMI (Calculated): 25.2  Weight in (lb) to have BMI = 25: 154.6]  Wt Readings from Last 2 Encounters:   11/22/24 70.8 kg (155 lb 15.6 oz)   06/27/24 68.5 kg (151 lb)       Physical Exam   Constitutional: She is oriented to person, place, and time. She appears well-developed and well-nourished.   HENT:   Overjet noted   Pulmonary/Chest: No respiratory distress.   Neurological: She is alert and oriented to person, place, and time.   Psychiatric: She has a normal mood and affect. Her behavior is normal.       Laboratory  Lab Results   Component Value Date    WBC 6.79 04/25/2024    RBC 4.71 04/25/2024    HGB 14.4 04/25/2024    HCT 45.3 04/25/2024    MCV 96 04/25/2024    MCH 30.6 04/25/2024    MCHC 31.8 (L) 04/25/2024    RDW 12.6 04/25/2024     04/25/2024    MPV 10.9 04/25/2024    GRAN 4.6 05/13/2021    GRAN 64.5 05/13/2021    LYMPH 1.8 05/13/2021    LYMPH 24.8 05/13/2021    MONO 0.6 05/13/2021    MONO 9.1 05/13/2021    EOS 0.0 05/13/2021    BASO 0.04 05/13/2021    EOSINOPHIL 0.4 05/13/2021    BASOPHIL 0.6 05/13/2021       BMP  Lab Results   Component Value Date     04/25/2024    K 3.9 04/25/2024     " "04/25/2024    CO2 26 04/25/2024    BUN 17 04/25/2024    CREATININE 1.1 04/25/2024    CALCIUM 10.7 (H) 04/25/2024    ANIONGAP 7 (L) 04/25/2024    ESTGFRAFRICA >60.0 03/18/2022    EGFRNONAA >60.0 03/18/2022    AST 21 04/25/2024    ALT 20 04/25/2024    PROT 7.4 04/25/2024       No results found for: "BNP"    Lab Results   Component Value Date    TSH 2.653 04/25/2024       Lab Results   Component Value Date    SEDRATE 4 05/13/2021       Lab Results   Component Value Date    CRP 1.8 05/13/2021     No results found for: "IGE"     No results found for: "ASPERGILLUS"  No results found for: "AFUMIGATUSCL"     No results found for: "ACE"     Diagnostic Results:  I have personally reviewed today the following studies:              Assessment/Plan:   ADRIANO (obstructive sleep apnea)  -     Ambulatory referral/consult to Dentistry; Future; Expected date: 11/29/2024    CPAP use counseling  -     Ambulatory referral/consult to Dentistry; Future; Expected date: 11/29/2024    Difficulty with CPAP use  -     Ambulatory referral/consult to Dentistry; Future; Expected date: 11/29/2024    Nasal congestion  -     fluticasone propionate (FLONASE) 50 mcg/actuation nasal spray; 1 spray (50 mcg total) by Each Nostril route once daily.  Dispense: 16 g; Refill: 0      Advised patient to continue CPAP therapy and improve hours of usage.      Discussed with patient oral appliance therapy and hypoglossal nerve stimulator.      Can be a suitable candidate for oral appliance therapy and/or hypoglossal nerve stimulator.      She opted to proceed 1st with a dental evaluation for oral appliance therapy.      Referral and sleep study copy provided.  Follow up in about 3 months (around 2/22/2025).    This note was prepared using voice recognition system and is likely to have sound alike errors that may have been overlooked even after proof reading.  Please call me with any questions    Discussed diagnosis, its evaluation, treatment and usual course. All " questions answered.      Nathan Calles MD

## 2025-01-16 DIAGNOSIS — E78.00 PURE HYPERCHOLESTEROLEMIA: ICD-10-CM

## 2025-01-16 NOTE — TELEPHONE ENCOUNTER
Refill Routing Note   Medication(s) are not appropriate for processing by [facility] for the following reason(s):        Non-participating provider    [facility] action(s):  Route               Appointments  past 12m or future 3m with PCP    Date Provider   Last Visit   4/25/2024 Rosa Teague NP   Next Visit   Visit date not found Rosa Teague NP   ED visits in past 90 days: 0        Note composed:4:48 PM 01/16/2025

## 2025-01-17 RX ORDER — ROSUVASTATIN CALCIUM 40 MG/1
40 TABLET, COATED ORAL NIGHTLY
Qty: 90 TABLET | Refills: 3 | Status: SHIPPED | OUTPATIENT
Start: 2025-01-17 | End: 2026-01-17

## 2025-02-17 ENCOUNTER — PATIENT MESSAGE (OUTPATIENT)
Dept: PULMONOLOGY | Facility: CLINIC | Age: 60
End: 2025-02-17
Payer: COMMERCIAL

## 2025-04-07 ENCOUNTER — PATIENT MESSAGE (OUTPATIENT)
Dept: FAMILY MEDICINE | Facility: CLINIC | Age: 60
End: 2025-04-07
Payer: COMMERCIAL

## 2025-04-07 DIAGNOSIS — E78.00 PURE HYPERCHOLESTEROLEMIA: ICD-10-CM

## 2025-04-07 RX ORDER — ROSUVASTATIN CALCIUM 40 MG/1
TABLET, COATED ORAL
Qty: 90 TABLET | Refills: 1 | Status: SHIPPED | OUTPATIENT
Start: 2025-04-07

## 2025-04-07 NOTE — TELEPHONE ENCOUNTER
Refill Routing Note   Medication(s) are not appropriate for processing by [facility] for the following reason(s):        Non-participating provider  Responsible provider unclear    [facility] action(s):  Route             Appointments  past 12m or future 3m with PCP    Date Provider   Last Visit   4/25/2024 Rosa Teague, NP   Next Visit   4/28/2025 Rosa Teague, NP   ED visits in past 90 days: 0        Note composed:9:25 AM 04/07/2025

## 2025-04-28 ENCOUNTER — LAB VISIT (OUTPATIENT)
Dept: LAB | Facility: HOSPITAL | Age: 60
End: 2025-04-28
Attending: NURSE PRACTITIONER
Payer: COMMERCIAL

## 2025-04-28 ENCOUNTER — OFFICE VISIT (OUTPATIENT)
Dept: FAMILY MEDICINE | Facility: CLINIC | Age: 60
End: 2025-04-28
Payer: COMMERCIAL

## 2025-04-28 VITALS
HEIGHT: 66 IN | BODY MASS INDEX: 24.59 KG/M2 | HEART RATE: 49 BPM | DIASTOLIC BLOOD PRESSURE: 82 MMHG | WEIGHT: 153 LBS | RESPIRATION RATE: 14 BRPM | SYSTOLIC BLOOD PRESSURE: 128 MMHG

## 2025-04-28 DIAGNOSIS — I10 ESSENTIAL HYPERTENSION: Chronic | ICD-10-CM

## 2025-04-28 DIAGNOSIS — E78.00 PURE HYPERCHOLESTEROLEMIA: Chronic | ICD-10-CM

## 2025-04-28 DIAGNOSIS — Z79.899 ENCOUNTER FOR LONG-TERM (CURRENT) USE OF MEDICATIONS: Chronic | ICD-10-CM

## 2025-04-28 DIAGNOSIS — Z82.3 FAMILY HISTORY OF STROKE: ICD-10-CM

## 2025-04-28 DIAGNOSIS — Z00.00 ENCOUNTER FOR WELLNESS EXAMINATION: Primary | ICD-10-CM

## 2025-04-28 DIAGNOSIS — Z79.890 HORMONE REPLACEMENT THERAPY (POSTMENOPAUSAL): ICD-10-CM

## 2025-04-28 DIAGNOSIS — K58.0 IRRITABLE BOWEL SYNDROME WITH DIARRHEA: ICD-10-CM

## 2025-04-28 DIAGNOSIS — E03.9 ACQUIRED HYPOTHYROIDISM: Chronic | ICD-10-CM

## 2025-04-28 DIAGNOSIS — Z00.00 ENCOUNTER FOR WELLNESS EXAMINATION: ICD-10-CM

## 2025-04-28 DIAGNOSIS — G47.33 OSA (OBSTRUCTIVE SLEEP APNEA): ICD-10-CM

## 2025-04-28 DIAGNOSIS — E55.9 VITAMIN D DEFICIENCY: Chronic | ICD-10-CM

## 2025-04-28 PROBLEM — R31.21 ASYMPTOMATIC MICROSCOPIC HEMATURIA: Status: RESOLVED | Noted: 2019-09-10 | Resolved: 2025-04-28

## 2025-04-28 PROBLEM — L65.9 HAIR LOSS: Status: RESOLVED | Noted: 2019-11-25 | Resolved: 2025-04-28

## 2025-04-28 PROBLEM — R51.9 NONINTRACTABLE HEADACHE: Status: RESOLVED | Noted: 2020-10-09 | Resolved: 2025-04-28

## 2025-04-28 PROBLEM — G47.00 INSOMNIA: Status: RESOLVED | Noted: 2019-09-10 | Resolved: 2025-04-28

## 2025-04-28 PROBLEM — R59.0 LAD (LYMPHADENOPATHY), CERVICAL: Status: RESOLVED | Noted: 2021-05-13 | Resolved: 2025-04-28

## 2025-04-28 LAB
ALBUMIN SERPL BCP-MCNC: 3.7 G/DL (ref 3.5–5.2)
ALP SERPL-CCNC: 84 UNIT/L (ref 40–150)
ALT SERPL W/O P-5'-P-CCNC: 33 UNIT/L (ref 10–44)
ANION GAP (OHS): 7 MMOL/L (ref 8–16)
AST SERPL-CCNC: 28 UNIT/L (ref 11–45)
BILIRUB SERPL-MCNC: 0.4 MG/DL (ref 0.1–1)
BUN SERPL-MCNC: 15 MG/DL (ref 8–23)
CALCIUM SERPL-MCNC: 9.2 MG/DL (ref 8.7–10.5)
CHLORIDE SERPL-SCNC: 108 MMOL/L (ref 95–110)
CHOLEST SERPL-MCNC: 169 MG/DL (ref 120–199)
CHOLEST/HDLC SERPL: 3.4 {RATIO} (ref 2–5)
CO2 SERPL-SCNC: 25 MMOL/L (ref 23–29)
CREAT SERPL-MCNC: 0.9 MG/DL (ref 0.5–1.4)
EAG (OHS): 108 MG/DL (ref 68–131)
ERYTHROCYTE [DISTWIDTH] IN BLOOD BY AUTOMATED COUNT: 13.2 % (ref 11.5–14.5)
GFR SERPLBLD CREATININE-BSD FMLA CKD-EPI: >60 ML/MIN/1.73/M2
GLUCOSE SERPL-MCNC: 86 MG/DL (ref 70–110)
HBA1C MFR BLD: 5.4 % (ref 4–5.6)
HCT VFR BLD AUTO: 44.8 % (ref 37–48.5)
HDLC SERPL-MCNC: 50 MG/DL (ref 40–75)
HDLC SERPL: 29.6 % (ref 20–50)
HGB BLD-MCNC: 14.4 GM/DL (ref 12–16)
LDLC SERPL CALC-MCNC: 101.4 MG/DL (ref 63–159)
MCH RBC QN AUTO: 30.3 PG (ref 27–31)
MCHC RBC AUTO-ENTMCNC: 32.1 G/DL (ref 32–36)
MCV RBC AUTO: 94 FL (ref 82–98)
NONHDLC SERPL-MCNC: 119 MG/DL
PLATELET # BLD AUTO: 278 K/UL (ref 150–450)
PMV BLD AUTO: 10.8 FL (ref 9.2–12.9)
POTASSIUM SERPL-SCNC: 4.1 MMOL/L (ref 3.5–5.1)
PROT SERPL-MCNC: 6.9 GM/DL (ref 6–8.4)
RBC # BLD AUTO: 4.75 M/UL (ref 4–5.4)
SODIUM SERPL-SCNC: 140 MMOL/L (ref 136–145)
TRIGL SERPL-MCNC: 88 MG/DL (ref 30–150)
TSH SERPL-ACNC: 2.62 UIU/ML (ref 0.4–4)
WBC # BLD AUTO: 7.21 K/UL (ref 3.9–12.7)

## 2025-04-28 PROCEDURE — 83036 HEMOGLOBIN GLYCOSYLATED A1C: CPT

## 2025-04-28 PROCEDURE — 85027 COMPLETE CBC AUTOMATED: CPT

## 2025-04-28 PROCEDURE — 80053 COMPREHEN METABOLIC PANEL: CPT

## 2025-04-28 PROCEDURE — 36415 COLL VENOUS BLD VENIPUNCTURE: CPT | Mod: PO

## 2025-04-28 PROCEDURE — 99999 PR PBB SHADOW E&M-EST. PATIENT-LVL V: CPT | Mod: PBBFAC,,, | Performed by: NURSE PRACTITIONER

## 2025-04-28 PROCEDURE — 80061 LIPID PANEL: CPT

## 2025-04-28 PROCEDURE — 84443 ASSAY THYROID STIM HORMONE: CPT

## 2025-04-28 NOTE — PROGRESS NOTES
This note is specifically for wellness visit performed today.   WELLNESS EXAM      Patient ID: Viridiana Ross is a 60 y.o. female with  has a past medical history of Acne, Acquired hypothyroidism (6/18/2020), Diverticulosis, Encounter for blood transfusion, Hyperlipidemia, Hypertension, and IBS (irritable bowel syndrome).     Chief Complaint:  Encounter for wellness exam    Well Adult Physical: Patient is a 60-year-old female who presents here for a comprehensive annual physical exam.    MEDICAL HISTORY:  She has a history of high cholesterol and sleep apnea. She uses a CPAP machine, which initially caused difficulty but has been working well since adjusting the humidification. She is followed by pulmonology. History of hypothyroidism. She was on armour thyroid in the past. She is not currently taking thyroid medication.    Lab Results   Component Value Date    TSH 2.653 04/25/2024     RECENT TEST RESULTS:  Mammogram and bone scan in June were normal. Colonoscopy in February 2025 was normal with recommendation to return in 10 years.    GASTROINTESTINAL:  She reports experienced SIBO in March of last year with significant improvement after Rifaximin treatment. History of IBS w/ diarrhea; takes Bentyl PRN. Followed by GI.     DIET AND EXERCISE:  She previously followed a keto diet which has been discontinued. She reports increased physical activity since [facility], now walking four miles 5 times per week.    FAMILY HISTORY:  Her father had a stroke at age 86. She is expecting a grandchild diagnosed with Trisomy 22, due in June. 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     CURRENT MEDICATIONS:  She takes aspirin, vitamin D, Bentyl (as needed, rarely used), estrogen, folic acid, multivitamin, rosuvastatin, turmeric, and vitamin K2.    Hypertension: she was on medication in the past. Controlled with lifestyle modifications.   CHRONIC. STABLE. BP Reviewed.  Compliant with BP medications. No  SE reported.   (-) CP, SOB, palpitations, dizziness, lightheadedness, HA, arm numbness, tingling or weakness, syncope.    Creatinine   Date Value Ref Range Status   04/25/2024 1.1 0.5 - 1.4 mg/dL Final     Hyperlipidemia: need better control. She has since made lifestyle modifications.   CHRONIC. STABLE. Lab analysis reviewed.   (-) CP, SOB, abdominal pain, N/V/D, constipation, jaundice, skin changes.  (-) Myalgias  Lab Results   Component Value Date    CHOL 239 (H) 04/25/2024    CHOL 232 (H) 05/18/2023    CHOL 220 (H) 03/18/2022     Lab Results   Component Value Date    HDL 53 04/25/2024    HDL 62 05/18/2023    HDL 73 03/18/2022     Lab Results   Component Value Date    LDLCALC 170.2 (H) 04/25/2024    LDLCALC 154.6 05/18/2023    LDLCALC 137.2 03/18/2022     Lab Results   Component Value Date    TRIG 79 04/25/2024    TRIG 77 05/18/2023    TRIG 49 03/18/2022     Lab Results   Component Value Date    CHOLHDL 22.2 04/25/2024    CHOLHDL 26.7 05/18/2023    CHOLHDL 33.2 03/18/2022     Lab Results   Component Value Date    TOTALCHOLEST 4.5 04/25/2024    TOTALCHOLEST 3.7 05/18/2023    TOTALCHOLEST 3.0 03/18/2022     Lab Results   Component Value Date    ALT 20 04/25/2024    AST 21 04/25/2024    ALKPHOS 86 04/25/2024    BILITOT 0.7 04/25/2024     ======================================================  The 10-year ASCVD risk score (Jami BAIN, et al., 2019) is: 4.3%    Values used to calculate the score:      Age: 60 years      Sex: Female      Is Non- : No      Diabetic: No      Tobacco smoker: No      Systolic Blood Pressure: 128 mmHg      Is BP treated: No      HDL Cholesterol: 53 mg/dL      Total Cholesterol: 239 mg/dL    Hyperlipidemia Medications              omega 3-dha-epa-fish oil (FISH OIL) 1,000 mg (120 mg-180 mg) Cap Take as directed.    rosuvastatin (CRESTOR) 40 MG Tab TAKE ONE TABLET (40MG TOTAL) BY MOUTH EVERY EVENING     VITAMIN D DEFICIENCY:   Chronic. Stable. Continue vitamin D  supplement. Monitor labs.   Lab Results   Component Value Date    SOHVBDGE30MO 66 03/19/2021    TPGVCWBD68SK 47.9 07/26/2019    NOTXIGVD04YC 49 12/09/2016     Do you take any herbs or supplements that were not prescribed by a doctor? no   Are you taking calcium supplements? no      History: OBGYN: Dr. Jung - She is on oral estrogen    LMP: No LMP recorded. Patient has had a hysterectomy.     MMG:   breast cancer in 3 relatives (maternal aunt, mother (age of onset: 85), paternal aunt).   Narrative & Impression  Result:  Mammo Digital Screening Bilat w/ Mykel     History:  Patient is 59 y.o. and is seen for a screening mammogram.     Films Compared:  Compared to: 05/26/2023 Mammo Digital Screening Bilat w/ Mykel and 02/24/2022 Mammo Digital Screening Bilat w/ Mykel      Findings:  This procedure was performed using tomosynthesis.   Computer-aided detection was utilized in the interpretation of this examination.     The breasts are heterogeneously dense, which may obscure small masses. There is no evidence of suspicious masses, microcalcifications or architectural distortion.     Impression:   No mammographic evidence of malignancy.     BI-RADS Category 1: Negative     Recommendation:  Routine screening mammogram in 1 year is recommended.        Exam Ended: 06/27/24 13:48 CDT     PAP: 7/2/2024    Colon cancer screening:   Completed 2/2/2024- 10 year surveillance recommended     Health Maintenance Topics with due status: Not Due       Topic Last Completion Date    TETANUS VACCINE 09/03/2022    Colorectal Cancer Screening 02/02/2024    Lipid Panel 04/25/2024      ==============================================  History reviewed.   Health Maintenance Due   Topic Date Due    Pneumococcal Vaccines (Age 50+) (1 of 2 - PCV) Never done    Shingles Vaccine (1 of 2) Never done    RSV Vaccine (Age 60+ and Pregnant patients) (1 - Risk 60-74 years 1-dose series) Never done    Influenza Vaccine (1) 09/01/2024    Mammogram   2025   Discussed     Past Medical History:  Past Medical History:   Diagnosis Date    Acne     Acquired hypothyroidism 2020    Chronic.  Stable. Lab Results Component Value Date  TSH 1.993 2020  FREET4 1.08 2019  Patient reports compliance with [place] Thyroid 30 mg.    Diverticulosis     Encounter for blood transfusion     Hyperlipidemia     Hypertension     IBS (irritable bowel syndrome)      Past Surgical History:   Procedure Laterality Date     SECTION      X3    CYSTOSCOPY W/ RETROGRADES N/A 10/02/2019    Procedure: CYSTOSCOPY, WITH RETROGRADE PYELOGRAM;  Surgeon: Fred Aranda MD;  [facility];  Service: Urology;  Laterality: N/A;    EYE SURGERY      TOTAL ABDOMINAL HYSTERECTOMY W/ BILATERAL SALPINGOOPHORECTOMY      MENORRHAGIA    TUBAL LIGATION  1994     Review of patient's allergies indicates:  No Known Allergies  Medications Ordered Prior to Encounter[1]  Social History[2]  Family History   Problem Relation Name Age of Onset    Breast cancer Mother Jazmin Lasseigne 85    Hyperlipidemia Mother Jazmin Lasseigne     Hypertension Mother Jazmin Lasseigne     Diabetes Mother Jazmin Lasseigne     Cancer Mother Jazmin Lasseigne         Breast    Diverticulitis Mother Jazmin Lasseigne     Alzheimer's disease Mother Jazmin Lasseigne     Depression Mother Jazmin Lasseigne     Hearing loss Mother Jazmin Lasseigne     Heart disease Mother Jazmin Lasseigne     Hypertension Father Reymundo Lasseigne     Hyperlipidemia Father Reymundo Lasseigne     Stroke Father Reymundo Lasseigne     Hearing loss Father Reymundo Lasseigne     Heart disease Father Reymundo Lasseigne     Cancer Father Reymundo Lasseigne         Prostate    Diverticulitis Sister      Cancer Brother Reymundo Lasseigne         Nasal pharyngeal    Cancer Brother Marilyn Lasseigne         Bladder    Cancer Brother Jos Lasseigne and Marilyn Lasseigne         Nasopharyngeal - bladder    Breast cancer Maternal Aunt       Breast cancer Paternal Aunt       Review of Systems   Constitutional:  Negative for activity change, appetite change, chills, fatigue, fever and unexpected weight change.   HENT:  Negative for congestion, hearing loss, rhinorrhea, sore throat and trouble swallowing.    Eyes:  Negative for discharge and visual disturbance.   Respiratory:  Negative for cough, chest tightness, shortness of breath and wheezing.    Cardiovascular:  Negative for chest pain, palpitations and leg swelling.   Gastrointestinal:  Negative for abdominal pain, blood in stool, constipation, diarrhea and vomiting.   Endocrine: Negative for polydipsia and polyuria.   Genitourinary:  Negative for difficulty urinating, dysuria, hematuria and menstrual problem.   Musculoskeletal:  Negative for arthralgias, joint swelling, myalgias and neck pain.   Skin:  Negative for rash and wound.   Neurological:  Negative for dizziness, weakness and headaches.   Psychiatric/Behavioral:  Negative for behavioral problems, confusion and dysphoric mood. The patient is not nervous/anxious.       Objective:    Nursing note and vitals reviewed.  Vitals:    04/28/25 0845   BP: 128/82   Pulse: (!) 49   Resp: 14     Body mass index is 24.69 kg/m².   Physical Exam  Vitals reviewed.   Constitutional:       General: She is not in acute distress.     Appearance: Normal appearance. She is not ill-appearing.   HENT:      Head: Normocephalic and atraumatic.      Right Ear: External ear normal.      Left Ear: External ear normal.      Nose: Nose normal.   Eyes:      Extraocular Movements: Extraocular movements intact.      Conjunctiva/sclera: Conjunctivae normal.   Cardiovascular:      Rate and Rhythm: Normal rate.      Heart sounds: Normal heart sounds.   Pulmonary:      Effort: Pulmonary effort is normal. No respiratory distress.      Breath sounds: Normal breath sounds.   Abdominal:      General: Abdomen is flat. There is no distension.   Musculoskeletal:         General: Normal  range of motion.      Cervical back: Normal range of motion.      Right lower leg: No edema.      Left lower leg: No edema.   Skin:     General: Skin is warm and dry.      Capillary Refill: Capillary refill takes less than 2 seconds.      Coloration: Skin is not pale.      Findings: No rash.   Neurological:      General: No focal deficit present.      Mental Status: She is alert and oriented to person, place, and time. Mental status is at baseline.      Motor: No weakness.      Gait: Gait normal.   Psychiatric:         Attention and Perception: She is attentive.         Mood and Affect: Mood and affect normal. Mood is not anxious, depressed or elated. Affect is not labile, blunt, flat, angry or tearful.         Speech: Speech normal. Speech is not rapid and pressured, delayed or slurred.         Behavior: Behavior normal. Behavior is not agitated, slowed, aggressive, withdrawn, hyperactive or combative. Behavior is cooperative.         Thought Content: Thought content normal.         Judgment: Judgment normal.       Office Visit on 06/27/2024   Component Date Value Ref Range Status    DIAGNOSIS: 06/27/2024 Comment   Final    NEGATIVE FOR INTRAEPITHELIAL LESION OR MALIGNANCY.    Specimen adequacy: 06/27/2024 Comment   Final    Comment: Satisfactory for evaluation.  Endocervical and/or squamous metaplastic  cells (endocervical component) are present.      Clinician Provided ICD10 06/27/2024 Comment   Final    Comment: Z01.419  Z12.4      Performed by: 06/27/2024 Comment   Final    Harmony aBrr, Cytotechnologist ([facility])    . 06/27/2024 .   Final    Note: 06/27/2024 Comment   Final    Comment: The Pap smear is a screening test designed to aid in the detection of  premalignant and malignant conditions of the uterine cervix.  It is not a  diagnostic procedure and should not be used as the sole means of detecting  cervical cancer.  Both false-positive and false-negative reports do occur.      Test Methodology: 06/27/2024  Comment   Final    Comment: This liquid based ThinPrep(R) pap test was screened with the  use of an image guided system.      . 06/27/2024 Comment   Final    Comment: The HPV DNA reflex criteria were not met with this specimen result  therefore, no HPV testing was performed.        Assessment / Plan:      1.  ANNUAL WELLNESS EXAM -patient here for annual wellness exam.  Labs ordered.  Health maintenance was reviewed and ordered.  Medications were reviewed and reconciled.   Anticipatory guidance: Don't smoke.  Healthy diet and regular exercise recommended. Vaccine recommendations discussed.  See orders.  Reviewed Anticipatory guidance, risk factor reduction interventions and counseling, Complete history , physical was completed today.  Complete and thorough medication reconciliation was performed.  Discussed risks and benefits of medications.  Advised patient on orders and health maintenance.  We discussed old records and old labs if available.  Will request any records not available through epic.  Continue current medications listed on your summary sheet.    All questions were answered. Patient had no further concerns. Advised of Wellness plan. Follow up in 1 year for ANNUAL WELLNESS EXAM    HTN: Continue current blood pressure medication regimen.Counseled on importance of hypertension disease course, I recommend ongoing Education for DASH-diet and exercise. Counseled on medication regimen importance of treating high blood pressure. Please be advised of risk of untreated blood pressure as discussed. Please advised of ER precautions were given for symptoms of hypertensive urgency and emergency.     HLD: update labs. Consider Zetia or statin if remains above goal. Counseled on hyperlipidemia disease course, healthy diet and increased need for exercise. Please be advised of the risk of cardiovascular disease, increase stroke and heart attack risk with uncontrolled/untreated hyperlipidemia. Patient voiced understanding  and understood the treatment plan. All questions were answered.     ADRIANO: continue CPAP. Continue following with pulmonology.    IBS: continue bentyl PRN. Continue following with GI. Colonoscopy UTD.    Hormone replacement therapy: Discussed risks. Continue treatment per GYN.    Vitamin D Deficiency: check level. Continue supplement.     Update routine labs. Continue following with specialists.   Orders Placed This Encounter   Procedures    TSH     Standing Status:   Future     Number of Occurrences:   1     Expected Date:   4/28/2025     Expiration Date:   7/27/2026    Lipid Panel     Standing Status:   Future     Number of Occurrences:   1     Expected Date:   4/28/2025     Expiration Date:   7/27/2026    Hemoglobin A1C     Standing Status:   Future     Number of Occurrences:   1     Expected Date:   4/28/2025     Expiration Date:   6/27/2026    Comprehensive Metabolic Panel     Standing Status:   Future     Number of Occurrences:   1     Expected Date:   4/28/2025     Expiration Date:   6/27/2026     Send normal result to authorizing provider's In Basket if patient is active on MyChart::   Yes    CBC Without Differential     Standing Status:   Future     Number of Occurrences:   1     Expected Date:   4/28/2025     Expiration Date:   7/27/2026     Send normal result to authorizing provider's In Basket if patient is active on MyChart::   Yes         Rosa Teague NP    This note was generated with the assistance of ambient listening technology. Verbal consent was obtained by the patient and accompanying visitor(s) for the recording of patient appointment to facilitate this note. I attest to having reviewed and edited the generated note for accuracy, though some syntax or spelling errors may persist. Please contact the author of this note for any clarification.       [1]   Current Outpatient Medications on File Prior to Visit   Medication Sig Dispense Refill    aspirin (ECOTRIN) 81 MG EC tablet Take 1 tablet (81  mg total) by mouth once daily. 90 tablet 3    cholecalciferol, vitamin D3, (VITAMIN D3) 50 mcg (2,000 unit) Cap capsule Take 2,000 Units by mouth.      dicyclomine (BENTYL) 10 MG capsule daily as needed.       estrogens,esterified,-methyltestosterone 0.625-1.25mg (ESTRATEST HS) per tablet Take 1 tablet by mouth once daily. 30 tablet 5    famotidine (PEPCID) 40 MG tablet Take 40 mg by mouth 2 (two) times daily.      folic acid (FOLVITE) 1 MG tablet 0      levocetirizine (XYZAL) 5 MG tablet Take 5 mg by mouth every evening.      multivit,iron,minerals/lutein (CENTRUM SILVER ULTRA WOMEN'S ORAL)       omega 3-dha-epa-fish oil (FISH OIL) 1,000 mg (120 mg-180 mg) Cap Take as directed.      ONEXTON 1.2 %(1 % base) -3.75 % GlwP Use as directed. 1 Bottle 1    rosuvastatin (CRESTOR) 40 MG Tab TAKE ONE TABLET (40MG TOTAL) BY MOUTH EVERY EVENING 90 tablet 1    turmeric root extract 1,053 mg Tab       vitamin K2, MK-4, 100 mcg Tab       [DISCONTINUED] fluticasone propionate (FLONASE) 50 mcg/actuation nasal spray 1 spray (50 mcg total) by Each Nostril route once daily. 16 g 0     No current facility-administered medications on file prior to visit.   [2]   Social History  Socioeconomic History    Marital status:     Number of children: 3   Tobacco Use    Smoking status: Never    Smokeless tobacco: Never   Substance and Sexual Activity    Alcohol use: Yes     Alcohol/week: 4.0 standard drinks of alcohol     Types: 4 Glasses of wine per week     Comment: daily    Drug use: Never    Sexual activity: Yes     Partners: Male     Birth control/protection: Post-menopausal     Social Drivers of Health     Financial Resource Strain: Low Risk  (4/22/2024)    Overall Financial Resource Strain (CARDIA)     Difficulty of Paying Living Expenses: Not hard at all   Food Insecurity: No Food Insecurity (4/22/2024)    Hunger Vital Sign     Worried About Running Out of Food in the Last Year: Never true     Ran Out of Food in the Last Year:  Never true   Transportation Needs: No Transportation Needs (4/22/2024)    PRAPARE - Transportation     Lack of Transportation (Medical): No     Lack of Transportation (Non-Medical): No   Physical Activity: Sufficiently Active (4/22/2024)    Exercise Vital Sign     Days of Exercise per Week: 4 days     Minutes of Exercise per Session: 60 min   Stress: No Stress Concern Present (4/22/2024)    [facility] - Occupational Stress Questionnaire     Feeling of Stress : Not at all   Housing Stability: Low Risk  (7/7/2023)    Housing Stability Vital Sign     Unable to Pay for Housing in the Last Year: No     Number of Places Lived in the Last Year: 1     Unstable Housing in the Last Year: No

## 2025-04-29 ENCOUNTER — RESULTS FOLLOW-UP (OUTPATIENT)
Dept: FAMILY MEDICINE | Facility: CLINIC | Age: 60
End: 2025-04-29

## 2025-05-19 DIAGNOSIS — E78.00 PURE HYPERCHOLESTEROLEMIA: ICD-10-CM

## 2025-05-20 ENCOUNTER — PATIENT MESSAGE (OUTPATIENT)
Dept: FAMILY MEDICINE | Facility: CLINIC | Age: 60
End: 2025-05-20
Payer: COMMERCIAL

## 2025-05-20 RX ORDER — ROSUVASTATIN CALCIUM 40 MG/1
40 TABLET, COATED ORAL DAILY
Qty: 90 TABLET | Refills: 1 | Status: SHIPPED | OUTPATIENT
Start: 2025-05-20

## 2025-05-20 NOTE — TELEPHONE ENCOUNTER
Refill Routing Note   Medication(s) are not appropriate for processing by [facility] for the following reason(s):        Patient not seen by provider within 15 months  Responsible provider unclear    [facility] action(s):  Route      Medication Therapy Plan: pt needs appt      Appointments  past 12m or future 3m with PCP    Date Provider   Last Visit   4/28/2025 Rosa Teague NP   Next Visit   Visit date not found Rosa Teague NP   ED visits in past 90 days: 0        Note composed:11:09 PM 05/19/2025

## 2025-06-11 ENCOUNTER — E-VISIT (OUTPATIENT)
Dept: FAMILY MEDICINE | Facility: CLINIC | Age: 60
End: 2025-06-11
Payer: COMMERCIAL

## 2025-06-11 DIAGNOSIS — J01.90 ACUTE BACTERIAL SINUSITIS: Primary | ICD-10-CM

## 2025-06-11 DIAGNOSIS — B96.89 ACUTE BACTERIAL SINUSITIS: Primary | ICD-10-CM

## 2025-06-11 RX ORDER — AZITHROMYCIN 250 MG/1
TABLET, FILM COATED ORAL
Qty: 6 TABLET | Refills: 0 | Status: SHIPPED | OUTPATIENT
Start: 2025-06-11 | End: 2025-06-16

## 2025-06-11 RX ORDER — PREDNISONE 20 MG/1
20 TABLET ORAL DAILY
Qty: 5 TABLET | Refills: 0 | Status: SHIPPED | OUTPATIENT
Start: 2025-06-11 | End: 2025-06-16

## 2025-06-11 NOTE — PROGRESS NOTES
Patient ID: Viridiana Ross is a 60 y.o. female.    Chief Complaint: General Illness (Entered automatically based on patient selection in [employer].)    The patient initiated a request through [employer] on 6/11/2025 for evaluation and management with a chief complaint of General Illness (Entered automatically based on patient selection in [employer].)     I evaluated the questionnaire submission on 6/11/25.    [facility]    6/11/2025  6:13 AM CDT - Filed by Patient   What do you need help with? Cough, Cold, Sore Throat   Do you agree to participate in an E-Visit? Yes   If you have any of the following symptoms, go to your local emergency room or call 911: I acknowledge   What is the main issue you would like addressed today? Cough   Do you think you might have COVID-19 or the Flu? No   What symptoms do you currently have?  Cough;  Fatigue;  Runny nose   Describe your cough: Contains mucus;  Dry;  Does not stop;  Interferes with daily activities   Describe your mucus: Clear   Have you ever smoked? Never smoked   Do you have a fever? No   When did your concern begin? 6/4/2025   In the last two weeks, have you been in close contact with someone who has COVID-19, the Flu, or strep throat? No   What have you tried to help your symptoms? Cold medication;  Drinking more fluids;  Rest and sleep;  Other   List what you have done or taken to help your symptoms. Cough drops   On a scale of 1-10, where 10 is the worst you can imagine, how severe are your symptoms? (range: 1 - 10) 9   How have your symptoms changed since they first started? No change   Do you have transportation to an [facility] location to get tested for COVID-19? Yes   Provide any additional information you feel is important. I took a Covid-19 home test. It was negative.   Please attach any relevant images or files    Are you able to take your vital signs? Yes   Systolic Blood Pressure: 106   Diastolic Blood Pressure: 83   Weight: 152    Height: 66   Pulse: 67   Temperature: 98.6   Respiration rate:    Pulse Oxygen: 97          Active Problem List with Overview Notes    Diagnosis Date Noted    ADRIANO (obstructive sleep apnea) 06/30/2023     PHYSICIAN INTERPRETATION AND COMMENTS: Findings are consistent with severe, positional obstructive sleep  apnea(ADRIANO). Therapy indicated. Please refer to sleep disorders clinic  CLINICAL HISTORY: 58 year old female presented with: 12.25 inch neck, BMI of 23.7, an Slaton sleepiness score of 19, no  co-morbidities and symptoms of nocturnal snoring, waking up choking and witnessed apneas. Based on the clinical  history, the patient has a high pre-test probability of having Mild ADRIANO.  SLEEP STUDY FINDINGS: Patient underwent a 1 night Home Sleep Test and by behavioral criteria, slept for approximately  6.32 hours , with a sleep efficiency of 90%. Severe sleep disordered breathing (AHI=31) is noted based on a 4% hypopnea  desaturation criteria. The patient slept supine 66.4% of the night based on valid recording time of 6.31 hours and is 1.7  times as likely to have apneas/hypopneas when supine. When considering more subtle measures of sleep disordered  breathing, the overall respiratory disturbance index is severe(RDI=48) based on a 1% hypopnea desaturation criteria with  confirmation by surrogate arousal indicators, predominantly in the supine position (56 events/hour). The  apneas/hypopneas are accompanied by minimal oxygen desaturation (percent time below 90% SpO2: 3%, Min SpO2:  80.4%). The average desaturation across all sleep disordered breathing events is 3.6%. Snoring occurs for 3.9% (30 dB) of  the study. The mean pulse rate is 56.9 BPM, with very frequent pulse rate variability (86 events with >= 6 BPM  increase/decrease per hour).  TREATMENT CONSIDERATIONS: Consider nasal continuous positive airway pressure (CPAP/AutoPAP) as the initial  treatment choice for Severe obstructive sleep apnea. A mandibular  advancement splint (MAS) or referral to an ENT  surgeon for modification to the airway should be considered to reduce the risk of mortality caused by Severe ADRIANO if the  patient prefers an alternative therapy or the CPAP trial is unsuccessful. Based on the ADRIANO Supine data in the study, a  Mandibular Advancement Splint (MAS) will likely provide treatment benefit independent of ADRIANO severity. The patient  should avoid sleeping supine; the non-supine RDI is 1.7 times less severe than the supine RDI.  DISEASE MANAGEMENT CONSIDERATIONS: Insomnia and morning headaches are symptoms that can be associated with  untreated ADRIANO.      Acquired hypothyroidism 06/18/2020     Chronic.  Stable.  Lab Results   Component Value Date    TSH 1.924 12/15/2020    FREET4 0.95 12/15/2020     Patient reports compliance with [place] Thyroid 30 mg.      Lung nodule 11/25/2019     Impression       1. The coronary artery calcium screening score is 47.  This implies definite, at least mild, atherosclerotic plaque deposition within the coronary arteries.  Mild or minimal coronary artery narrowings are likely.  2. 3 mm left lower lobe pulmonary nodule.  For a solid nodule <6 mm, Fleischner Society 2017 guidelines recommend no routine follow up for a low risk patient, or follow-up with non-contrast chest CT at 12 months in a high risk patient.  This report was flagged in Epic as abnormal.      Electronically signed by: Nestor Gale MD  Date: 10/07/2019  Time: 16:45         Agatston CAC score, <100 11/25/2019    Nonrheumatic tricuspid valve regurgitation 11/25/2019    FH: heart disease 09/10/2019     Reports father had stents and heart attacks.      Encounter for long-term (current) use of medications 06/27/2019 July 2023:  Reviewed labs.   06/27/2019 CHRONIC long-term drug therapy for managed conditions. See medication list. Reports compliance.  No side effects reported.  Routine lab work is being monitored.  Patient does not need refills today.    09/10/2019Update.  Patient here with concerns about her recent lipid panel.  Patient reports compliance with hydrochlorothiazide for hypertension and lower extremity edema.  Blood pressure is low normal today.Patient compliant with other medications as well.  Patient not currently taking anything for cholesterol.Patient does do KETO diet which may have made her cholesterol worse.  November 2019:  CHRONIC. Stable. Compliant with medications for managed conditions. See medication list. No SE reported. Routine lab analysis is being monitored. Refills were addressed.  June 2020:  Patient reports diarrhea to Zetia.  Patient stop medication and cholesterol has increased.  See problem.  CHRONIC. Stable. Compliant with medications for managed conditions. See medication list. Routine lab analysis is being monitored. Refills were addressed.  December 2020:  Patient reports compliance with pravastatin.  See problem.  She has stop several supplements since previous visit.  See med list.  Lab Results   Component Value Date    WBC 8.94 05/18/2023    HGB 14.8 05/18/2023    HCT 46.6 05/18/2023    MCV 96 05/18/2023     05/18/2023         Chemistry        Component Value Date/Time     05/18/2023 1112    K 4.2 05/18/2023 1112     05/18/2023 1112    CO2 27 05/18/2023 1112    BUN 18 05/18/2023 1112    CREATININE 0.9 05/18/2023 1112    GLU 79 05/18/2023 1112        Component Value Date/Time    CALCIUM 10.1 05/18/2023 1112    ALKPHOS 85 05/18/2023 1112    AST 22 05/18/2023 1112    ALT 18 05/18/2023 1112    BILITOT 0.7 05/18/2023 1112    ESTGFRAFRICA >60.0 03/18/2022 0808    EGFRNONAA >60.0 03/18/2022 0808          Lab Results   Component Value Date    TSH 1.414 05/18/2023    FREET4 0.95 12/15/2020    T3FREE 3.2 12/15/2020           Irritable bowel syndrome with diarrhea 06/27/2019     Chronic.  Fairly well controlled.  Patient recently started on KETO diet and her symptoms have improved.  Patient would like  -inflammatories numbers measured.      Vitamin D deficiency 06/27/2019 06/27/2019Vit d deficiency. Takes vitamin d supplement. No SE reported. Fatigue is slightly improved.   09/10/2019Patient reports taking vitamin-D.  Fatigue is improved.  No side effects reported.  Vitamin-D level is adequate.  Chronic. Vit d deficiency. Takes vitamin d supplement. No SE reported. Fatigue is slightly improved.   Lab Results   Component Value Date    GAFTVPTG05GA 47.9 07/26/2019    IFKVUQED31YS 49 12/09/2016            Family history of stroke 06/27/2019     A strong family history.  Patient would like to know more about her cholesterol prior to starting statin.      Pure hypercholesterolemia 06/06/2017     Lab Results   Component Value Date    CHOL 291 (H) 12/15/2020    CHOL 261 (H) 08/21/2020    CHOL 336 (H) 05/29/2020     Lab Results   Component Value Date    HDL 89 (H) 12/15/2020    HDL 77 (H) 08/21/2020    HDL 94 (H) 05/29/2020     Lab Results   Component Value Date    LDLCALC 190.2 (H) 12/15/2020    LDLCALC 169.6 (H) 08/21/2020    LDLCALC 231.6 (H) 05/29/2020     Lab Results   Component Value Date    TRIG 59 12/15/2020    TRIG 72 08/21/2020    TRIG 52 05/29/2020     Lab Results   Component Value Date    CHOLHDL 30.6 12/15/2020    CHOLHDL 29.5 08/21/2020    CHOLHDL 28.0 05/29/2020     Initial HPI:  Chronic.  Untreated.  Only using diet and exercise.The most recent lipid panel was done by her OBGYN on May 31, 2019.  Showing total cholesterol is still elevated 286.  Triglycerides are normal at 82 HDL is therapeutic at 89 LDL is still high at 181.  Patient has strong family history of heart attacks in her family.  Thyroid studies were normal.  Testosterone is within normal limits.  Blood count and metabolic panel within normal limits.  ==========================  Patient does not want to take a statin at this time.  But is willing to take Zetia and fish oil and niacin.  Patient is concerned about her strong family history of  heart attacks and strokes.  Patient denies any symptoms of chest pain shortness of breath blurry vision dizziness lightheadedness abdominal pain etc.  ======================================  JUNE 2020:  Lipid panel is significantly elevated from previous.  Patient has been on statins in the past and had severe muscle aches.  ==========================================  October 2020:  Patient did retry statin however she noticed that she was having headaches since she started this medication.  Patient tried to stop medication and the headaches went away.  ==================================================  DECEMBER 2020:  Lipid panel has worsened over previous three months.  Patient reports compliance with pravastatin 40 mg daily.  She was recently changed to this medication.  No side effects reported.  Patient did stop multiple supplements and estrogen patch since last visit.  Patient no longer taking fish oil.  =================================================      Diverticulosis of large intestine without hemorrhage 12/05/2016    Essential hypertension 12/05/2016     Initial HPI:  Chronic.  Stable.  Well controlled.  Has been present for many years.  Taking hydrochlorothiazide.  Reports compliance.  No side effects reported.  Denies any chest pain shortness of breath blurred vision.  Patient does have a strong family history of heart attacks and strokes in the family.    09/10/2019  Patient is compliant with hydrochlorothiazide 25 mg.  Patient has been participating in the digital medicine hypertension program.  Blood pressure is low normal on most occasions.  Patient does have lower extremity edema occasionally which is resolved with the hydrochlorothiazide.  Denies any side effects.  Denies any chest pain shortness of breath blurred vision.  Dizziness or lightheadedness.      Hormone replacement therapy (postmenopausal) 06/11/2013     On oral estrogen. Follows with GYN Preethi Jung.         Recent Labs  Obtained:  No visits with results within 7 Day(s) from this visit.   Latest known visit with results is:   Lab Visit on 04/28/2025   Component Date Value Ref Range Status    TSH 04/28/2025 2.618  0.400 - 4.000 uIU/mL Final    Cholesterol Total 04/28/2025 169  120 - 199 mg/dL Final    The National Cholesterol Education Program (NCEP) has set the  following guidelines (reference ranges) for Cholesterol:  Optimal.....................<200 mg/dL  Borderline High.............200-239 mg/dL  High........................> or = 240 mg/dL    Triglyceride 04/28/2025 88  30 - 150 mg/dL Final    The National Cholesterol Education Program (NCEP) has set the  following guidelines (reference values) for triglycerides:  Normal......................<150 mg/dL  Borderline High.............150-199 mg/dL  High........................200-499 mg/dL    HDL Cholesterol 04/28/2025 50  40 - 75 mg/dL Final    The National Cholesterol Education Program (NCEP) has set the   following guidelines (reference values) for HDL Cholesterol:   Low...............<40 mg/dL   Optimal...........>60 mg/dL    LDL Cholesterol 04/28/2025 101.4  63.0 - 159.0 mg/dL Final    The National Cholesterol Education Program (NCEP) has set the  following guidelines (reference values) for LDL Cholesterol:  Optimal.......................<130 mg/dL  Borderline High...............130-159 mg/dL  High..........................160-189 mg/dL  Very High.....................>190 mg/dL  LDL calculated using the Friedewald equation.    HDL/Cholesterol Ratio 04/28/2025 29.6  20.0 - 50.0 % Final    Cholesterol/HDL Ratio 04/28/2025 3.4  2.0 - 5.0 Final    Non HDL Cholesterol 04/28/2025 119  mg/dL Final    Risk category and Non-HDL cholesterol goals:  Coronary heart disease (CHD)or equivalent (10-year risk of CHD >20%):  Non-HDL cholesterol goal     <130 mg/dL  Two or more CHD risk factors and 10-year risk of CHD <= 20%:  Non-HDL cholesterol goal     <160 mg/dL  0 to 1 CHD risk  factor:  Non-HDL cholesterol goal     <190 mg/dL    Hemoglobin A1c 04/28/2025 5.4  4.0 - 5.6 % Final    ADA Screening Guidelines:  5.7-6.4%  Consistent with prediabetes  >=6.5%  Consistent with diabetes    High levels of fetal hemoglobin interfere with the HbA1C  assay. Heterozygous hemoglobin variants (HbS, HgC, etc)do  not significantly interfere with this assay.   However, presence of multiple variants may affect accuracy.    Estimated Average Glucose 04/28/2025 108  68 - 131 mg/dL Final    Sodium 04/28/2025 140  136 - 145 mmol/L Final    Potassium 04/28/2025 4.1  3.5 - 5.1 mmol/L Final    Chloride 04/28/2025 108  95 - 110 mmol/L Final    CO2 04/28/2025 25  23 - 29 mmol/L Final    Glucose 04/28/2025 86  70 - 110 mg/dL Final    BUN 04/28/2025 15  8 - 23 mg/dL Final    Creatinine 04/28/2025 0.9  0.5 - 1.4 mg/dL Final    Calcium 04/28/2025 9.2  8.7 - 10.5 mg/dL Final    Protein Total 04/28/2025 6.9  6.0 - 8.4 gm/dL Final    Albumin 04/28/2025 3.7  3.5 - 5.2 g/dL Final    Bilirubin Total 04/28/2025 0.4  0.1 - 1.0 mg/dL Final    For infants and newborns, interpretation of results should be based   on gestational age, weight and in agreement with clinical   observations.    Premature Infant recommended reference ranges:   0-24 hours:  <8.0 mg/dL   24-48 hours: <12.0 mg/dL   3-5 days:    <15.0 mg/dL   6-29 days:   <15.0 mg/dL    ALP 04/28/2025 84  40 - 150 unit/L Final    AST 04/28/2025 28  11 - 45 unit/L Final    ALT 04/28/2025 33  10 - 44 unit/L Final    Anion Gap 04/28/2025 7 (L)  8 - 16 mmol/L Final    UNABLE TO CALCULATE    eGFR 04/28/2025 >60  >60 mL/min/1.73/m2 Final    Estimated GFR calculated using the CKD-EPI creatinine (2021) equation.    WBC 04/28/2025 7.21  3.90 - 12.70 K/uL Final    RBC 04/28/2025 4.75  4.00 - 5.40 M/uL Final    HGB 04/28/2025 14.4  12.0 - 16.0 gm/dL Final    HCT 04/28/2025 44.8  37.0 - 48.5 % Final    MCV 04/28/2025 94  82 - 98 fL Final    MCHC 04/28/2025 32.1  32.0 - 36.0 g/dL Final     RDW 2025 13.2  11.5 - 14.5 % Final    Platelet Count 2025 278  150 - 450 K/uL Final    MCH 2025 30.3  27.0 - 31.0 pg Final    MPV 2025 10.8  9.2 - 12.9 fL Final       Encounter Diagnosis   Name Primary?    Acute bacterial sinusitis Yes        No orders of the defined types were placed in this encounter.     Medications Ordered This Encounter   Medications    azithromycin (Z-SAMANTA) 250 MG tablet     Sig: Take 2 tablets by mouth on day 1; Take 1 tablet by mouth on days 2-5     Dispense:  6 tablet     Refill:  0    predniSONE (DELTASONE) 20 MG tablet     Sig: Take 1 tablet (20 mg total) by mouth once daily. for 5 days     Dispense:  5 tablet     Refill:  0        E-Visit Time Trackin minutes

## (undated) DEVICE — SET IRR URLGY 2LINE UNIV SPIKE

## (undated) DEVICE — COVER LIGHT HANDLE 80/CA

## (undated) DEVICE — SEE MEDLINE ITEM 152622

## (undated) DEVICE — SEE MEDLINE ITEM 157128

## (undated) DEVICE — GLOVE 7.5 PROTEXIS PI BLUE

## (undated) DEVICE — BAG URO DRAIN

## (undated) DEVICE — GUIDEWIRE UROLOGY .038X150CM

## (undated) DEVICE — NEPTUNE 4 PORT MANIFOLD

## (undated) DEVICE — SYR 10CC LUER LOCK

## (undated) DEVICE — DRAPE C ARM 42 X 120 10/BX

## (undated) DEVICE — SEE MEDLINE ITEM 154981

## (undated) DEVICE — SPONGE GAUZE 16PLY 4X4

## (undated) DEVICE — SEE MEDLINE ITEM 152487

## (undated) DEVICE — GOWN SURGICAL X-LARGE

## (undated) DEVICE — PACK CYSTO

## (undated) DEVICE — WIRE GD LUB STD 3CM .035 150CM

## (undated) DEVICE — SOL IRR WATER STRL 3000 ML

## (undated) DEVICE — CONTAINER SPECIMEN STRL 4OZ